# Patient Record
Sex: FEMALE | Race: WHITE | NOT HISPANIC OR LATINO
[De-identification: names, ages, dates, MRNs, and addresses within clinical notes are randomized per-mention and may not be internally consistent; named-entity substitution may affect disease eponyms.]

---

## 2018-08-28 ENCOUNTER — APPOINTMENT (OUTPATIENT)
Dept: CARDIOLOGY | Facility: CLINIC | Age: 63
End: 2018-08-28
Payer: COMMERCIAL

## 2018-08-28 VITALS
HEART RATE: 104 BPM | SYSTOLIC BLOOD PRESSURE: 144 MMHG | TEMPERATURE: 98.1 F | BODY MASS INDEX: 17.5 KG/M2 | DIASTOLIC BLOOD PRESSURE: 88 MMHG | OXYGEN SATURATION: 96 % | WEIGHT: 100 LBS | HEIGHT: 63.5 IN

## 2018-08-28 DIAGNOSIS — Z82.49 FAMILY HISTORY OF ISCHEMIC HEART DISEASE AND OTHER DISEASES OF THE CIRCULATORY SYSTEM: ICD-10-CM

## 2018-08-28 PROCEDURE — 93000 ELECTROCARDIOGRAM COMPLETE: CPT

## 2018-08-28 PROCEDURE — 99244 OFF/OP CNSLTJ NEW/EST MOD 40: CPT

## 2018-09-01 ENCOUNTER — TRANSCRIPTION ENCOUNTER (OUTPATIENT)
Age: 63
End: 2018-09-01

## 2018-10-01 ENCOUNTER — APPOINTMENT (OUTPATIENT)
Dept: VASCULAR SURGERY | Facility: CLINIC | Age: 63
End: 2018-10-01
Payer: COMMERCIAL

## 2018-10-01 VITALS
HEIGHT: 63 IN | OXYGEN SATURATION: 100 % | DIASTOLIC BLOOD PRESSURE: 101 MMHG | TEMPERATURE: 98.5 F | SYSTOLIC BLOOD PRESSURE: 158 MMHG | HEART RATE: 92 BPM

## 2018-10-01 VITALS
BODY MASS INDEX: 17.18 KG/M2 | HEART RATE: 98 BPM | DIASTOLIC BLOOD PRESSURE: 100 MMHG | SYSTOLIC BLOOD PRESSURE: 157 MMHG | WEIGHT: 97 LBS

## 2018-10-01 PROCEDURE — 93880 EXTRACRANIAL BILAT STUDY: CPT

## 2018-10-01 PROCEDURE — 99203 OFFICE O/P NEW LOW 30 MIN: CPT

## 2018-12-27 ENCOUNTER — OUTPATIENT (OUTPATIENT)
Dept: OUTPATIENT SERVICES | Facility: HOSPITAL | Age: 63
LOS: 1 days | End: 2018-12-27
Payer: COMMERCIAL

## 2018-12-27 VITALS
OXYGEN SATURATION: 99 % | WEIGHT: 95.02 LBS | RESPIRATION RATE: 18 BRPM | HEART RATE: 99 BPM | SYSTOLIC BLOOD PRESSURE: 128 MMHG | TEMPERATURE: 98 F | HEIGHT: 63 IN | DIASTOLIC BLOOD PRESSURE: 94 MMHG

## 2018-12-27 DIAGNOSIS — Z90.12 ACQUIRED ABSENCE OF LEFT BREAST AND NIPPLE: Chronic | ICD-10-CM

## 2018-12-27 DIAGNOSIS — Z90.89 ACQUIRED ABSENCE OF OTHER ORGANS: Chronic | ICD-10-CM

## 2018-12-27 DIAGNOSIS — Z90.710 ACQUIRED ABSENCE OF BOTH CERVIX AND UTERUS: Chronic | ICD-10-CM

## 2018-12-27 DIAGNOSIS — Z98.890 OTHER SPECIFIED POSTPROCEDURAL STATES: Chronic | ICD-10-CM

## 2018-12-27 DIAGNOSIS — Z98.41 CATARACT EXTRACTION STATUS, RIGHT EYE: Chronic | ICD-10-CM

## 2018-12-27 DIAGNOSIS — Z01.818 ENCOUNTER FOR OTHER PREPROCEDURAL EXAMINATION: ICD-10-CM

## 2018-12-27 DIAGNOSIS — K86.89 OTHER SPECIFIED DISEASES OF PANCREAS: ICD-10-CM

## 2018-12-27 DIAGNOSIS — Z90.13 ACQUIRED ABSENCE OF BILATERAL BREASTS AND NIPPLES: Chronic | ICD-10-CM

## 2018-12-27 DIAGNOSIS — Z98.42 CATARACT EXTRACTION STATUS, LEFT EYE: Chronic | ICD-10-CM

## 2018-12-27 DIAGNOSIS — Z96.7 PRESENCE OF OTHER BONE AND TENDON IMPLANTS: Chronic | ICD-10-CM

## 2018-12-27 DIAGNOSIS — Z98.1 ARTHRODESIS STATUS: Chronic | ICD-10-CM

## 2018-12-27 PROCEDURE — G0463: CPT

## 2018-12-27 NOTE — H&P PST ADULT - PSH
H/O bilateral breast reduction surgery  2002  History of spinal fusion  cervical C5-C7 with metal hardware-2012 ( discectomy)  S/P carpal tunnel release  left-2015  S/P cataract surgery, left    S/P cataract surgery, right  with artificial lenses- November 2016  S/P decompression of ulnar nerve at elbow  left -2015 ( done with carpal tunnel release)  S/P hysterectomy  2008  S/P lumpectomy, left breast  2010  S/P ORIF (open reduction internal fixation) fracture  left foot  S/P tonsillectomy

## 2018-12-27 NOTE — H&P PST ADULT - HISTORY OF PRESENT ILLNESS
62 yo female with chronic pain and decreased appetite, wt loss of 10 lbs.  MRI of abdomen shows large gall stones blocking bile duct.

## 2018-12-27 NOTE — H&P PST ADULT - PMH
Anxiety    Breast mass, left    Carpal tunnel syndrome of left wrist    Cataract  bilateral eyes  Cervical disc disorder at C6-C7 level with radiculopathy  with ulnar nerve compression  Chronic pain    Fibrocystic breast disease    Foot fracture, left  2016, s/p pins/screws  HPV in female    Osteoporosis    Subclavian arterial stenosis  left side 2018  Uterine fibroid

## 2019-01-03 ENCOUNTER — RESULT REVIEW (OUTPATIENT)
Age: 64
End: 2019-01-03

## 2019-01-03 ENCOUNTER — OUTPATIENT (OUTPATIENT)
Dept: OUTPATIENT SERVICES | Facility: HOSPITAL | Age: 64
LOS: 1 days | End: 2019-01-03
Payer: COMMERCIAL

## 2019-01-03 DIAGNOSIS — Z98.890 OTHER SPECIFIED POSTPROCEDURAL STATES: Chronic | ICD-10-CM

## 2019-01-03 DIAGNOSIS — Z90.13 ACQUIRED ABSENCE OF BILATERAL BREASTS AND NIPPLES: Chronic | ICD-10-CM

## 2019-01-03 DIAGNOSIS — Z98.41 CATARACT EXTRACTION STATUS, RIGHT EYE: Chronic | ICD-10-CM

## 2019-01-03 DIAGNOSIS — Z90.89 ACQUIRED ABSENCE OF OTHER ORGANS: Chronic | ICD-10-CM

## 2019-01-03 DIAGNOSIS — Z01.818 ENCOUNTER FOR OTHER PREPROCEDURAL EXAMINATION: ICD-10-CM

## 2019-01-03 DIAGNOSIS — Z90.12 ACQUIRED ABSENCE OF LEFT BREAST AND NIPPLE: Chronic | ICD-10-CM

## 2019-01-03 DIAGNOSIS — Z98.1 ARTHRODESIS STATUS: Chronic | ICD-10-CM

## 2019-01-03 DIAGNOSIS — K86.89 OTHER SPECIFIED DISEASES OF PANCREAS: ICD-10-CM

## 2019-01-03 DIAGNOSIS — Z98.42 CATARACT EXTRACTION STATUS, LEFT EYE: Chronic | ICD-10-CM

## 2019-01-03 DIAGNOSIS — Z96.7 PRESENCE OF OTHER BONE AND TENDON IMPLANTS: Chronic | ICD-10-CM

## 2019-01-03 DIAGNOSIS — Z90.710 ACQUIRED ABSENCE OF BOTH CERVIX AND UTERUS: Chronic | ICD-10-CM

## 2019-01-03 PROCEDURE — 43239 EGD BIOPSY SINGLE/MULTIPLE: CPT

## 2019-01-03 PROCEDURE — 88305 TISSUE EXAM BY PATHOLOGIST: CPT | Mod: 26

## 2019-01-03 PROCEDURE — 88305 TISSUE EXAM BY PATHOLOGIST: CPT

## 2019-01-03 PROCEDURE — 88312 SPECIAL STAINS GROUP 1: CPT

## 2019-01-03 PROCEDURE — 88312 SPECIAL STAINS GROUP 1: CPT | Mod: 26

## 2019-01-04 LAB — SURGICAL PATHOLOGY STUDY: SIGNIFICANT CHANGE UP

## 2019-04-07 ENCOUNTER — TRANSCRIPTION ENCOUNTER (OUTPATIENT)
Age: 64
End: 2019-04-07

## 2019-04-08 ENCOUNTER — APPOINTMENT (OUTPATIENT)
Dept: VASCULAR SURGERY | Facility: CLINIC | Age: 64
End: 2019-04-08

## 2020-01-31 PROBLEM — S92.902A UNSPECIFIED FRACTURE OF LEFT FOOT, INITIAL ENCOUNTER FOR CLOSED FRACTURE: Chronic | Status: ACTIVE | Noted: 2018-12-27

## 2020-01-31 PROBLEM — G89.29 OTHER CHRONIC PAIN: Chronic | Status: ACTIVE | Noted: 2018-12-27

## 2020-01-31 PROBLEM — I77.1 STRICTURE OF ARTERY: Chronic | Status: ACTIVE | Noted: 2018-12-27

## 2020-01-31 PROBLEM — M81.0 AGE-RELATED OSTEOPOROSIS WITHOUT CURRENT PATHOLOGICAL FRACTURE: Chronic | Status: ACTIVE | Noted: 2018-12-27

## 2020-03-10 ENCOUNTER — APPOINTMENT (OUTPATIENT)
Dept: ENDOCRINOLOGY | Facility: CLINIC | Age: 65
End: 2020-03-10
Payer: COMMERCIAL

## 2020-03-10 VITALS
HEART RATE: 108 BPM | HEIGHT: 63 IN | DIASTOLIC BLOOD PRESSURE: 101 MMHG | WEIGHT: 90 LBS | BODY MASS INDEX: 15.95 KG/M2 | SYSTOLIC BLOOD PRESSURE: 159 MMHG

## 2020-03-10 DIAGNOSIS — M81.0 AGE-RELATED OSTEOPOROSIS W/OUT CURRENT PATHOLOGICAL FRACTURE: ICD-10-CM

## 2020-03-10 PROCEDURE — 99205 OFFICE O/P NEW HI 60 MIN: CPT

## 2020-03-11 LAB
25(OH)D3 SERPL-MCNC: 34.9 NG/ML
ALBUMIN SERPL ELPH-MCNC: 4.9 G/DL
ALP BLD-CCNC: 63 U/L
ALT SERPL-CCNC: 17 U/L
ANION GAP SERPL CALC-SCNC: 17 MMOL/L
AST SERPL-CCNC: 36 U/L
BILIRUB SERPL-MCNC: 0.4 MG/DL
BUN SERPL-MCNC: 10 MG/DL
CA-I SERPL-SCNC: 1.24 MMOL/L
CALCIUM SERPL-MCNC: 9.8 MG/DL
CALCIUM SERPL-MCNC: 9.8 MG/DL
CHLORIDE SERPL-SCNC: 103 MMOL/L
CO2 SERPL-SCNC: 24 MMOL/L
CREAT SERPL-MCNC: 0.56 MG/DL
GLUCOSE SERPL-MCNC: 86 MG/DL
MAGNESIUM SERPL-MCNC: 2.2 MG/DL
PARATHYROID HORMONE INTACT: 39 PG/ML
PHOSPHATE SERPL-MCNC: 2.7 MG/DL
POTASSIUM SERPL-SCNC: 4.3 MMOL/L
PROT SERPL-MCNC: 6.9 G/DL
SODIUM SERPL-SCNC: 144 MMOL/L
T3 SERPL-MCNC: 105 NG/DL
T4 FREE SERPL-MCNC: 1.1 NG/DL
TSH SERPL-ACNC: 0.58 UIU/ML

## 2020-03-12 LAB — TSI ACT/NOR SER: <0.1 IU/L

## 2020-03-13 ENCOUNTER — TRANSCRIPTION ENCOUNTER (OUTPATIENT)
Age: 65
End: 2020-03-13

## 2020-03-15 ENCOUNTER — TRANSCRIPTION ENCOUNTER (OUTPATIENT)
Age: 65
End: 2020-03-15

## 2020-03-18 ENCOUNTER — TRANSCRIPTION ENCOUNTER (OUTPATIENT)
Age: 65
End: 2020-03-18

## 2020-03-18 NOTE — PHYSICAL EXAM
[Normal Hearing] : hearing was normal [No Neck Mass] : no neck mass was observed [Supple] : the neck was supple [Thyroid Not Enlarged] : the thyroid was not enlarged [Normal Rate] : heart rate was normal  [Normal S1, S2] : normal S1 and S2 [Regular Rhythm] : with a regular rhythm [No Edema] : there was no peripheral edema [Normal Bowel Sounds] : normal bowel sounds [Not Tender] : non-tender [Soft] : abdomen soft [Not Distended] : not distended [No Stigmata of Cushings Syndrome] : no stigmata of cushings syndrome [Normal Gait] : normal gait [Normal Strength/Tone] : muscle strength and tone were normal [Normal Reflexes] : deep tendon reflexes were 2+ and symmetric [No Tremors] : no tremors [Normal Affect] : the affect was normal [Normal Mood] : the mood was normal [Acne] : no acne [Abdominal Striae] : no abdominal striae [de-identified] : thin  woman in NAD [de-identified] : mild proptosis. left eye looks down and laterally, causing disconjugate gaze [de-identified] : tranverse scar from prior disc fusion

## 2020-03-18 NOTE — ADDENDUM
[FreeTextEntry1] : 3/10/20: 25OH vit D 34.9, Phos 2.7, Mg 2.2, Cr 0.56, , LFTs wnl, Ca 9.8, PTH 39, TSH 0.58, fT4 1.1, TT3 105 all wnl. TSI normal. \par -called 3/12/20 to discuss. No answer. Will discuss at upcoming visit. \par -3/13/20: pt called me back. I reviewed the above with her. She says she's looked up both Forteo and Tymlos and doesn't want to take either. I urged her to keep the upcoming appt so we can discuss more about her osteoporosis.\par -DXA from 3/12/20 showed that the worst T score was -3.1 at bilateral femoral necks. We asked for more detailed records. 3/18/20: again urged pt to consider Forteo or Tymlos or at least an oral bisphosphonate since she doesn't want to take Reclast.

## 2020-03-18 NOTE — DATA REVIEWED
[FreeTextEntry1] : 1/22/19: Cr 0.55, , Ca 9.6, Transglutaminase IgA Ab neg\par \par 10/17/19: LDL 95, HDL 87, Tchol 220, Tg 264, TSH 0.35L, fT4 1.0, TT4 6.4 wnl, T3 uptake 37 H (ref 22-35%), TT3 111 wnl, MCV 101H, A1c 5.1%, RF 54H, 25 OH Vit D 41

## 2020-03-18 NOTE — ASSESSMENT
[FreeTextEntry1] : 64yoF h/o low TSH, osteoporosis.\par \par #Prior low TSH: previously had mild subclinical hyperthyroidism. May have mild Graves' eye disease on exam. In addition, her weight loss and fatigue may be due to Grave's disease.\par -recheck TFTs and also check TSI.\par -should stop smoking. \par \par #Osteoporosis s/p IV Reclast 2010, 2011. Had multiple prior fractures.\par -check calciotropic workup\par -obtain updated DXA\par -if severe osteoporosis we will proceed with Tymlos or Forteo if she's willing. She says she'll look up both these medications and think about them.\par \par RTC 2-3 weeks.

## 2020-03-18 NOTE — HISTORY OF PRESENT ILLNESS
[FreeTextEntry1] : 64yoF h/o TFT abnormalities, osteoporosis here for initial evaluation and treatment\par \par Thyroid history: \par -had slightly low TSH and high T3 uptake (other TFTs wnl) in Oct 2019\par -family history of thyroid disease: sister with Grave's disease \par -No recent iodinated contrast injection or amiodarone use. No history of irradiation. \par -she's had several weeks of weight loss, fatigue. Started having allergies and dry eyes a few days ago. Occasional anxiety with panic attacks.\par  \par #Osteoporosis\par -dx  by DXA\par -Fractures: 2006: broke left ankle when she fell on the sidewalk after running. 2016 - shattered left metatarsal when she hit the staircase when wearing heavy flip flops. Then discovered to have left ankle stress fracture in  as a complication of repair of left metartarsal. 2017: developed a stress fracture in the pelvis - she says this "completely resolved" after physical therapy. No spine fx on MRI 20\par -Risk factors: Parents did not break their hips. Has been smoking since age 42, 1/2 pack per day. Surgical Menopause in  for abnormal uterine bleeding (age 53)\par -Kidney stones: none on MRI 19\par -Dairy intake and supplements: D3 1000 IU daily, Ca citrate 1500mg daily. Eats dairy daily. \par -Treatment hx: IV Reclast , , BMD decreased per her report and thus she stopped.\par -last DXA scan was  - we don't have the results  \par \par -Exercise: none. \par -Dental health: sees dentist regularly, no dental issues, no pending surgeries, extractions\par \par PMH: denies h/o cancer\par #Smokin-15 cigarettes daily\par \par #anxiety: takes clonazepam\par  \par ROS: endorses weight loss, fatigue, palpitations, constipation, diarrhea, tingling in feet, hair loss, depression, anxiety. Review of constitutional, eyes, ENT, cardiovascular, respiratory, gastrointestinal, genitourinary, musculoskeletal, integumentary, neurological, psychiatric, endocrine and heme/lymph systems is otherwise negative.

## 2020-04-07 ENCOUNTER — APPOINTMENT (OUTPATIENT)
Dept: ENDOCRINOLOGY | Facility: CLINIC | Age: 65
End: 2020-04-07

## 2020-12-24 ENCOUNTER — APPOINTMENT (OUTPATIENT)
Dept: HEART AND VASCULAR | Facility: CLINIC | Age: 65
End: 2020-12-24

## 2021-01-12 ENCOUNTER — NON-APPOINTMENT (OUTPATIENT)
Age: 66
End: 2021-01-12

## 2021-01-12 ENCOUNTER — APPOINTMENT (OUTPATIENT)
Dept: HEART AND VASCULAR | Facility: CLINIC | Age: 66
End: 2021-01-12
Payer: COMMERCIAL

## 2021-01-12 VITALS
BODY MASS INDEX: 16.3 KG/M2 | WEIGHT: 92 LBS | HEIGHT: 63 IN | HEART RATE: 97 BPM | TEMPERATURE: 98.1 F | DIASTOLIC BLOOD PRESSURE: 88 MMHG | SYSTOLIC BLOOD PRESSURE: 162 MMHG | OXYGEN SATURATION: 96 %

## 2021-01-12 DIAGNOSIS — Z86.69 PERSONAL HISTORY OF OTHER DISEASES OF THE NERVOUS SYSTEM AND SENSE ORGANS: ICD-10-CM

## 2021-01-12 DIAGNOSIS — Z87.39 PERSONAL HISTORY OF OTHER DISEASES OF THE MUSCULOSKELETAL SYSTEM AND CONNECTIVE TISSUE: ICD-10-CM

## 2021-01-12 PROCEDURE — 99072 ADDL SUPL MATRL&STAF TM PHE: CPT

## 2021-01-12 PROCEDURE — 36415 COLL VENOUS BLD VENIPUNCTURE: CPT

## 2021-01-12 PROCEDURE — 99243 OFF/OP CNSLTJ NEW/EST LOW 30: CPT

## 2021-01-12 PROCEDURE — 93000 ELECTROCARDIOGRAM COMPLETE: CPT

## 2021-01-12 NOTE — HISTORY OF PRESENT ILLNESS
[FreeTextEntry1] : 65 F Anxiety, microvascular disease inte brain, chronic pain,  HTN, multiple medication intolerances, depression and suicidal thoughts in the past referred by Dr. Stewart for evaluation of possible CV disease Dr. Rhodes referred her for possible TIA and wanted a carotid US she has multiple complaints of left sided numbness and tingling does not walk at all due to foot pain from prior surgery \par \par ecg nsr

## 2021-01-12 NOTE — PHYSICAL EXAM
[General Appearance - Well Developed] : well developed [Normal Appearance] : normal appearance [Well Groomed] : well groomed [General Appearance - Well Nourished] : well nourished [No Deformities] : no deformities [General Appearance - In No Acute Distress] : no acute distress [Normal Conjunctiva] : the conjunctiva exhibited no abnormalities [Eyelids - No Xanthelasma] : the eyelids demonstrated no xanthelasmas [Normal Oral Mucosa] : normal oral mucosa [No Oral Pallor] : no oral pallor [No Oral Cyanosis] : no oral cyanosis [Normal Jugular Venous A Waves Present] : normal jugular venous A waves present [Normal Jugular Venous V Waves Present] : normal jugular venous V waves present [No Jugular Venous Chin A Waves] : no jugular venous chin A waves [Heart Rate And Rhythm] : heart rate and rhythm were normal [Heart Sounds] : normal S1 and S2 [Murmurs] : no murmurs present [Respiration, Rhythm And Depth] : normal respiratory rhythm and effort [Exaggerated Use Of Accessory Muscles For Inspiration] : no accessory muscle use [Auscultation Breath Sounds / Voice Sounds] : lungs were clear to auscultation bilaterally [Abdomen Soft] : soft [Abdomen Tenderness] : non-tender [Abdomen Mass (___ Cm)] : no abdominal mass palpated [Abnormal Walk] : normal gait [Gait - Sufficient For Exercise Testing] : the gait was sufficient for exercise testing [Nail Clubbing] : no clubbing of the fingernails [Cyanosis, Localized] : no localized cyanosis [Petechial Hemorrhages (___cm)] : no petechial hemorrhages [Skin Color & Pigmentation] : normal skin color and pigmentation [] : no rash [No Venous Stasis] : no venous stasis [Skin Lesions] : no skin lesions [No Skin Ulcers] : no skin ulcer [No Xanthoma] : no  xanthoma was observed [Oriented To Time, Place, And Person] : oriented to person, place, and time [Affect] : the affect was normal [Mood] : the mood was normal [No Anxiety] : not feeling anxious

## 2021-01-12 NOTE — ASSESSMENT
[FreeTextEntry1] : Her focus is really her total body pain and fatigue\par what i can offer is bp control she has signs of microvascular disease \par she is not interested in smoking cessation\par plan for lipid panel risk profile, echo, carotid and calcium score \par will address ? sleep apnea as cause for fatigue next visit\par she complained of palpitations we will address fu visit

## 2021-01-13 LAB
APO B SERPL-MCNC: 89 MG/DL
CHOLEST SERPL-MCNC: 224 MG/DL
CREAT SPEC-SCNC: 123 MG/DL
CRP SERPL HS-MCNC: 0.78 MG/L
HDLC SERPL-MCNC: 104 MG/DL
LDLC SERPL CALC-MCNC: 86 MG/DL
MICROALBUMIN 24H UR DL<=1MG/L-MCNC: 1.8 MG/DL
MICROALBUMIN/CREAT 24H UR-RTO: 15 MG/G
NONHDLC SERPL-MCNC: 121 MG/DL
TRIGL SERPL-MCNC: 173 MG/DL

## 2021-02-03 ENCOUNTER — APPOINTMENT (OUTPATIENT)
Dept: HEART AND VASCULAR | Facility: CLINIC | Age: 66
End: 2021-02-03

## 2021-02-22 ENCOUNTER — TRANSCRIPTION ENCOUNTER (OUTPATIENT)
Age: 66
End: 2021-02-22

## 2021-02-24 ENCOUNTER — APPOINTMENT (OUTPATIENT)
Dept: HEART AND VASCULAR | Facility: CLINIC | Age: 66
End: 2021-02-24
Payer: COMMERCIAL

## 2021-02-24 PROCEDURE — 99442: CPT

## 2021-04-20 ENCOUNTER — APPOINTMENT (OUTPATIENT)
Dept: HEART AND VASCULAR | Facility: CLINIC | Age: 66
End: 2021-04-20
Payer: COMMERCIAL

## 2021-04-20 DIAGNOSIS — I10 ESSENTIAL (PRIMARY) HYPERTENSION: ICD-10-CM

## 2021-04-20 PROCEDURE — 99442: CPT

## 2021-10-07 ENCOUNTER — EMERGENCY (EMERGENCY)
Facility: HOSPITAL | Age: 66
LOS: 1 days | Discharge: HOME | End: 2021-10-07
Attending: EMERGENCY MEDICINE | Admitting: STUDENT IN AN ORGANIZED HEALTH CARE EDUCATION/TRAINING PROGRAM
Payer: COMMERCIAL

## 2021-10-07 VITALS
DIASTOLIC BLOOD PRESSURE: 89 MMHG | HEART RATE: 94 BPM | HEIGHT: 63 IN | TEMPERATURE: 98 F | OXYGEN SATURATION: 100 % | RESPIRATION RATE: 18 BRPM | SYSTOLIC BLOOD PRESSURE: 169 MMHG

## 2021-10-07 DIAGNOSIS — Z98.41 CATARACT EXTRACTION STATUS, RIGHT EYE: Chronic | ICD-10-CM

## 2021-10-07 DIAGNOSIS — Z96.7 PRESENCE OF OTHER BONE AND TENDON IMPLANTS: Chronic | ICD-10-CM

## 2021-10-07 DIAGNOSIS — Z90.89 ACQUIRED ABSENCE OF OTHER ORGANS: Chronic | ICD-10-CM

## 2021-10-07 DIAGNOSIS — Z90.710 ACQUIRED ABSENCE OF BOTH CERVIX AND UTERUS: Chronic | ICD-10-CM

## 2021-10-07 DIAGNOSIS — Z90.12 ACQUIRED ABSENCE OF LEFT BREAST AND NIPPLE: Chronic | ICD-10-CM

## 2021-10-07 DIAGNOSIS — Z98.1 ARTHRODESIS STATUS: Chronic | ICD-10-CM

## 2021-10-07 DIAGNOSIS — Z98.42 CATARACT EXTRACTION STATUS, LEFT EYE: Chronic | ICD-10-CM

## 2021-10-07 DIAGNOSIS — Z90.13 ACQUIRED ABSENCE OF BILATERAL BREASTS AND NIPPLES: Chronic | ICD-10-CM

## 2021-10-07 DIAGNOSIS — Z98.890 OTHER SPECIFIED POSTPROCEDURAL STATES: Chronic | ICD-10-CM

## 2021-10-07 LAB
ALBUMIN SERPL ELPH-MCNC: 4.7 G/DL — SIGNIFICANT CHANGE UP (ref 3.3–5)
ALP SERPL-CCNC: 66 U/L — SIGNIFICANT CHANGE UP (ref 40–120)
ALT FLD-CCNC: 13 U/L — SIGNIFICANT CHANGE UP (ref 4–33)
ANION GAP SERPL CALC-SCNC: 12 MMOL/L — SIGNIFICANT CHANGE UP (ref 7–14)
APPEARANCE UR: CLEAR — SIGNIFICANT CHANGE UP
AST SERPL-CCNC: 26 U/L — SIGNIFICANT CHANGE UP (ref 4–32)
BASOPHILS # BLD AUTO: 0.04 K/UL — SIGNIFICANT CHANGE UP (ref 0–0.2)
BASOPHILS NFR BLD AUTO: 0.4 % — SIGNIFICANT CHANGE UP (ref 0–2)
BILIRUB SERPL-MCNC: 0.4 MG/DL — SIGNIFICANT CHANGE UP (ref 0.2–1.2)
BILIRUB UR-MCNC: NEGATIVE — SIGNIFICANT CHANGE UP
BUN SERPL-MCNC: 9 MG/DL — SIGNIFICANT CHANGE UP (ref 7–23)
CALCIUM SERPL-MCNC: 9.3 MG/DL — SIGNIFICANT CHANGE UP (ref 8.4–10.5)
CHLORIDE SERPL-SCNC: 102 MMOL/L — SIGNIFICANT CHANGE UP (ref 98–107)
CO2 SERPL-SCNC: 26 MMOL/L — SIGNIFICANT CHANGE UP (ref 22–31)
COLOR SPEC: COLORLESS — SIGNIFICANT CHANGE UP
CREAT SERPL-MCNC: 0.51 MG/DL — SIGNIFICANT CHANGE UP (ref 0.5–1.3)
DIFF PNL FLD: NEGATIVE — SIGNIFICANT CHANGE UP
EOSINOPHIL # BLD AUTO: 0.06 K/UL — SIGNIFICANT CHANGE UP (ref 0–0.5)
EOSINOPHIL NFR BLD AUTO: 0.6 % — SIGNIFICANT CHANGE UP (ref 0–6)
GLUCOSE SERPL-MCNC: 91 MG/DL — SIGNIFICANT CHANGE UP (ref 70–99)
GLUCOSE UR QL: NEGATIVE — SIGNIFICANT CHANGE UP
HCT VFR BLD CALC: 40.8 % — SIGNIFICANT CHANGE UP (ref 34.5–45)
HGB BLD-MCNC: 13.7 G/DL — SIGNIFICANT CHANGE UP (ref 11.5–15.5)
IANC: 5.79 K/UL — SIGNIFICANT CHANGE UP (ref 1.5–8.5)
IMM GRANULOCYTES NFR BLD AUTO: 0.2 % — SIGNIFICANT CHANGE UP (ref 0–1.5)
KETONES UR-MCNC: NEGATIVE — SIGNIFICANT CHANGE UP
LEUKOCYTE ESTERASE UR-ACNC: NEGATIVE — SIGNIFICANT CHANGE UP
LIDOCAIN IGE QN: 22 U/L — SIGNIFICANT CHANGE UP (ref 7–60)
LYMPHOCYTES # BLD AUTO: 2.52 K/UL — SIGNIFICANT CHANGE UP (ref 1–3.3)
LYMPHOCYTES # BLD AUTO: 26.3 % — SIGNIFICANT CHANGE UP (ref 13–44)
MCHC RBC-ENTMCNC: 33.6 GM/DL — SIGNIFICANT CHANGE UP (ref 32–36)
MCHC RBC-ENTMCNC: 33.7 PG — SIGNIFICANT CHANGE UP (ref 27–34)
MCV RBC AUTO: 100.5 FL — HIGH (ref 80–100)
MONOCYTES # BLD AUTO: 1.16 K/UL — HIGH (ref 0–0.9)
MONOCYTES NFR BLD AUTO: 12.1 % — SIGNIFICANT CHANGE UP (ref 2–14)
NEUTROPHILS # BLD AUTO: 5.79 K/UL — SIGNIFICANT CHANGE UP (ref 1.8–7.4)
NEUTROPHILS NFR BLD AUTO: 60.4 % — SIGNIFICANT CHANGE UP (ref 43–77)
NITRITE UR-MCNC: NEGATIVE — SIGNIFICANT CHANGE UP
NRBC # BLD: 0 /100 WBCS — SIGNIFICANT CHANGE UP
NRBC # FLD: 0 K/UL — SIGNIFICANT CHANGE UP
PH UR: 6.5 — SIGNIFICANT CHANGE UP (ref 5–8)
PLATELET # BLD AUTO: 246 K/UL — SIGNIFICANT CHANGE UP (ref 150–400)
POTASSIUM SERPL-MCNC: 4.7 MMOL/L — SIGNIFICANT CHANGE UP (ref 3.5–5.3)
POTASSIUM SERPL-SCNC: 4.7 MMOL/L — SIGNIFICANT CHANGE UP (ref 3.5–5.3)
PROT SERPL-MCNC: 7.1 G/DL — SIGNIFICANT CHANGE UP (ref 6–8.3)
PROT UR-MCNC: NEGATIVE — SIGNIFICANT CHANGE UP
RBC # BLD: 4.06 M/UL — SIGNIFICANT CHANGE UP (ref 3.8–5.2)
RBC # FLD: 11.9 % — SIGNIFICANT CHANGE UP (ref 10.3–14.5)
SODIUM SERPL-SCNC: 140 MMOL/L — SIGNIFICANT CHANGE UP (ref 135–145)
SP GR SPEC: 1.01 — SIGNIFICANT CHANGE UP (ref 1–1.05)
UROBILINOGEN FLD QL: SIGNIFICANT CHANGE UP
WBC # BLD: 9.59 K/UL — SIGNIFICANT CHANGE UP (ref 3.8–10.5)
WBC # FLD AUTO: 9.59 K/UL — SIGNIFICANT CHANGE UP (ref 3.8–10.5)

## 2021-10-07 PROCEDURE — 99285 EMERGENCY DEPT VISIT HI MDM: CPT

## 2021-10-07 PROCEDURE — 74177 CT ABD & PELVIS W/CONTRAST: CPT | Mod: 26

## 2021-10-07 RX ORDER — ONDANSETRON 8 MG/1
4 TABLET, FILM COATED ORAL ONCE
Refills: 0 | Status: DISCONTINUED | OUTPATIENT
Start: 2021-10-07 | End: 2021-10-07

## 2021-10-07 RX ORDER — KETOROLAC TROMETHAMINE 30 MG/ML
15 SYRINGE (ML) INJECTION ONCE
Refills: 0 | Status: DISCONTINUED | OUTPATIENT
Start: 2021-10-07 | End: 2021-10-07

## 2021-10-07 RX ORDER — SODIUM CHLORIDE 9 MG/ML
1000 INJECTION INTRAMUSCULAR; INTRAVENOUS; SUBCUTANEOUS ONCE
Refills: 0 | Status: COMPLETED | OUTPATIENT
Start: 2021-10-07 | End: 2021-10-07

## 2021-10-07 RX ORDER — FAMOTIDINE 10 MG/ML
20 INJECTION INTRAVENOUS ONCE
Refills: 0 | Status: DISCONTINUED | OUTPATIENT
Start: 2021-10-07 | End: 2021-10-07

## 2021-10-07 RX ADMIN — Medication 15 MILLIGRAM(S): at 17:55

## 2021-10-07 RX ADMIN — SODIUM CHLORIDE 1000 MILLILITER(S): 9 INJECTION INTRAMUSCULAR; INTRAVENOUS; SUBCUTANEOUS at 18:58

## 2021-10-07 RX ADMIN — SODIUM CHLORIDE 1000 MILLILITER(S): 9 INJECTION INTRAMUSCULAR; INTRAVENOUS; SUBCUTANEOUS at 17:40

## 2021-10-07 RX ADMIN — Medication 15 MILLIGRAM(S): at 18:58

## 2021-10-07 NOTE — ED PROVIDER NOTE - PATIENT PORTAL LINK FT
You can access the FollowMyHealth Patient Portal offered by [facility] by registering at the following website: [email or web address]. By joining [facility]’s FollowMyHealth portal, you will also be able to view your health information using other applications (apps) compatible with our system.

## 2021-10-07 NOTE — ED ADULT NURSE NOTE - OBJECTIVE STATEMENT
received pt in bed A and OX 3 in NAD resting comfortably, pt c/o lower back pain, reports has multiple medical issued and undergoes extensive medical examinations with negative work ups, reports feeling frustrated that her medical problems are undiagnosed, abd soft non distended non tender. pt is tearful, emotional support provided for the patient. Pt refused initial plan for medications, following orders noted and completed.

## 2021-10-07 NOTE — ED PROVIDER NOTE - OBJECTIVE STATEMENT
66 year old female that has a history of chronic lower back pain came to the ED because of right lower back pain, that radiates to the abdomen for the last 3 days. The pain comes in waves and is severe. No fever, no chills, no vomiting, no diarrhea, no urinary complaints, no headache.

## 2021-10-07 NOTE — ED PROVIDER NOTE - NSICDXFAMILYHX_GEN_ALL_CORE_FT
FAMILY HISTORY:  Father  Still living? No  Acute myocardial infarction, Age at diagnosis: Age Unknown    Mother  Still living? No  Family history of non-Hodgkin's lymphoma, Age at diagnosis: Age Unknown

## 2021-10-07 NOTE — ED PROVIDER NOTE - NSICDXPASTSURGICALHX_GEN_ALL_CORE_FT
PAST SURGICAL HISTORY:  H/O bilateral breast reduction surgery 2002    History of spinal fusion cervical C5-C7 with metal hardware-2012 ( discectomy)    S/P carpal tunnel release left-2015    S/P cataract surgery, left     S/P cataract surgery, right with artificial lenses- November 2016    S/P decompression of ulnar nerve at elbow left -2015 ( done with carpal tunnel release)    S/P hysterectomy 2008    S/P lumpectomy, left breast 2010    S/P ORIF (open reduction internal fixation) fracture left foot    S/P tonsillectomy

## 2021-10-07 NOTE — ED PROVIDER NOTE - PROGRESS NOTE DETAILS
Phoenix Estrada DO - pt already known to have biliary dilation. Pt reassessed at bedside, feels well, pain controlled. Informed of workup in ED, reviewed lab and/or radiology results (when applicable) with patient/caregiver. Informed pt of plan for discharge with instructions to follow up with PMD. Pt/caregiver expressed understanding of plan and agrees with plan for discharge. Strict return precautions discussed with patient in layman's terms, patient demonstrated understanding of return precuations.

## 2021-10-07 NOTE — ED ADULT NURSE NOTE - CHIEF COMPLAINT QUOTE
Pt c/o of lower back pain radiates to right pelvic x 2 days ago. denies n/v/d , fever, sob, urinary sx. took 1 oxycodone an hour ago  hx of chronic spinal disease

## 2021-10-07 NOTE — ED PROVIDER NOTE - NSFOLLOWUPINSTRUCTIONS_ED_ALL_ED_FT
No signs of emergency medical condition on today's workup.  Presumptive diagnosis made, but further evaluation may be required by your primary care doctor or specialist for a definitive diagnosis.  Therefore, follow up as directed and if symptoms change/worsen or any emergency conditions, please return to the ER.     Follow up with a gastroenterologist as discussed - bring copies of your results

## 2021-10-07 NOTE — ED PROVIDER NOTE - NSICDXPASTMEDICALHX_GEN_ALL_CORE_FT
PAST MEDICAL HISTORY:  Anxiety     Breast mass, left     Carpal tunnel syndrome of left wrist     Cataract bilateral eyes    Cervical disc disorder at C6-C7 level with radiculopathy with ulnar nerve compression    Chronic pain     Fibrocystic breast disease     Foot fracture, left 2016, s/p pins/screws    HPV in female     Osteoporosis     Subclavian arterial stenosis left side 2018    Uterine fibroid

## 2021-10-07 NOTE — ED ADULT TRIAGE NOTE - CHIEF COMPLAINT QUOTE
Pt c/o of lower back pain radiates to right pelvic x 2 days ago. denies n/v/d , fever, sob, urinary sx Pt c/o of lower back pain radiates to right pelvic x 2 days ago. denies n/v/d , fever, sob, urinary sx. took 1 oxycodone an hour ago  hx of chronic spinal disease

## 2021-10-08 LAB
CULTURE RESULTS: SIGNIFICANT CHANGE UP
SPECIMEN SOURCE: SIGNIFICANT CHANGE UP

## 2021-11-19 ENCOUNTER — APPOINTMENT (OUTPATIENT)
Dept: ENDOCRINOLOGY | Facility: CLINIC | Age: 66
End: 2021-11-19
Payer: COMMERCIAL

## 2021-11-19 VITALS
HEIGHT: 63 IN | HEART RATE: 91 BPM | DIASTOLIC BLOOD PRESSURE: 82 MMHG | WEIGHT: 93 LBS | SYSTOLIC BLOOD PRESSURE: 143 MMHG | TEMPERATURE: 98.2 F | BODY MASS INDEX: 16.48 KG/M2 | OXYGEN SATURATION: 95 %

## 2021-11-19 DIAGNOSIS — E04.1 NONTOXIC SINGLE THYROID NODULE: ICD-10-CM

## 2021-11-19 DIAGNOSIS — R79.89 OTHER SPECIFIED ABNORMAL FINDINGS OF BLOOD CHEMISTRY: ICD-10-CM

## 2021-11-19 PROCEDURE — 99212 OFFICE O/P EST SF 10 MIN: CPT

## 2021-11-22 PROBLEM — R79.89 LOW TSH LEVEL: Status: RESOLVED | Noted: 2020-03-10 | Resolved: 2021-11-22

## 2021-11-22 NOTE — HISTORY OF PRESENT ILLNESS
[FreeTextEntry1] : CC: Thyroid nodule\par 66-year-old female with thyroid nodule, osteoporosis, vitamin D insufficiency, history of abnormal TFTs, anxiety, chronic pain syndrome, hypertension, depression, here for evaluation.\par Thyroid ultrasound from September 10, 2021 showed right 1.3 cm thyroid nodule, TR 3. There is no history of radiation therapy to the head or neck. She denies obstructive symptoms.\par \par She was on Reclast in 2009 in 2010 for osteoporosis. She is currently considering Prolia with her rheumatologist.\par There is a history of Graves' disease in her sister and GBS in her brother.\par TSI negative.\par \par

## 2021-11-22 NOTE — ASSESSMENT
[FreeTextEntry1] : 66-year-old female with thyroid nodule, osteoporosis, vitamin D insufficiency, history of abnormal TFTs, anxiety, chronic pain syndrome, hypertension, depression, here for evaluation.\par Thyroid ultrasound from September 10, 2021 showed right 1.3 cm thyroid nodule, TR 3. There is no history of radiation therapy to the head or neck. She denies obstructive symptoms.\par Recent blood work requested from PCP.\par Check thyroid ultrasound in February 2022 (6 months after last thyroid ultrasound). No FNA needed at this time.\par \par

## 2021-11-22 NOTE — REVIEW OF SYSTEMS
[Recent Weight Loss (___ Lbs)] : recent weight loss: [unfilled] lbs [Dry Eyes] : dryness [Back Pain] : back pain [All other systems negative] : All other systems negative [FreeTextEntry2] : Chronic pain [de-identified] : Nerve damage left ankle

## 2021-11-22 NOTE — PHYSICAL EXAM
[Alert] : alert [Healthy Appearance] : healthy appearance [No Acute Distress] : no acute distress [Normal Voice/Communication] : normal voice communication [No Neck Mass] : no neck mass was observed [No LAD] : no lymphadenopathy [Supple] : the neck was supple [Thyroid Not Enlarged] : the thyroid was not enlarged [No Thyroid Nodules] : no palpable thyroid nodules [No Respiratory Distress] : no respiratory distress [Normal Rate] : heart rate was normal [Normal Affect] : the affect was normal [Normal Insight/Judgement] : insight and judgment were intact [Normal Mood] : the mood was normal [de-identified] : Left eye movement abnormality. Mild proptosis. Periorbital edema.

## 2022-05-20 ENCOUNTER — APPOINTMENT (OUTPATIENT)
Dept: ENDOCRINOLOGY | Facility: CLINIC | Age: 67
End: 2022-05-20
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
## 2023-01-18 ENCOUNTER — APPOINTMENT (OUTPATIENT)
Dept: HEART AND VASCULAR | Facility: CLINIC | Age: 68
End: 2023-01-18
Payer: MEDICARE

## 2023-01-18 DIAGNOSIS — G45.9 TRANSIENT CEREBRAL ISCHEMIC ATTACK, UNSPECIFIED: ICD-10-CM

## 2023-01-18 PROCEDURE — 99212 OFFICE O/P EST SF 10 MIN: CPT | Mod: 95

## 2023-01-18 RX ORDER — ROSUVASTATIN CALCIUM 5 MG/1
5 TABLET, FILM COATED ORAL
Qty: 30 | Refills: 5 | Status: ACTIVE | COMMUNITY
Start: 2021-02-24 | End: 1900-01-01

## 2023-01-19 NOTE — ASSESSMENT
[FreeTextEntry1] : she will send me her results\par - increase rosuvastatin to 5 mg twice a week\par - fu in one year

## 2023-01-19 NOTE — HISTORY OF PRESENT ILLNESS
[FreeTextEntry1] : 65 F Anxiety, Microvascular disease in the brain, chronic pain, HTN, Multiple Medication intolerances Chronic Back Pain \par \par \par needs a refill of her statin she has no cardiac complaints had testing done at outside clinic her LDL was good\par she had echo and carotid US

## 2023-01-19 NOTE — REASON FOR VISIT
[FreeTextEntry1] : \par Discussed with patient: You have chosen to receive care through the use of tele-media. Tele-media enables health care providers at different locations to provide safe, effective, and convenient care through the use of technology. Please note this is a billable encounter. As with any health care service, there are risks associated with the use of tele-media, including equipment failure, poor image and/or resolution, and  issues. You understand that I cannot physically examine you and that you may need to come to the clinic to complete the assessment. Patient agreed verbally to understanding the risks and benefits of tele-media as explained. All questions regarding tele-media encounters were answered. \par

## 2023-01-23 ENCOUNTER — TRANSCRIPTION ENCOUNTER (OUTPATIENT)
Age: 68
End: 2023-01-23

## 2023-01-26 ENCOUNTER — APPOINTMENT (OUTPATIENT)
Dept: NEUROLOGY | Facility: CLINIC | Age: 68
End: 2023-01-26

## 2023-03-02 ENCOUNTER — APPOINTMENT (OUTPATIENT)
Dept: NEUROLOGY | Facility: CLINIC | Age: 68
End: 2023-03-02

## 2023-05-08 NOTE — ED PROVIDER NOTE - NS ED MD DISPO DISCHARGE
Patient alert and oriented x 4 with respirations even and unlabored on RA. Patient discharged home per physician's order. Patient and patient's boyfriend Brandee Crowe verbalize understanding of patient's discharge instructions. Patient transported via wheelchair to front hospital entrance with patient's boyfriend present to transport patient via private vehicle. Home

## 2023-12-17 ENCOUNTER — INPATIENT (INPATIENT)
Facility: HOSPITAL | Age: 68
LOS: 4 days | Discharge: NURSING, REHAB OR LONG-TERM CARE | DRG: 536 | End: 2023-12-22
Attending: STUDENT IN AN ORGANIZED HEALTH CARE EDUCATION/TRAINING PROGRAM | Admitting: STUDENT IN AN ORGANIZED HEALTH CARE EDUCATION/TRAINING PROGRAM
Payer: MEDICARE

## 2023-12-17 VITALS
WEIGHT: 87.96 LBS | HEIGHT: 63 IN | DIASTOLIC BLOOD PRESSURE: 83 MMHG | SYSTOLIC BLOOD PRESSURE: 134 MMHG | TEMPERATURE: 98 F | RESPIRATION RATE: 18 BRPM | HEART RATE: 85 BPM | OXYGEN SATURATION: 98 %

## 2023-12-17 DIAGNOSIS — Z98.1 ARTHRODESIS STATUS: Chronic | ICD-10-CM

## 2023-12-17 DIAGNOSIS — Z98.41 CATARACT EXTRACTION STATUS, RIGHT EYE: Chronic | ICD-10-CM

## 2023-12-17 DIAGNOSIS — Z90.13 ACQUIRED ABSENCE OF BILATERAL BREASTS AND NIPPLES: Chronic | ICD-10-CM

## 2023-12-17 DIAGNOSIS — Z98.890 OTHER SPECIFIED POSTPROCEDURAL STATES: Chronic | ICD-10-CM

## 2023-12-17 DIAGNOSIS — Z90.710 ACQUIRED ABSENCE OF BOTH CERVIX AND UTERUS: Chronic | ICD-10-CM

## 2023-12-17 DIAGNOSIS — Z96.7 PRESENCE OF OTHER BONE AND TENDON IMPLANTS: Chronic | ICD-10-CM

## 2023-12-17 DIAGNOSIS — Z98.42 CATARACT EXTRACTION STATUS, LEFT EYE: Chronic | ICD-10-CM

## 2023-12-17 DIAGNOSIS — Z90.12 ACQUIRED ABSENCE OF LEFT BREAST AND NIPPLE: Chronic | ICD-10-CM

## 2023-12-17 DIAGNOSIS — Z90.89 ACQUIRED ABSENCE OF OTHER ORGANS: Chronic | ICD-10-CM

## 2023-12-17 PROCEDURE — 99285 EMERGENCY DEPT VISIT HI MDM: CPT

## 2023-12-18 DIAGNOSIS — R33.9 RETENTION OF URINE, UNSPECIFIED: ICD-10-CM

## 2023-12-18 DIAGNOSIS — I10 ESSENTIAL (PRIMARY) HYPERTENSION: ICD-10-CM

## 2023-12-18 DIAGNOSIS — S32.599A OTHER SPECIFIED FRACTURE OF UNSPECIFIED PUBIS, INITIAL ENCOUNTER FOR CLOSED FRACTURE: ICD-10-CM

## 2023-12-18 DIAGNOSIS — F41.9 ANXIETY DISORDER, UNSPECIFIED: ICD-10-CM

## 2023-12-18 DIAGNOSIS — S32.10XA UNSPECIFIED FRACTURE OF SACRUM, INITIAL ENCOUNTER FOR CLOSED FRACTURE: ICD-10-CM

## 2023-12-18 DIAGNOSIS — Z29.9 ENCOUNTER FOR PROPHYLACTIC MEASURES, UNSPECIFIED: ICD-10-CM

## 2023-12-18 LAB
ALBUMIN SERPL ELPH-MCNC: 3.7 G/DL — SIGNIFICANT CHANGE UP (ref 3.5–5)
ALBUMIN SERPL ELPH-MCNC: 3.7 G/DL — SIGNIFICANT CHANGE UP (ref 3.5–5)
ALP SERPL-CCNC: 58 U/L — SIGNIFICANT CHANGE UP (ref 40–120)
ALP SERPL-CCNC: 58 U/L — SIGNIFICANT CHANGE UP (ref 40–120)
ALT FLD-CCNC: 30 U/L DA — SIGNIFICANT CHANGE UP (ref 10–60)
ALT FLD-CCNC: 30 U/L DA — SIGNIFICANT CHANGE UP (ref 10–60)
ANION GAP SERPL CALC-SCNC: 5 MMOL/L — SIGNIFICANT CHANGE UP (ref 5–17)
ANION GAP SERPL CALC-SCNC: 5 MMOL/L — SIGNIFICANT CHANGE UP (ref 5–17)
APTT BLD: 28 SEC — SIGNIFICANT CHANGE UP (ref 24.5–35.6)
APTT BLD: 28 SEC — SIGNIFICANT CHANGE UP (ref 24.5–35.6)
AST SERPL-CCNC: 25 U/L — SIGNIFICANT CHANGE UP (ref 10–40)
AST SERPL-CCNC: 25 U/L — SIGNIFICANT CHANGE UP (ref 10–40)
BASOPHILS # BLD AUTO: 0.02 K/UL — SIGNIFICANT CHANGE UP (ref 0–0.2)
BASOPHILS # BLD AUTO: 0.02 K/UL — SIGNIFICANT CHANGE UP (ref 0–0.2)
BASOPHILS NFR BLD AUTO: 0.3 % — SIGNIFICANT CHANGE UP (ref 0–2)
BASOPHILS NFR BLD AUTO: 0.3 % — SIGNIFICANT CHANGE UP (ref 0–2)
BILIRUB SERPL-MCNC: 0.5 MG/DL — SIGNIFICANT CHANGE UP (ref 0.2–1.2)
BILIRUB SERPL-MCNC: 0.5 MG/DL — SIGNIFICANT CHANGE UP (ref 0.2–1.2)
BLD GP AB SCN SERPL QL: SIGNIFICANT CHANGE UP
BLD GP AB SCN SERPL QL: SIGNIFICANT CHANGE UP
BUN SERPL-MCNC: 13 MG/DL — SIGNIFICANT CHANGE UP (ref 7–18)
BUN SERPL-MCNC: 13 MG/DL — SIGNIFICANT CHANGE UP (ref 7–18)
CALCIUM SERPL-MCNC: 9.1 MG/DL — SIGNIFICANT CHANGE UP (ref 8.4–10.5)
CALCIUM SERPL-MCNC: 9.1 MG/DL — SIGNIFICANT CHANGE UP (ref 8.4–10.5)
CHLORIDE SERPL-SCNC: 103 MMOL/L — SIGNIFICANT CHANGE UP (ref 96–108)
CHLORIDE SERPL-SCNC: 103 MMOL/L — SIGNIFICANT CHANGE UP (ref 96–108)
CO2 SERPL-SCNC: 30 MMOL/L — SIGNIFICANT CHANGE UP (ref 22–31)
CO2 SERPL-SCNC: 30 MMOL/L — SIGNIFICANT CHANGE UP (ref 22–31)
CREAT SERPL-MCNC: 0.63 MG/DL — SIGNIFICANT CHANGE UP (ref 0.5–1.3)
CREAT SERPL-MCNC: 0.63 MG/DL — SIGNIFICANT CHANGE UP (ref 0.5–1.3)
EGFR: 98 ML/MIN/1.73M2 — SIGNIFICANT CHANGE UP
EGFR: 98 ML/MIN/1.73M2 — SIGNIFICANT CHANGE UP
EOSINOPHIL # BLD AUTO: 0.04 K/UL — SIGNIFICANT CHANGE UP (ref 0–0.5)
EOSINOPHIL # BLD AUTO: 0.04 K/UL — SIGNIFICANT CHANGE UP (ref 0–0.5)
EOSINOPHIL NFR BLD AUTO: 0.5 % — SIGNIFICANT CHANGE UP (ref 0–6)
EOSINOPHIL NFR BLD AUTO: 0.5 % — SIGNIFICANT CHANGE UP (ref 0–6)
GLUCOSE SERPL-MCNC: 98 MG/DL — SIGNIFICANT CHANGE UP (ref 70–99)
GLUCOSE SERPL-MCNC: 98 MG/DL — SIGNIFICANT CHANGE UP (ref 70–99)
HCT VFR BLD CALC: 33 % — LOW (ref 34.5–45)
HCT VFR BLD CALC: 33 % — LOW (ref 34.5–45)
HGB BLD-MCNC: 10.7 G/DL — LOW (ref 11.5–15.5)
HGB BLD-MCNC: 10.7 G/DL — LOW (ref 11.5–15.5)
IMM GRANULOCYTES NFR BLD AUTO: 0.5 % — SIGNIFICANT CHANGE UP (ref 0–0.9)
IMM GRANULOCYTES NFR BLD AUTO: 0.5 % — SIGNIFICANT CHANGE UP (ref 0–0.9)
INR BLD: 1.02 RATIO — SIGNIFICANT CHANGE UP (ref 0.85–1.18)
INR BLD: 1.02 RATIO — SIGNIFICANT CHANGE UP (ref 0.85–1.18)
LYMPHOCYTES # BLD AUTO: 1.39 K/UL — SIGNIFICANT CHANGE UP (ref 1–3.3)
LYMPHOCYTES # BLD AUTO: 1.39 K/UL — SIGNIFICANT CHANGE UP (ref 1–3.3)
LYMPHOCYTES # BLD AUTO: 18.4 % — SIGNIFICANT CHANGE UP (ref 13–44)
LYMPHOCYTES # BLD AUTO: 18.4 % — SIGNIFICANT CHANGE UP (ref 13–44)
MCHC RBC-ENTMCNC: 31.1 PG — SIGNIFICANT CHANGE UP (ref 27–34)
MCHC RBC-ENTMCNC: 31.1 PG — SIGNIFICANT CHANGE UP (ref 27–34)
MCHC RBC-ENTMCNC: 32.4 GM/DL — SIGNIFICANT CHANGE UP (ref 32–36)
MCHC RBC-ENTMCNC: 32.4 GM/DL — SIGNIFICANT CHANGE UP (ref 32–36)
MCV RBC AUTO: 95.9 FL — SIGNIFICANT CHANGE UP (ref 80–100)
MCV RBC AUTO: 95.9 FL — SIGNIFICANT CHANGE UP (ref 80–100)
MONOCYTES # BLD AUTO: 0.64 K/UL — SIGNIFICANT CHANGE UP (ref 0–0.9)
MONOCYTES # BLD AUTO: 0.64 K/UL — SIGNIFICANT CHANGE UP (ref 0–0.9)
MONOCYTES NFR BLD AUTO: 8.5 % — SIGNIFICANT CHANGE UP (ref 2–14)
MONOCYTES NFR BLD AUTO: 8.5 % — SIGNIFICANT CHANGE UP (ref 2–14)
NEUTROPHILS # BLD AUTO: 5.41 K/UL — SIGNIFICANT CHANGE UP (ref 1.8–7.4)
NEUTROPHILS # BLD AUTO: 5.41 K/UL — SIGNIFICANT CHANGE UP (ref 1.8–7.4)
NEUTROPHILS NFR BLD AUTO: 71.8 % — SIGNIFICANT CHANGE UP (ref 43–77)
NEUTROPHILS NFR BLD AUTO: 71.8 % — SIGNIFICANT CHANGE UP (ref 43–77)
NRBC # BLD: 0 /100 WBCS — SIGNIFICANT CHANGE UP (ref 0–0)
NRBC # BLD: 0 /100 WBCS — SIGNIFICANT CHANGE UP (ref 0–0)
PLATELET # BLD AUTO: 191 K/UL — SIGNIFICANT CHANGE UP (ref 150–400)
PLATELET # BLD AUTO: 191 K/UL — SIGNIFICANT CHANGE UP (ref 150–400)
POTASSIUM SERPL-MCNC: 3.9 MMOL/L — SIGNIFICANT CHANGE UP (ref 3.5–5.3)
POTASSIUM SERPL-MCNC: 3.9 MMOL/L — SIGNIFICANT CHANGE UP (ref 3.5–5.3)
POTASSIUM SERPL-SCNC: 3.9 MMOL/L — SIGNIFICANT CHANGE UP (ref 3.5–5.3)
POTASSIUM SERPL-SCNC: 3.9 MMOL/L — SIGNIFICANT CHANGE UP (ref 3.5–5.3)
PROT SERPL-MCNC: 6.7 G/DL — SIGNIFICANT CHANGE UP (ref 6–8.3)
PROT SERPL-MCNC: 6.7 G/DL — SIGNIFICANT CHANGE UP (ref 6–8.3)
PROTHROM AB SERPL-ACNC: 11.6 SEC — SIGNIFICANT CHANGE UP (ref 9.5–13)
PROTHROM AB SERPL-ACNC: 11.6 SEC — SIGNIFICANT CHANGE UP (ref 9.5–13)
RBC # BLD: 3.44 M/UL — LOW (ref 3.8–5.2)
RBC # BLD: 3.44 M/UL — LOW (ref 3.8–5.2)
RBC # FLD: 12.6 % — SIGNIFICANT CHANGE UP (ref 10.3–14.5)
RBC # FLD: 12.6 % — SIGNIFICANT CHANGE UP (ref 10.3–14.5)
SODIUM SERPL-SCNC: 138 MMOL/L — SIGNIFICANT CHANGE UP (ref 135–145)
SODIUM SERPL-SCNC: 138 MMOL/L — SIGNIFICANT CHANGE UP (ref 135–145)
WBC # BLD: 7.54 K/UL — SIGNIFICANT CHANGE UP (ref 3.8–10.5)
WBC # BLD: 7.54 K/UL — SIGNIFICANT CHANGE UP (ref 3.8–10.5)
WBC # FLD AUTO: 7.54 K/UL — SIGNIFICANT CHANGE UP (ref 3.8–10.5)
WBC # FLD AUTO: 7.54 K/UL — SIGNIFICANT CHANGE UP (ref 3.8–10.5)

## 2023-12-18 PROCEDURE — 72170 X-RAY EXAM OF PELVIS: CPT | Mod: 26

## 2023-12-18 PROCEDURE — 73552 X-RAY EXAM OF FEMUR 2/>: CPT | Mod: 26,LT

## 2023-12-18 PROCEDURE — 99222 1ST HOSP IP/OBS MODERATE 55: CPT

## 2023-12-18 PROCEDURE — 99222 1ST HOSP IP/OBS MODERATE 55: CPT | Mod: GC

## 2023-12-18 PROCEDURE — 72192 CT PELVIS W/O DYE: CPT | Mod: 26,MA

## 2023-12-18 RX ORDER — KETOROLAC TROMETHAMINE 30 MG/ML
10 SYRINGE (ML) INJECTION EVERY 8 HOURS
Refills: 0 | Status: DISCONTINUED | OUTPATIENT
Start: 2023-12-18 | End: 2023-12-18

## 2023-12-18 RX ORDER — CLONAZEPAM 1 MG
2 TABLET ORAL ONCE
Refills: 0 | Status: DISCONTINUED | OUTPATIENT
Start: 2023-12-18 | End: 2023-12-18

## 2023-12-18 RX ORDER — CYCLOBENZAPRINE HYDROCHLORIDE 10 MG/1
5 TABLET, FILM COATED ORAL THREE TIMES A DAY
Refills: 0 | Status: COMPLETED | OUTPATIENT
Start: 2023-12-18 | End: 2023-12-21

## 2023-12-18 RX ORDER — CLONAZEPAM 1 MG
1 TABLET ORAL AT BEDTIME
Refills: 0 | Status: DISCONTINUED | OUTPATIENT
Start: 2023-12-18 | End: 2023-12-22

## 2023-12-18 RX ORDER — SENNA PLUS 8.6 MG/1
2 TABLET ORAL AT BEDTIME
Refills: 0 | Status: DISCONTINUED | OUTPATIENT
Start: 2023-12-18 | End: 2023-12-22

## 2023-12-18 RX ORDER — LOSARTAN POTASSIUM 100 MG/1
1 TABLET, FILM COATED ORAL
Qty: 0 | Refills: 0 | DISCHARGE

## 2023-12-18 RX ORDER — KETOROLAC TROMETHAMINE 30 MG/ML
30 SYRINGE (ML) INJECTION ONCE
Refills: 0 | Status: DISCONTINUED | OUTPATIENT
Start: 2023-12-18 | End: 2023-12-18

## 2023-12-18 RX ORDER — OXYCODONE AND ACETAMINOPHEN 5; 325 MG/1; MG/1
1 TABLET ORAL
Qty: 0 | Refills: 0 | DISCHARGE

## 2023-12-18 RX ORDER — MORPHINE SULFATE 50 MG/1
4 CAPSULE, EXTENDED RELEASE ORAL ONCE
Refills: 0 | Status: DISCONTINUED | OUTPATIENT
Start: 2023-12-18 | End: 2023-12-18

## 2023-12-18 RX ORDER — CYCLOBENZAPRINE HYDROCHLORIDE 10 MG/1
10 TABLET, FILM COATED ORAL ONCE
Refills: 0 | Status: COMPLETED | OUTPATIENT
Start: 2023-12-18 | End: 2023-12-18

## 2023-12-18 RX ORDER — MORPHINE SULFATE 50 MG/1
2 CAPSULE, EXTENDED RELEASE ORAL EVERY 6 HOURS
Refills: 0 | Status: DISCONTINUED | OUTPATIENT
Start: 2023-12-18 | End: 2023-12-19

## 2023-12-18 RX ORDER — FOLIC ACID 0.8 MG
1 TABLET ORAL
Refills: 0 | DISCHARGE

## 2023-12-18 RX ORDER — DIPHENHYDRAMINE HCL 50 MG
1 CAPSULE ORAL
Qty: 0 | Refills: 0 | DISCHARGE

## 2023-12-18 RX ORDER — PYRIDOXINE HCL (VITAMIN B6) 100 MG
2 TABLET ORAL
Refills: 0 | DISCHARGE

## 2023-12-18 RX ORDER — ENOXAPARIN SODIUM 100 MG/ML
30 INJECTION SUBCUTANEOUS EVERY 24 HOURS
Refills: 0 | Status: DISCONTINUED | OUTPATIENT
Start: 2023-12-18 | End: 2023-12-20

## 2023-12-18 RX ORDER — VALSARTAN 80 MG/1
40 TABLET ORAL DAILY
Refills: 0 | Status: DISCONTINUED | OUTPATIENT
Start: 2023-12-18 | End: 2023-12-21

## 2023-12-18 RX ORDER — INFLUENZA VIRUS VACCINE 15; 15; 15; 15 UG/.5ML; UG/.5ML; UG/.5ML; UG/.5ML
0.7 SUSPENSION INTRAMUSCULAR ONCE
Refills: 0 | Status: DISCONTINUED | OUTPATIENT
Start: 2023-12-18 | End: 2023-12-22

## 2023-12-18 RX ORDER — HYDROMORPHONE HYDROCHLORIDE 2 MG/ML
0.2 INJECTION INTRAMUSCULAR; INTRAVENOUS; SUBCUTANEOUS EVERY 6 HOURS
Refills: 0 | Status: DISCONTINUED | OUTPATIENT
Start: 2023-12-18 | End: 2023-12-18

## 2023-12-18 RX ORDER — SODIUM CHLORIDE 9 MG/ML
1000 INJECTION, SOLUTION INTRAVENOUS ONCE
Refills: 0 | Status: COMPLETED | OUTPATIENT
Start: 2023-12-18 | End: 2023-12-18

## 2023-12-18 RX ORDER — PREGABALIN 225 MG/1
1 CAPSULE ORAL
Refills: 0 | DISCHARGE

## 2023-12-18 RX ORDER — CLONAZEPAM 1 MG
1 TABLET ORAL DAILY
Refills: 0 | Status: DISCONTINUED | OUTPATIENT
Start: 2023-12-18 | End: 2023-12-19

## 2023-12-18 RX ORDER — KETOROLAC TROMETHAMINE 30 MG/ML
15 SYRINGE (ML) INJECTION EVERY 6 HOURS
Refills: 0 | Status: DISCONTINUED | OUTPATIENT
Start: 2023-12-18 | End: 2023-12-19

## 2023-12-18 RX ORDER — ACETAMINOPHEN 500 MG
650 TABLET ORAL EVERY 6 HOURS
Refills: 0 | Status: DISCONTINUED | OUTPATIENT
Start: 2023-12-18 | End: 2023-12-19

## 2023-12-18 RX ORDER — POLYETHYLENE GLYCOL 3350 17 G/17G
17 POWDER, FOR SOLUTION ORAL DAILY
Refills: 0 | Status: DISCONTINUED | OUTPATIENT
Start: 2023-12-18 | End: 2023-12-22

## 2023-12-18 RX ADMIN — MORPHINE SULFATE 2 MILLIGRAM(S): 50 CAPSULE, EXTENDED RELEASE ORAL at 13:58

## 2023-12-18 RX ADMIN — Medication 2 MILLIGRAM(S): at 05:08

## 2023-12-18 RX ADMIN — Medication 30 MILLIGRAM(S): at 05:04

## 2023-12-18 RX ADMIN — ENOXAPARIN SODIUM 30 MILLIGRAM(S): 100 INJECTION SUBCUTANEOUS at 10:58

## 2023-12-18 RX ADMIN — Medication 30 MILLIGRAM(S): at 04:25

## 2023-12-18 RX ADMIN — Medication 650 MILLIGRAM(S): at 22:40

## 2023-12-18 RX ADMIN — CYCLOBENZAPRINE HYDROCHLORIDE 10 MILLIGRAM(S): 10 TABLET, FILM COATED ORAL at 04:25

## 2023-12-18 RX ADMIN — MORPHINE SULFATE 4 MILLIGRAM(S): 50 CAPSULE, EXTENDED RELEASE ORAL at 09:14

## 2023-12-18 RX ADMIN — Medication 650 MILLIGRAM(S): at 00:00

## 2023-12-18 RX ADMIN — Medication 650 MILLIGRAM(S): at 21:40

## 2023-12-18 RX ADMIN — Medication 1 MILLIGRAM(S): at 18:14

## 2023-12-18 RX ADMIN — Medication 650 MILLIGRAM(S): at 10:11

## 2023-12-18 RX ADMIN — SODIUM CHLORIDE 1000 MILLILITER(S): 9 INJECTION, SOLUTION INTRAVENOUS at 10:59

## 2023-12-18 RX ADMIN — Medication 1 MILLIGRAM(S): at 21:39

## 2023-12-18 RX ADMIN — CYCLOBENZAPRINE HYDROCHLORIDE 5 MILLIGRAM(S): 10 TABLET, FILM COATED ORAL at 13:58

## 2023-12-18 RX ADMIN — MORPHINE SULFATE 2 MILLIGRAM(S): 50 CAPSULE, EXTENDED RELEASE ORAL at 14:00

## 2023-12-18 RX ADMIN — MORPHINE SULFATE 4 MILLIGRAM(S): 50 CAPSULE, EXTENDED RELEASE ORAL at 06:55

## 2023-12-18 RX ADMIN — CYCLOBENZAPRINE HYDROCHLORIDE 5 MILLIGRAM(S): 10 TABLET, FILM COATED ORAL at 21:40

## 2023-12-18 NOTE — PATIENT PROFILE ADULT - FALL HARM RISK - HARM RISK INTERVENTIONS
Assistance with ambulation/Assistance OOB with selected safe patient handling equipment/Communicate Risk of Fall with Harm to all staff/Discuss with provider need for PT consult/Monitor gait and stability/Provide patient with walking aids - walker, cane, crutches/Reinforce activity limits and safety measures with patient and family/Tailored Fall Risk Interventions/Visual Cue: Yellow wristband and red socks/Bed in lowest position, wheels locked, appropriate side rails in place/Call bell, personal items and telephone in reach/Instruct patient to call for assistance before getting out of bed or chair/Non-slip footwear when patient is out of bed/[place] to call system/Physically safe environment - no spills, clutter or unnecessary equipment/Purposeful Proactive Rounding/Room/bathroom lighting operational, light cord in reach Assistance with ambulation/Assistance OOB with selected safe patient handling equipment/Communicate Risk of Fall with Harm to all staff/Discuss with provider need for PT consult/Monitor gait and stability/Provide patient with walking aids - walker, cane, crutches/Reinforce activity limits and safety measures with patient and family/Tailored Fall Risk Interventions/Visual Cue: Yellow wristband and red socks/Bed in lowest position, wheels locked, appropriate side rails in place/Call bell, personal items and telephone in reach/Instruct patient to call for assistance before getting out of bed or chair/Non-slip footwear when patient is out of bed/[place] to call system/Physically safe environment - no spills, clutter or unnecessary equipment/Purposeful Proactive Rounding/Room/bathroom lighting operational, light cord in reach

## 2023-12-18 NOTE — PHARMACOTHERAPY INTERVENTION NOTE - COMMENTS
Medication reconciliation was done with patient and [facility] ([address] [facility], [place] [phone]) and the outpatient medication review was updated:  ·	Percocet 5/325 mg is prescribed as 1 tablet three times a day; patient uses if needed for pain  ·	Clonazepam is prescribed as 1 mg two times a day; patient does not typically require the daytime dose  ·	Valsartan is prescribed as 80 mg once daily; patient has trialled 40 mg daily with good effect    Allergies clarified: reaction to penicillin is not known; reaction to oral NSAIDs is gastrointestinal intolerance (ketorolac by injection was tolerated) Medication reconciliation was done with patient and [facility] ([address] [facility], [place] [phone]) and the outpatient medication review was updated:  ·	Percocet 5/325 mg is prescribed as 1 tablet three times a day; patient uses if needed for pain  ·	Clonazepam is prescribed as 1 mg two times a day; patient does not typically require the daytime dose  ·	Valsartan is prescribed as 80 mg once daily; patient has trialled 40 mg daily with good effect    Allergies clarified: reaction to penicillin is not known; reaction to oral NSAIDs is gastrointestinal intolerance (ketorolac by injection was tolerated)

## 2023-12-18 NOTE — H&P ADULT - NSHPPHYSICALEXAM_GEN_ALL_CORE
GENERAL: NAD, lying in bed, thin built, changing position to find a comfortable spot   HEAD:  Atraumatic, Normocephalic  EYES: EOMI, PERRLA, conjunctiva and sclera clear, bilateral mild erythema and swelling of the eyelids without any discharge   ENT: dry mucous membranes  NECK: Supple, No JVD  CHEST/LUNG: Clear to auscultation bilaterally; No rales, rhonchi, wheezing, or rubs. Unlabored respirations  HEART: Regular rate and rhythm; No murmurs, rubs, or gallops  ABDOMEN: Bowel sounds present; Soft, Nontender, Nondistended.   EXTREMITIES:  2+ Peripheral Pulses, brisk capillary refill. No clubbing, cyanosis, or edema  NERVOUS SYSTEM:  Alert & Oriented X3, speech clear.   SKIN: No rashes or lesions GENERAL: NAD, lying in bed, thin built, changing position to find a comfortable spot   HEAD:  Atraumatic, Normocephalic  EYES: EOMI, PERRLA, conjunctiva and sclera clear, bilateral mild erythema and swelling of the eyelids without any discharge   ENT: dry mucous membranes  NECK: Supple, No JVD  CHEST/LUNG: Clear to auscultation bilaterally; No rales, rhonchi, wheezing, or rubs. Unlabored respirations  HEART: Regular rate and rhythm; No murmurs, rubs, or gallops  ABDOMEN: Bowel sounds present; Soft, Nontender, Nondistended.   EXTREMITIES:  2+ Peripheral Pulses, brisk capillary refill. No clubbing, cyanosis, or edema  NERVOUS SYSTEM:  Alert & Oriented X3, speech clear. Sensation intact on all extremities, passive and active range of motion of the lower extremity associated with pain 2/2 fracture   SKIN: No rashes or lesions

## 2023-12-18 NOTE — H&P ADULT - PROBLEM SELECTOR PLAN 1
p/w mechanical fall, hip and back pain  CT scan noted for  Acute comminuted and mildly displaced fracture of the left superior pubic ramus extending into the left pubic symphysis and left inferior pubic ramus.  likely no acute surgical intervention  c/w pain mnx: Percocet, Toradol, Morphine for mild, mod and severe pain   c/w Flexeril for 3 days   Ortho consulted by ED  Neurosurgery consulted - no acute intervention required at this time

## 2023-12-18 NOTE — H&P ADULT - ASSESSMENT
68 yrs old F, from home, ambulating independently, pmhx HTN, anxiety, pshx appendectomy, presented with back and hip pain s/p mechanical fall. Pt is admitted for pain management  for  Acute comminuted and mildly displaced fracture of the left superior pubic ramus extending into the left pubic symphysis and left inferior   pubic ramus.Acute comminuted and nondisplaced left sacral ala fracture. 68 yrs old F, from home, ambulating independently, pmhx HTN, anxiety, pshx appendectomy, presented with back and hip pain s/p mechanical fall. Pt is admitted for pain management  for  Acute comminuted and mildly displaced fracture of the left superior pubic ramus extending into the left pubic symphysis and left inferior   pubic ramus. Acute comminuted and nondisplaced left sacral ala fracture.

## 2023-12-18 NOTE — H&P ADULT - HISTORY OF PRESENT ILLNESS
68 yrs old F, from home, ambulating independently, pmhx  68 yrs old F, from home, ambulating independently, pmhx HTN, anxiety, pshx appendectomy, presented with back and hip pain s/p mechanical fall. Pt stated that she is unsure of how she has fallen down, did not trip but likely when turning lost balance and hit the wall or the floor. Pt denied chest pain, palpitation, dyspnea, dizziness, visual changes, abdominal pain, N/V/D, dysuria, fever, chills, unilateral weakness or decreased sensation however has been generally weak lately s/p appendectomy and was on the physical therapy at home for regaining her strength. Pt denied saddle anesthesia, urinary or bladder incontinence however endorsed inability to urinate despite the urge.   Pt denied alcohol consumption, smoking or use of illicit drugs. She endorsed getting Morphine after the surgery which was prescribed however no longer taking.

## 2023-12-18 NOTE — PATIENT PROFILE ADULT - HAS THE PATIENT RECEIVED THE INFLUENZA VACCINE THIS SEASON?
59F w/ DM, HLD, HTN p/w numbness and burning sensation in all distal extremities, no other neuro deficits, awake, alert, no cervical tenderness, DM reportedly well controlled, doubt cva given symptoms are bilateral and so distal, double be diabetic neuropathy, exam otherwise intact, will hold imaging for now no...

## 2023-12-18 NOTE — H&P ADULT - ATTENDING COMMENTS
A/p# Acute Td Pubic Rami fracture # Acute Sacral fracture # Acute urinary retention    -patient had mechanical fall and fell on her side. no head injury, no prodrome , cardiac or neuro s/s. No loc  -c/o pain in lower back and hip  -case d/w Ortho- PA- Ortiz- conservative management, neuro consult appreciated- no neuro s.s,- conservative management  -straight cath done for urinary retention  -multimodal pain meds - percocet, iv morphine, iv ketorolac and flexeril  -PT/OT  c/w home klonopin  -CM consult- will need SNF likely

## 2023-12-18 NOTE — CONSULT NOTE ADULT - SUBJECTIVE AND OBJECTIVE BOX
SUBJECTIVE:   68-year-old female, with a hx of HTN, HLD, osteoporosis, who presented to ED with left hip/leg pain after a fall. Patient reports she was making her bed when she fell backwards.  Denies hitting her head or losing consciousness.  Has lower leg pain and feels that she cannot move her leg due to pain and weakness. No bowel/bladder dysfunction. Admitted to medicine for management and pain control. CT pelvis performed which illustrated: acute comminuted and mildly displaced fracture of the left superior pubic ramus extending into the left pubic symphysis and left inferior   pubic ramus, along acute comminuted and nondisplaced left sacral ala fracture.      Vital Signs Last 24 Hrs  T(C): 36.9 (18 Dec 2023 07:40), Max: 36.9 (18 Dec 2023 07:40)  T(F): 98.4 (18 Dec 2023 07:40), Max: 98.4 (18 Dec 2023 07:40)  HR: 85 (18 Dec 2023 07:40) (85 - 88)  BP: 118/71 (18 Dec 2023 07:40) (118/71 - 148/85)  BP(mean): 106 (17 Dec 2023 23:38) (106 - 106)  RR: 18 (18 Dec 2023 07:40) (18 - 18)  SpO2: 95% (18 Dec 2023 07:40) (95% - 98%)    Parameters below as of 18 Dec 2023 07:40  Patient On (Oxygen Delivery Method): room air      PHYSICAL EXAM:    Constitutional: No Acute Distress     Neurological: AOx3, Following Commands, Moving all Extremities. 5/5 B/L LE.                                                  Sensation: [x] intact to light touch  [] decreased:     Extremities: No calf tenderness       LABS:                        10.7   7.54  )-----------( 191      ( 18 Dec 2023 07:07 )             33.0    12-18    138  |  103  |  13  ----------------------------<  98  3.9   |  30  |  0.63    Ca    9.1      18 Dec 2023 07:07    TPro  6.7  /  Alb  3.7  /  TBili  0.5  /  DBili  x   /  AST  25  /  ALT  30  /  AlkPhos  58  12-18  PT/INR - ( 18 Dec 2023 07:07 )   PT: 11.6 sec;   INR: 1.02 ratio         PTT - ( 18 Dec 2023 07:07 )  PTT:28.0 sec        MEDICATIONS:  Anticoagulation:   enoxaparin Injectable 30 milliGRAM(s) SubCutaneous every 24 hours    Antibiotics:    Endo:    Neuro:  acetaminophen     Tablet .. 650 milliGRAM(s) Oral every 6 hours PRN Temp greater or equal to 38C (100.4F), Mild Pain (1 - 3)  cyclobenzaprine 5 milliGRAM(s) Oral three times a day  ketorolac 10 milliGRAM(s) Oral every 8 hours PRN Moderate Pain (4 - 6)  morphine  - Injectable 2 milliGRAM(s) IV Push every 6 hours PRN Severe Pain (7 - 10)  oxycodone    5 mG/acetaminophen 325 mG 1 Tablet(s) Oral every 6 hours    Cardiac:    Pulm:    GI/:  polyethylene glycol 3350 17 Gram(s) Oral daily PRN Constipation  senna 2 Tablet(s) Oral at bedtime PRN Constipation    Other:     IMAGING:   < from: CT Pelvis Bony Only No Cont (12.18.23 @ 06:05) >  FINDINGS:    Bone: An acute comminuted and mildly displaced fracture of the left   superior pubic ramus is seen extending into the left pubic symphysis and   left inferior pubic ramus. An acute comminuted and nondisplaced left   sacral ala fracture is noted. An old right superior and inferior pubic   ramus fractures are seen. No additional fracture or dislocation is   demonstrated.    Soft tissues: Mild subcutaneous inflammatory change is seen in the   lateral left hip. Enlargement of the left adductor musculature is noted   likely due to muscle strain and/or trauma.    Miscellaneous: Colonic diverticulosis is noted without diverticulitis.   The patient is status post a supracervical hysterectomy. No adnexal   masses are present. Urinary bladder is distended. Mild inflammatory   change is seen in the inferior left pelvis superior to the left superior   pubic ramus fracture.    IMPRESSION:    1. Acute comminuted and mildly displaced fracture of the left superior   pubic ramus extending into the left pubic symphysis and left inferior   pubic ramus.  2. Acute comminuted and nondisplaced left sacral ala fracture.    --- End of Report ---      < end of copied text >

## 2023-12-18 NOTE — ED PROVIDER NOTE - PROGRESS NOTE DETAILS
xrya without fracture on my review of the images. patient still has pain and cannot move leg. will obtain CT. Memo Hayes ortho consulted. not a GOCHO patient. medicine admission with ortho following. Memo Hayes Attending Bridget: received sign out pending labs for admission. labs w/o emergent findings. endorsed to hospitalist Dr. Roach and MAR.

## 2023-12-18 NOTE — H&P ADULT - PROBLEM SELECTOR PLAN 3
urinary retention s/p fall   bladder scan showed ~600 ml urine  s/p straight cath   monitor by  bladder scan post-void to rule out retention if pt persistently unable to pass urine   will try to avoid Ramos catheter

## 2023-12-18 NOTE — H&P ADULT - PROBLEM SELECTOR PLAN 5
hx of Anxiety on Clonazepam 1 mg BID which she takes the evening dose regularly and morning as needed  c/w Clonazepam 1 mg at bedtime and PRN for AM

## 2023-12-18 NOTE — ED PROVIDER NOTE - PHYSICAL EXAMINATION
General: well appearing female, no acute distress   HEENT: normocephalic, atraumatic   Respiratory: normal work of breathing  Cardiac: regular rate and rhythm, 2+ left DP  MSK: LLE tenderness to palpation, normal length and rotation   Skin: warm, dry   Neuro: A&Ox3  Psych: appropriate affect

## 2023-12-18 NOTE — CONSULT NOTE ADULT - SUBJECTIVE AND OBJECTIVE BOX
ROULA PASTOR  [id number]    Orthopedic Consult:    Orthopedic Diagnosis:      ROULA [id number]  HPI:  68-year-old female, with a hx of HTN, HLD, osteoporosis, who presented to ED with left hip/leg pain after a fall. Patient reports she was making her bed when she fell backwards.  Denies hitting her head or losing consciousness.  Has lower leg pain and feels that she cannot move her leg due to pain and weakness. No bowel/bladder dysfunction. Admitted to medicine for management and pain control. CT pelvis performed which illustrated: acute comminuted and mildly displaced fracture of the left superior pubic ramus extending into the left pubic symphysis and left inferior   pubic ramus, along acute comminuted and nondisplaced left sacral ala fracture.        Ambulation: Independent    PAST MEDICAL & SURGICAL HISTORY:  Cervical disc disorder at C6-C7 level with radiculopathy  with ulnar nerve compression      Anxiety      Carpal tunnel syndrome of left wrist      Breast mass, left      Uterine fibroid      Cataract  bilateral eyes      HPV in female      Fibrocystic breast disease      Subclavian arterial stenosis  left side 2018      Foot fracture, left  2016, s/p pins/screws      Osteoporosis      Chronic pain      History of spinal fusion  cervical C5-C7 with metal hardware-2012 ( discectomy)      S/P carpal tunnel release  left-2015      S/P decompression of ulnar nerve at elbow  left -2015 ( done with carpal tunnel release)      S/P lumpectomy, left breast  2010      S/P hysterectomy  2008      H/O bilateral breast reduction surgery  2002      S/P cataract surgery, right  with artificial lenses- November 2016      S/P tonsillectomy      S/P cataract surgery, left      S/P ORIF (open reduction internal fixation) fracture  left foot          FAMILY HISTORY:  Family history of non-Hodgkin's lymphoma (Mother)    Acute myocardial infarction (Father)        Social History:      Allergies    Avelox (Other)  NSAIDs (Other; Stomach Upset)  penicillin (Other)    Intolerances        Home Medications:  areds: 1   2 times a day (27 Dec 2018 16:55)  Benadryl 50 mg oral capsule: 1 cap(s) orally once (27 Dec 2018 16:55)  cbd: vape (27 Dec 2018 16:55)  Fish Oil 1200 mg oral capsule: 1 cap(s) orally once a day (27 Dec 2018 16:55)  KlonoPIN 1 mg oral tablet: 1 tab(s) orally 2 times a day, As Needed (27 Dec 2018 16:55)  losartan 25 mg oral tablet: 1 tab(s) orally once a day (27 Dec 2018 16:55)  medical marijuana: 0.4 milliliters) orally once a day (27 Dec 2018 16:55)  oxyCODONE-acetaminophen 10 mg-325 mg oral tablet: 1 tab(s) orally every 6 hours (27 Dec 2018 16:55)  Probiotic Formula oral capsule: 1 cap(s) orally once a day (27 Dec 2018 16:55)  tumeric: 900 milligram(s) orally once a day (27 Dec 2018 16:55)  Vitamin B12 500 mcg oral tablet: 1 tab(s) orally once a day (27 Dec 2018 16:55)  Vitamin B6 50 mg oral tablet: 1 tab(s) orally once a day (27 Dec 2018 16:55)  Vitamin D3: 1 tab(s) orally once a day (27 Dec 2018 16:55)      Vital Signs Last 24 Hrs  T(C): 36.6 (18 Dec 2023 16:00), Max: 36.9 (18 Dec 2023 07:40)  T(F): 97.8 (18 Dec 2023 16:00), Max: 98.4 (18 Dec 2023 07:40)  HR: 90 (18 Dec 2023 16:00) (84 - 90)  BP: 119/75 (18 Dec 2023 16:00) (118/71 - 148/85)  BP(mean): 90 (18 Dec 2023 16:00) (90 - 106)  RR: 18 (18 Dec 2023 16:00) (17 - 18)  SpO2: 98% (18 Dec 2023 16:00) (95% - 98%)    Parameters below as of 18 Dec 2023 16:00  Patient On (Oxygen Delivery Method): room air      I&O's Summary      Physical Exam:  General: Alert and oriented, NAD, resting comfortably  Musculoskeletal:  Hips: Skin warm and NTTP b/l. No leg length discrepancy noted. Pelvis inlet/outlet stable. Left groin/buttock TTP  Lower extremities: Calves soft and NTTP b/l. SILT. NVI. (+)EHL/FHL/ADF/APF intact bilaterally    Labs:                        10.7   7.54  )-----------( 191      ( 18 Dec 2023 07:07 )             33.0     12-18    138  |  103  |  13  ----------------------------<  98  3.9   |  30  |  0.63    Ca    9.1      18 Dec 2023 07:07    TPro  6.7  /  Alb  3.7  /  TBili  0.5  /  DBili  x   /  AST  25  /  ALT  30  /  AlkPhos  58  12-18    PT/INR - ( 18 Dec 2023 07:07 )   PT: 11.6 sec;   INR: 1.02 ratio         PTT - ( 18 Dec 2023 07:07 )  PTT:28.0 sec    Radiology: < from: CT Pelvis Bony Only No Cont (12.18.23 @ 06:05) >  COMPARISON: CT of the abdomen and pelvis from 10/7/2021.    FINDINGS:    Bone: An acute comminuted and mildly displaced fracture of the left   superior pubic ramus is seen extending into the left pubic symphysis and   left inferior pubic ramus. An acute comminuted and nondisplaced left   sacral ala fracture is noted. An old right superior and inferior pubic   ramus fractures are seen. No additional fracture or dislocation is   demonstrated.    Soft tissues: Mild subcutaneous inflammatory change is seen in the   lateral left hip. Enlargement of the left adductor musculature is noted   likely due to muscle strain and/or trauma.    Miscellaneous: Colonic diverticulosis is noted without diverticulitis.   The patient is status post a supracervical hysterectomy. No adnexal   masses are present. Urinary bladder is distended. Mild inflammatory   change is seen in the inferior left pelvis superior to the left superior   pubic ramus fracture.    IMPRESSION:    1. Acute comminuted and mildly displaced fracture of the left superior   pubic ramus extending into the left pubic symphysis and left inferior   pubic ramus.  2. Acute comminuted and nondisplaced left sacral ala fracture.          Impression: Patient is a 68yFemale with Left pubic ramus fracture  Plan:  - Recommendation: [XX ] Conservative management [  ] Surgical intervention  - Pain management  - DVT prophylaxis  - Daily PT - WBAT to lower extremities with appropriate assistive device   - Patient is orthopedically stable   - Patient is to follow up with Orthopedic Surgeon, Dr. Rubin in office in TWO WEEKS at: [phone]    ROULA PASTOR  [id number]    Orthopedic Consult:    Orthopedic Diagnosis:      ROULA [id number]  HPI:  68-year-old female, with a hx of HTN, HLD, osteoporosis, who presented to ED with left hip/leg pain after a fall. Patient reports she was making her bed when she fell backwards.  Denies hitting her head or losing consciousness.  Has lower leg pain and feels that she cannot move her leg due to pain and weakness. No bowel/bladder dysfunction. Admitted to medicine for management and pain control. CT pelvis performed which illustrated: acute comminuted and mildly displaced fracture of the left superior pubic ramus extending into the left pubic symphysis and left inferior   pubic ramus, along acute comminuted and nondisplaced left sacral ala fracture.        Ambulation: Independent    PAST MEDICAL & SURGICAL HISTORY:  Cervical disc disorder at C6-C7 level with radiculopathy  with ulnar nerve compression      Anxiety      Carpal tunnel syndrome of left wrist      Breast mass, left      Uterine fibroid      Cataract  bilateral eyes      HPV in female      Fibrocystic breast disease      Subclavian arterial stenosis  left side 2018      Foot fracture, left  2016, s/p pins/screws      Osteoporosis      Chronic pain      History of spinal fusion  cervical C5-C7 with metal hardware-2012 ( discectomy)      S/P carpal tunnel release  left-2015      S/P decompression of ulnar nerve at elbow  left -2015 ( done with carpal tunnel release)      S/P lumpectomy, left breast  2010      S/P hysterectomy  2008      H/O bilateral breast reduction surgery  2002      S/P cataract surgery, right  with artificial lenses- November 2016      S/P tonsillectomy      S/P cataract surgery, left      S/P ORIF (open reduction internal fixation) fracture  left foot          FAMILY HISTORY:  Family history of non-Hodgkin's lymphoma (Mother)    Acute myocardial infarction (Father)        Social History:      Allergies    Avelox (Other)  NSAIDs (Other; Stomach Upset)  penicillin (Other)    Intolerances        Home Medications:  areds: 1   2 times a day (27 Dec 2018 16:55)  Benadryl 50 mg oral capsule: 1 cap(s) orally once (27 Dec 2018 16:55)  cbd: vape (27 Dec 2018 16:55)  Fish Oil 1200 mg oral capsule: 1 cap(s) orally once a day (27 Dec 2018 16:55)  KlonoPIN 1 mg oral tablet: 1 tab(s) orally 2 times a day, As Needed (27 Dec 2018 16:55)  losartan 25 mg oral tablet: 1 tab(s) orally once a day (27 Dec 2018 16:55)  medical marijuana: 0.4 milliliters) orally once a day (27 Dec 2018 16:55)  oxyCODONE-acetaminophen 10 mg-325 mg oral tablet: 1 tab(s) orally every 6 hours (27 Dec 2018 16:55)  Probiotic Formula oral capsule: 1 cap(s) orally once a day (27 Dec 2018 16:55)  tumeric: 900 milligram(s) orally once a day (27 Dec 2018 16:55)  Vitamin B12 500 mcg oral tablet: 1 tab(s) orally once a day (27 Dec 2018 16:55)  Vitamin B6 50 mg oral tablet: 1 tab(s) orally once a day (27 Dec 2018 16:55)  Vitamin D3: 1 tab(s) orally once a day (27 Dec 2018 16:55)      Vital Signs Last 24 Hrs  T(C): 36.6 (18 Dec 2023 16:00), Max: 36.9 (18 Dec 2023 07:40)  T(F): 97.8 (18 Dec 2023 16:00), Max: 98.4 (18 Dec 2023 07:40)  HR: 90 (18 Dec 2023 16:00) (84 - 90)  BP: 119/75 (18 Dec 2023 16:00) (118/71 - 148/85)  BP(mean): 90 (18 Dec 2023 16:00) (90 - 106)  RR: 18 (18 Dec 2023 16:00) (17 - 18)  SpO2: 98% (18 Dec 2023 16:00) (95% - 98%)    Parameters below as of 18 Dec 2023 16:00  Patient On (Oxygen Delivery Method): room air      I&O's Summary      Physical Exam:  General: Alert and oriented, NAD, resting comfortably  Musculoskeletal:  Hips: Skin warm and NTTP b/l. No leg length discrepancy noted. Pelvis inlet/outlet stable. Left groin/buttock TTP  Lower extremities: Calves soft and NTTP b/l. SILT. NVI. (+)EHL/FHL/ADF/APF intact bilaterally    Labs:                        10.7   7.54  )-----------( 191      ( 18 Dec 2023 07:07 )             33.0     12-18    138  |  103  |  13  ----------------------------<  98  3.9   |  30  |  0.63    Ca    9.1      18 Dec 2023 07:07    TPro  6.7  /  Alb  3.7  /  TBili  0.5  /  DBili  x   /  AST  25  /  ALT  30  /  AlkPhos  58  12-18    PT/INR - ( 18 Dec 2023 07:07 )   PT: 11.6 sec;   INR: 1.02 ratio         PTT - ( 18 Dec 2023 07:07 )  PTT:28.0 sec    Radiology: < from: CT Pelvis Bony Only No Cont (12.18.23 @ 06:05) >  COMPARISON: CT of the abdomen and pelvis from 10/7/2021.    FINDINGS:    Bone: An acute comminuted and mildly displaced fracture of the left   superior pubic ramus is seen extending into the left pubic symphysis and   left inferior pubic ramus. An acute comminuted and nondisplaced left   sacral ala fracture is noted. An old right superior and inferior pubic   ramus fractures are seen. No additional fracture or dislocation is   demonstrated.    Soft tissues: Mild subcutaneous inflammatory change is seen in the   lateral left hip. Enlargement of the left adductor musculature is noted   likely due to muscle strain and/or trauma.    Miscellaneous: Colonic diverticulosis is noted without diverticulitis.   The patient is status post a supracervical hysterectomy. No adnexal   masses are present. Urinary bladder is distended. Mild inflammatory   change is seen in the inferior left pelvis superior to the left superior   pubic ramus fracture.    IMPRESSION:    1. Acute comminuted and mildly displaced fracture of the left superior   pubic ramus extending into the left pubic symphysis and left inferior   pubic ramus.  2. Acute comminuted and nondisplaced left sacral ala fracture.          Impression: Patient is a 68yFemale with Left pubic ramus fracture  Plan:  - Recommendation: [XX ] Conservative management [  ] Surgical intervention  - Pain management  - DVT prophylaxis  - Daily PT - WBAT to lower extremities with appropriate assistive device   - Patient is orthopedically stable   - Patient is to follow up with Orthopedic Surgeon, Dr. Rubin in office in TWO WEEKS at: [phone]

## 2023-12-18 NOTE — H&P ADULT - NSHPREVIEWOFSYSTEMS_GEN_ALL_CORE
REVIEW OF SYSTEMS:    CONSTITUTIONAL: No weakness, fevers or chills  EYES/ENT: No visual changes;  No vertigo or throat pain   NECK: No pain or stiffness  RESPIRATORY: No cough, wheezing, hemoptysis; No shortness of breath  CARDIOVASCULAR: No chest pain or palpitations  GASTROINTESTINAL: No abdominal or epigastric pain. No nausea, vomiting, or hematemesis; No diarrhea or constipation. No melena or hematochezia.  GENITOURINARY: No dysuria, frequency or hematuria, + urinary retention   MSK: back pain and hip pain s/p fall   NEUROLOGICAL: No numbness or weakness  SKIN: No itching, rashes

## 2023-12-18 NOTE — CONSULT NOTE ADULT - ASSESSMENT
68-year-old female, with a hx of HTN, HLD, osteoporosis, who presented to ED with left hip/leg pain after a fall. CT pelvis performed which illustrated: acute comminuted and mildly displaced fracture of the left superior pubic ramus extending into the left pubic symphysis and left inferior pubic ramus, along acute comminuted and nondisplaced left sacral ala fracture.  Plan:   Neurosurgery:   No neurosurgical intervention recommended at this time  Conservative management with pain management for pain control and PT for strengthening   WBAT   DVT ppx   Bowel regimen   Plan of care discussed with Chief of Neurosurgery Dr. Jl Gage

## 2023-12-18 NOTE — ED PROVIDER NOTE - IV ALTEPLASE ADMIN OUTSIDE HIDDEN
MD Breaux notified, will have pt. sign waiver denying acetaminaphen allergy. Will continue to monitor. show

## 2023-12-18 NOTE — ED PROVIDER NOTE - CARE PLAN
Principal Discharge DX:	Closed fracture of multiple pubic rami  Secondary Diagnosis:	Sacral fracture   1

## 2023-12-18 NOTE — ED PROVIDER NOTE - OBJECTIVE STATEMENT
68-year-old female presenting with left hip/leg pain after a fall.  Patient reports she was making her bed when she fell backwards.  Denies hitting her head or losing consciousness.  Has lower leg pain and feels that she cannot move her leg.  Denies blood any blood thinning medication.

## 2023-12-18 NOTE — H&P ADULT - PROBLEM SELECTOR PLAN 2
p/w mechanical fall, hip and back pain  CT scan noted for Acute comminuted and nondisplaced left sacral ala fracture.  c/w pain mnx: Percocet, Toradol, Morphine for mild, mod and severe pain   c/w Flexeril for 3 days   Ortho consulted by ED  Neurosurgery consulted - no acute intervention required at this time

## 2023-12-18 NOTE — H&P ADULT - PROBLEM SELECTOR PLAN 4
hx of HTN Valsartan 80 mg however takes half the dose  in ED afebrile, HR 88, /85, Sat 98% on RA   repeated blood pressure showed -130   c/w Valsartan 40 mg with holding parameters and titrate up if BP sustained >140/90

## 2023-12-18 NOTE — ED PROVIDER NOTE - CLINICAL SUMMARY MEDICAL DECISION MAKING FREE TEXT BOX
68-year-old female presenting with left leg pain after a fall.  Concern for hip fracture.  Will obtain x-rays to assess.

## 2023-12-18 NOTE — ED ADULT NURSE NOTE - NSFALLRISKINTERV_ED_ALL_ED
Assistance OOB with selected safe patient handling equipment if applicable/Communicate fall risk and risk factors to all staff, patient, and family/Provide visual cue: yellow wristband, yellow gown, etc/Reinforce activity limits and safety measures with patient and family/Call bell, personal items and telephone in reach/Instruct patient to call for assistance before getting out of bed/chair/stretcher/Non-slip footwear applied when patient is off stretcher/[place] to call system/Physically safe environment - no spills, clutter or unnecessary equipment/Purposeful Proactive Rounding/Room/bathroom lighting operational, light cord in reach Assistance OOB with selected safe patient handling equipment if applicable/Communicate fall risk and risk factors to all staff, patient, and family/Provide visual cue: yellow wristband, yellow gown, etc/Reinforce activity limits and safety measures with patient and family/Call bell, personal items and telephone in reach/Instruct patient to call for assistance before getting out of bed/chair/stretcher/Non-slip footwear applied when patient is off stretcher/[place] to call system/Physically safe environment - no spills, clutter or unnecessary equipment/Purposeful Proactive Rounding/Room/bathroom lighting operational, light cord in reach

## 2023-12-19 DIAGNOSIS — M81.0 AGE-RELATED OSTEOPOROSIS WITHOUT CURRENT PATHOLOGICAL FRACTURE: ICD-10-CM

## 2023-12-19 DIAGNOSIS — G89.29 OTHER CHRONIC PAIN: ICD-10-CM

## 2023-12-19 DIAGNOSIS — M25.552 PAIN IN LEFT HIP: ICD-10-CM

## 2023-12-19 LAB
ALBUMIN SERPL ELPH-MCNC: 3 G/DL — LOW (ref 3.5–5)
ALBUMIN SERPL ELPH-MCNC: 3 G/DL — LOW (ref 3.5–5)
ALP SERPL-CCNC: 57 U/L — SIGNIFICANT CHANGE UP (ref 40–120)
ALP SERPL-CCNC: 57 U/L — SIGNIFICANT CHANGE UP (ref 40–120)
ALT FLD-CCNC: 24 U/L DA — SIGNIFICANT CHANGE UP (ref 10–60)
ALT FLD-CCNC: 24 U/L DA — SIGNIFICANT CHANGE UP (ref 10–60)
ANION GAP SERPL CALC-SCNC: 4 MMOL/L — LOW (ref 5–17)
ANION GAP SERPL CALC-SCNC: 4 MMOL/L — LOW (ref 5–17)
AST SERPL-CCNC: 20 U/L — SIGNIFICANT CHANGE UP (ref 10–40)
AST SERPL-CCNC: 20 U/L — SIGNIFICANT CHANGE UP (ref 10–40)
BASOPHILS # BLD AUTO: 0.03 K/UL — SIGNIFICANT CHANGE UP (ref 0–0.2)
BASOPHILS # BLD AUTO: 0.03 K/UL — SIGNIFICANT CHANGE UP (ref 0–0.2)
BASOPHILS NFR BLD AUTO: 0.5 % — SIGNIFICANT CHANGE UP (ref 0–2)
BASOPHILS NFR BLD AUTO: 0.5 % — SIGNIFICANT CHANGE UP (ref 0–2)
BILIRUB SERPL-MCNC: 0.6 MG/DL — SIGNIFICANT CHANGE UP (ref 0.2–1.2)
BILIRUB SERPL-MCNC: 0.6 MG/DL — SIGNIFICANT CHANGE UP (ref 0.2–1.2)
BUN SERPL-MCNC: 9 MG/DL — SIGNIFICANT CHANGE UP (ref 7–18)
BUN SERPL-MCNC: 9 MG/DL — SIGNIFICANT CHANGE UP (ref 7–18)
CALCIUM SERPL-MCNC: 8.5 MG/DL — SIGNIFICANT CHANGE UP (ref 8.4–10.5)
CALCIUM SERPL-MCNC: 8.5 MG/DL — SIGNIFICANT CHANGE UP (ref 8.4–10.5)
CHLORIDE SERPL-SCNC: 107 MMOL/L — SIGNIFICANT CHANGE UP (ref 96–108)
CHLORIDE SERPL-SCNC: 107 MMOL/L — SIGNIFICANT CHANGE UP (ref 96–108)
CO2 SERPL-SCNC: 27 MMOL/L — SIGNIFICANT CHANGE UP (ref 22–31)
CO2 SERPL-SCNC: 27 MMOL/L — SIGNIFICANT CHANGE UP (ref 22–31)
CREAT SERPL-MCNC: 0.5 MG/DL — SIGNIFICANT CHANGE UP (ref 0.5–1.3)
CREAT SERPL-MCNC: 0.5 MG/DL — SIGNIFICANT CHANGE UP (ref 0.5–1.3)
EGFR: 103 ML/MIN/1.73M2 — SIGNIFICANT CHANGE UP
EGFR: 103 ML/MIN/1.73M2 — SIGNIFICANT CHANGE UP
EOSINOPHIL # BLD AUTO: 0.2 K/UL — SIGNIFICANT CHANGE UP (ref 0–0.5)
EOSINOPHIL # BLD AUTO: 0.2 K/UL — SIGNIFICANT CHANGE UP (ref 0–0.5)
EOSINOPHIL NFR BLD AUTO: 3.5 % — SIGNIFICANT CHANGE UP (ref 0–6)
EOSINOPHIL NFR BLD AUTO: 3.5 % — SIGNIFICANT CHANGE UP (ref 0–6)
GLUCOSE SERPL-MCNC: 89 MG/DL — SIGNIFICANT CHANGE UP (ref 70–99)
GLUCOSE SERPL-MCNC: 89 MG/DL — SIGNIFICANT CHANGE UP (ref 70–99)
HCT VFR BLD CALC: 29.9 % — LOW (ref 34.5–45)
HCT VFR BLD CALC: 29.9 % — LOW (ref 34.5–45)
HCV AB S/CO SERPL IA: 0.27 S/CO — SIGNIFICANT CHANGE UP (ref 0–0.99)
HCV AB S/CO SERPL IA: 0.27 S/CO — SIGNIFICANT CHANGE UP (ref 0–0.99)
HCV AB SERPL-IMP: SIGNIFICANT CHANGE UP
HCV AB SERPL-IMP: SIGNIFICANT CHANGE UP
HGB BLD-MCNC: 9.9 G/DL — LOW (ref 11.5–15.5)
HGB BLD-MCNC: 9.9 G/DL — LOW (ref 11.5–15.5)
IMM GRANULOCYTES NFR BLD AUTO: 0.4 % — SIGNIFICANT CHANGE UP (ref 0–0.9)
IMM GRANULOCYTES NFR BLD AUTO: 0.4 % — SIGNIFICANT CHANGE UP (ref 0–0.9)
LACTATE SERPL-SCNC: 1.4 MMOL/L — SIGNIFICANT CHANGE UP (ref 0.7–2)
LACTATE SERPL-SCNC: 1.4 MMOL/L — SIGNIFICANT CHANGE UP (ref 0.7–2)
LYMPHOCYTES # BLD AUTO: 1.59 K/UL — SIGNIFICANT CHANGE UP (ref 1–3.3)
LYMPHOCYTES # BLD AUTO: 1.59 K/UL — SIGNIFICANT CHANGE UP (ref 1–3.3)
LYMPHOCYTES # BLD AUTO: 28.1 % — SIGNIFICANT CHANGE UP (ref 13–44)
LYMPHOCYTES # BLD AUTO: 28.1 % — SIGNIFICANT CHANGE UP (ref 13–44)
MAGNESIUM SERPL-MCNC: 2.2 MG/DL — SIGNIFICANT CHANGE UP (ref 1.6–2.6)
MAGNESIUM SERPL-MCNC: 2.2 MG/DL — SIGNIFICANT CHANGE UP (ref 1.6–2.6)
MCHC RBC-ENTMCNC: 31.7 PG — SIGNIFICANT CHANGE UP (ref 27–34)
MCHC RBC-ENTMCNC: 31.7 PG — SIGNIFICANT CHANGE UP (ref 27–34)
MCHC RBC-ENTMCNC: 33.1 GM/DL — SIGNIFICANT CHANGE UP (ref 32–36)
MCHC RBC-ENTMCNC: 33.1 GM/DL — SIGNIFICANT CHANGE UP (ref 32–36)
MCV RBC AUTO: 95.8 FL — SIGNIFICANT CHANGE UP (ref 80–100)
MCV RBC AUTO: 95.8 FL — SIGNIFICANT CHANGE UP (ref 80–100)
MONOCYTES # BLD AUTO: 0.74 K/UL — SIGNIFICANT CHANGE UP (ref 0–0.9)
MONOCYTES # BLD AUTO: 0.74 K/UL — SIGNIFICANT CHANGE UP (ref 0–0.9)
MONOCYTES NFR BLD AUTO: 13.1 % — SIGNIFICANT CHANGE UP (ref 2–14)
MONOCYTES NFR BLD AUTO: 13.1 % — SIGNIFICANT CHANGE UP (ref 2–14)
NEUTROPHILS # BLD AUTO: 3.07 K/UL — SIGNIFICANT CHANGE UP (ref 1.8–7.4)
NEUTROPHILS # BLD AUTO: 3.07 K/UL — SIGNIFICANT CHANGE UP (ref 1.8–7.4)
NEUTROPHILS NFR BLD AUTO: 54.4 % — SIGNIFICANT CHANGE UP (ref 43–77)
NEUTROPHILS NFR BLD AUTO: 54.4 % — SIGNIFICANT CHANGE UP (ref 43–77)
NRBC # BLD: 0 /100 WBCS — SIGNIFICANT CHANGE UP (ref 0–0)
NRBC # BLD: 0 /100 WBCS — SIGNIFICANT CHANGE UP (ref 0–0)
PHOSPHATE SERPL-MCNC: 3.5 MG/DL — SIGNIFICANT CHANGE UP (ref 2.5–4.5)
PHOSPHATE SERPL-MCNC: 3.5 MG/DL — SIGNIFICANT CHANGE UP (ref 2.5–4.5)
PLATELET # BLD AUTO: 162 K/UL — SIGNIFICANT CHANGE UP (ref 150–400)
PLATELET # BLD AUTO: 162 K/UL — SIGNIFICANT CHANGE UP (ref 150–400)
POTASSIUM SERPL-MCNC: 3.8 MMOL/L — SIGNIFICANT CHANGE UP (ref 3.5–5.3)
POTASSIUM SERPL-MCNC: 3.8 MMOL/L — SIGNIFICANT CHANGE UP (ref 3.5–5.3)
POTASSIUM SERPL-SCNC: 3.8 MMOL/L — SIGNIFICANT CHANGE UP (ref 3.5–5.3)
POTASSIUM SERPL-SCNC: 3.8 MMOL/L — SIGNIFICANT CHANGE UP (ref 3.5–5.3)
PROT SERPL-MCNC: 6.5 G/DL — SIGNIFICANT CHANGE UP (ref 6–8.3)
PROT SERPL-MCNC: 6.5 G/DL — SIGNIFICANT CHANGE UP (ref 6–8.3)
RBC # BLD: 3.12 M/UL — LOW (ref 3.8–5.2)
RBC # BLD: 3.12 M/UL — LOW (ref 3.8–5.2)
RBC # FLD: 12.5 % — SIGNIFICANT CHANGE UP (ref 10.3–14.5)
RBC # FLD: 12.5 % — SIGNIFICANT CHANGE UP (ref 10.3–14.5)
SODIUM SERPL-SCNC: 138 MMOL/L — SIGNIFICANT CHANGE UP (ref 135–145)
SODIUM SERPL-SCNC: 138 MMOL/L — SIGNIFICANT CHANGE UP (ref 135–145)
WBC # BLD: 5.65 K/UL — SIGNIFICANT CHANGE UP (ref 3.8–10.5)
WBC # BLD: 5.65 K/UL — SIGNIFICANT CHANGE UP (ref 3.8–10.5)
WBC # FLD AUTO: 5.65 K/UL — SIGNIFICANT CHANGE UP (ref 3.8–10.5)
WBC # FLD AUTO: 5.65 K/UL — SIGNIFICANT CHANGE UP (ref 3.8–10.5)

## 2023-12-19 PROCEDURE — 99232 SBSQ HOSP IP/OBS MODERATE 35: CPT

## 2023-12-19 RX ORDER — CLONAZEPAM 1 MG
0.5 TABLET ORAL DAILY
Refills: 0 | Status: DISCONTINUED | OUTPATIENT
Start: 2023-12-19 | End: 2023-12-22

## 2023-12-19 RX ORDER — LIDOCAINE 4 G/100G
1 CREAM TOPICAL DAILY
Refills: 0 | Status: DISCONTINUED | OUTPATIENT
Start: 2023-12-19 | End: 2023-12-22

## 2023-12-19 RX ORDER — GABAPENTIN 400 MG/1
100 CAPSULE ORAL EVERY 8 HOURS
Refills: 0 | Status: DISCONTINUED | OUTPATIENT
Start: 2023-12-19 | End: 2023-12-19

## 2023-12-19 RX ORDER — SIMETHICONE 80 MG/1
80 TABLET, CHEWABLE ORAL
Refills: 0 | Status: DISCONTINUED | OUTPATIENT
Start: 2023-12-19 | End: 2023-12-22

## 2023-12-19 RX ORDER — SODIUM CHLORIDE 9 MG/ML
500 INJECTION INTRAMUSCULAR; INTRAVENOUS; SUBCUTANEOUS ONCE
Refills: 0 | Status: COMPLETED | OUTPATIENT
Start: 2023-12-19 | End: 2023-12-19

## 2023-12-19 RX ORDER — ACETAMINOPHEN 500 MG
600 TABLET ORAL ONCE
Refills: 0 | Status: COMPLETED | OUTPATIENT
Start: 2023-12-19 | End: 2023-12-19

## 2023-12-19 RX ADMIN — Medication 600 MILLIGRAM(S): at 18:33

## 2023-12-19 RX ADMIN — Medication 15 MILLIGRAM(S): at 12:00

## 2023-12-19 RX ADMIN — CYCLOBENZAPRINE HYDROCHLORIDE 5 MILLIGRAM(S): 10 TABLET, FILM COATED ORAL at 05:12

## 2023-12-19 RX ADMIN — CYCLOBENZAPRINE HYDROCHLORIDE 5 MILLIGRAM(S): 10 TABLET, FILM COATED ORAL at 15:21

## 2023-12-19 RX ADMIN — ENOXAPARIN SODIUM 30 MILLIGRAM(S): 100 INJECTION SUBCUTANEOUS at 11:40

## 2023-12-19 RX ADMIN — Medication 15 MILLIGRAM(S): at 11:27

## 2023-12-19 RX ADMIN — SODIUM CHLORIDE 1000 MILLILITER(S): 9 INJECTION INTRAMUSCULAR; INTRAVENOUS; SUBCUTANEOUS at 23:23

## 2023-12-19 RX ADMIN — Medication 240 MILLIGRAM(S): at 17:05

## 2023-12-19 RX ADMIN — Medication 0.5 MILLIGRAM(S): at 15:38

## 2023-12-19 RX ADMIN — VALSARTAN 40 MILLIGRAM(S): 80 TABLET ORAL at 05:11

## 2023-12-19 RX ADMIN — SIMETHICONE 80 MILLIGRAM(S): 80 TABLET, CHEWABLE ORAL at 16:07

## 2023-12-19 NOTE — INPATIENT CERTIFICATION FOR MEDICARE PATIENTS - IN ORDER TO MEET MEDICARE REQUIREMENTS.
"12/1/2020       RE: Colletta Dwen Sorrell  [address]  [facility] [address]     Dear Colleague,    Thank you for referring your patient, Colletta Dwen Sorrell, to the [facility] [place] [facility]. Please see a copy of my visit note below.    Colletta Dwen Sorrell is a 62 year old female who is being evaluated via a billable telephone visit.      The patient has been notified of following:     \"This telephone visit will be conducted via a call between you and your physician/provider. We have found that certain health care needs can be provided without the need for a physical exam.  This service lets us provide the care you need with a short phone conversation.  If a prescription is necessary we can send it directly to your pharmacy.  If lab work is needed we can place an order for that and you can then stop by our lab to have the test done at a later time.    Telephone visits are billed at different rates depending on your insurance coverage. During this emergency period, for some insurers they may be billed the same as an in-person visit.  Please reach out to your insurance provider with any questions.    If during the course of the call the physician/provider feels a telephone visit is not appropriate, you will not be charged for this service.\"    Patient has given verbal consent for Telephone visit?  Yes    What phone number would you like to be contacted at? [phone]    How would you like to obtain your AVS? Mail a copy    Notes:  Colletta Sorrell is a 62 year old woman who I follow for primary progressive MS.  This was a phone visit for routine followup.    Colletta tells me she has been doing well, with no new neurologic symptoms.  She feels her walking is the same as it was one year ago.  She uses a cane or a walker at times, has not had any falls.  Her legs get tight, she has been seeing a chiropractor for \"nerve " "decompression\", which she thinks is helpful. Her left side is more affected by MS.  The arms and bladder are doing fine (on oxybutynin), she has moderate fatigue.  She takes a lot of supplements, including vitamins, alphalipoic acid, brandon tea.  Her last ocerlizumab infusion was in July, and she is getting it every 11 months.  She has not had any significant new medical problems since I saw her last.    Past Medical History:   Diagnosis Date     Cubital tunnel syndrome      Meralgia paraesthetica      MS (multiple sclerosis) (H)      Multinodular thyroid      Neuropathy        Current Outpatient Medications:      Alpha-Lipoic Acid 300 MG CAPS, TAKE ONE CAPSULE BY MOUTH EVERY DAY, Disp: 30 capsule, Rfl: 11     Ascorbic Acid (VITAMIN C PO), Take 1.5 teaspoonful by mouth daily , Disp: , Rfl:      B Complex Vitamins (VITAMIN B COMPLEX PO), Take 1 tablet by mouth as needed , Disp: , Rfl:      CALCIUM-VITAMIN D PO, Take 1 tablet by mouth daily Vit d 7,0000 unit, Disp: , Rfl:      cholecalciferol (VITAMIN D3) 5000 units (125 mcg) capsule, TAKE ONE CAPSULE BY MOUTH EVERY DAY, Disp: 30 capsule, Rfl: 11     Cyanocobalamin (VITAMIN B 12 PO), Take 2,500 mcg by mouth daily, Disp: , Rfl:      Green Tea, Radha sinensis, (GREEN TEA PO), Liquid form, Disp: , Rfl:      HEMP OIL OR EXTRACT OR OTHER CBD CANNABINOID, NOT MEDICAL CANNABIS,, , Disp: , Rfl:      Incontinence Supply Disposable (DEPEND PANT SM/MED) MISC, USE 1-2 PER DAY, Disp: 36 each, Rfl: 3     MAGNESIUM OXIDE PO, Take 500 mg by mouth daily , Disp: , Rfl:      melatonin 3 MG tablet, Take 3 mg by mouth nightly as needed , Disp: , Rfl:      Multiple Vitamins-Minerals (MULTIVITAMIN ADULT) TABS, Take 1 tablet by mouth daily, Disp: 30 tablet, Rfl: 11     Naltrexone POWD, Take 4.5 mg by mouth At Bedtime, Disp: 1 Bottle, Rfl: 3     Ocrelizumab (OCREVUS IV), , Disp: , Rfl:      Omega-3 Fatty Acids (FISH OIL) 1200 MG capsule, Take 1,200 mg by mouth daily, Disp: , Rfl:      " "oxybutynin ER (DITROPAN XL) 15 MG 24 hr tablet, TAKE ONE TABLET BY MOUTH ONCE DAILY, Disp: 30 tablet, Rfl: 11     Probiotic Product (ACIDOPHILUS PROBIOTIC BLEND) CAPS, TAKE ONE CAPSULE BY MOUTH EVERY DAY, Disp: 30 capsule, Rfl: 11     ranitidine (ZANTAC) 150 MG tablet, Take 1 tablet (150 mg) by mouth 2 times daily, Disp: 60 tablet, Rfl: 3     TURMERIC PO, , Disp: , Rfl:      Vitamin A 09881 units CAPS, , Disp: , Rfl:     Imp:  PPMS, stable on oxybutynin.  We discussed Covid-19 in the context of MS and B cell depletion - I told her that it may impair the effectiveness of vaccines, including Covid-19 vaccine, but that I would recommend she get it anyway.  She says she probably is not interested in that.  She asked about a new MS pill that she read about that \"heals the prior damage\" - I told her no such thing exists.    Plan:  1.  Next ocrevus in June 2021  2.  Follow-up in 6 months with [facility]    Phone call duration: 20 minutes    Barrera Shannon MD        " In order to meet Medicare requirements, the clinical documentation must support the information cited in the admission order.  Please be sure to provide detailed and clear documentation about the following in the admitting note/history and physical:

## 2023-12-19 NOTE — CONSULT NOTE ADULT - ASSESSMENT
Confidential Drug Utilization Report  Search Terms: ROULA PASTOR, 1955 Search Date: 12/19/2023 13:53:51 PM  The Drug Utilization Report below displays all of the controlled substance prescriptions, if any, that your patient has filled in the last twelve months. The information displayed on this report is compiled from pharmacy submissions to the Department, and accurately reflects the information as submitted by the pharmacies.    This report was requested by: Bonita Christiansen | Reference #: [id number]    You have not added a KWABENA number. Keeping your KWABENA number(s) up to date on the My KWABENA # tab will enable the separation of your prescriptions from others in the search results.    Practitioner Count: 2  Pharmacy Count: 1  Current Opioid Prescriptions: 1  Current Benzodiazepine Prescriptions: 1  Current Stimulant Prescriptions: 0      Patient Demographic Information (PDI)       PDI	First Name	Last Name	Birth Date	Gender	Street Address	City	State	Zip Code  MACARIO Lopez	1955	Female	[address]	[facility]	[id number]    Prescription Information      PDI Filter:    PDI	Current Rx	Drug Type	Rx Written	Rx Dispensed	Drug	Quantity	Days Supply	Prescriber Name	Prescriber KWABENA #	Payment Method	Dispenser  A	Y	B	12/06/2023	12/08/2023	clonazepam 1 mg tablet	60	30	Gina Dale	[id number]	[facility]  A	Y	O	12/07/2023	12/08/2023	oxycodone-acetaminophen 5-325 mg tablet	90	30	FenMilagros maddox MD	[id number]	[facility]  A	N	O	11/09/2023	11/09/2023	oxycodone-acetaminophen 5-325 mg tab	90	30	Milagros Ryder MD	[id number]	[facility]  A	N	B	11/06/2023	11/06/2023	clonazepam 1 mg tablet	60	30	Gina Dale	[id number]	[facility]  A	N	O	10/12/2023	10/13/2023	oxycodone-acetaminophen 5-325 mg tab	90	30	Milagros Ryder MD	[id number]	[facility]  A	N	O	09/14/2023	09/14/2023	oxycodone-acetaminophen 5-325 mg tab	120	30	Milagros Ryder MD	[id number]	[facility]  A	N	B	09/10/2023	09/12/2023	clonazepam 1 mg tablet	60	30	Dale, Gina	[id number]	[facility]  A	N	O	08/10/2023	08/10/2023	oxycodone hcl (ir) 5 mg tablet	135	30	Milagros Ryder MD	[id number]	[facility]  A	N	B	07/26/2023	07/28/2023	clonazepam 1 mg tablet	60	30	Dale, Gina	[id number]	[facility]  A	N	O	07/13/2023	07/13/2023	oxycodone hcl (ir) 10 mg tab	90	30	Milagros Ryder MD	[id number]	[facility]  A	N	O	06/27/2023	06/27/2023	oxycodone-acetaminophen  mg tab	77	19	George Gutierrez MD	[id number]	[facility]  A	N	O	06/13/2023	06/13/2023	oxycodone-acetaminophen  mg tab	55	13	George Gutierrez MD	[id number]	[facility]  A	N	B	05/24/2023	05/30/2023	clonazepam 1 mg tablet	60	30	Dale, Gina	[id number]	[facility]  A	N	O	05/23/2023	05/23/2023	oxycodone-acetaminophen  mg tablet	77	19	George Gutierrez MD	[id number]	[facility]  A	N	B	04/26/2023	04/27/2023	clonazepam 1 mg tablet	60	30	Dale, Gina	[id number]	[facility]  A	N	O	04/25/2023	04/25/2023	oxycodone-acetaminophen  mg tab	110	28	George Gutierrez MD	[id number]	[facility]  A	N	O	04/11/2023	04/11/2023	oxycodone-acetaminophen  mg tab	55	14	George Gutierrez MD	[id number]	[facility]  A	N	O	03/28/2023	03/28/2023	oxycodone-acetaminophen  mg tab	55	14	George Gutierrez MD	[id number]	[facility]  A	N	B	03/22/2023	03/22/2023	clonazepam 1 mg tablet	60	30	Dale, Gina	[id number]	[facility]  A	N	O	03/14/2023	03/14/2023	oxycodone-acetaminophen  mg tab	55	14	George Gutierrez MD	[id number]	[facility]  A	N	O	02/28/2023	02/28/2023	oxycodone-acetaminophen  mg tab	55	14	BrendaGeorge iraheta MD	[id number]	[facility]  A	N	O	02/14/2023	02/14/2023	oxycodone-acetaminophen  mg tab	55	14	BrendaGeorge iraheta MD	[id number]	[facility]  A	N	B	02/07/2023	02/07/2023	clonazepam 1 mg tablet	60	30	Dale, Gina	[id number]	[facility]  A	N	O	01/31/2023	01/31/2023	oxycodone-acetaminophen  mg tab	55	14	BrendaGeorge kendall MD	[id number]	[facility]  A	N	O	01/17/2023	01/17/2023	oxycodone-acetaminophen  mg tab	55	14	BrendaGeorge iraheta MD	[id number]	[facility]  A	N	O	01/03/2023	01/03/2023	oxycodone-acetaminophen  mg tab	55	14	BrendaGeorge iraheta MD	[id number]	[facility]  A	N	B	12/28/2022	12/30/2022	clonazepam 1 mg tablet	60	30	Dale, Gina	[id number]	[facility]  A	N	O	12/20/2022	12/20/2022	oxycodone-acetaminophen  mg tab	55	14	George Gutierrez MD	[id number]	[facility]    * - Details of Drug Type : O = Opioid, B = Benzodiazepine, S = Stimulant    * - Drugs marked with an asterisk are compound drugs. If the compound drug is made up of more than one controlled substance, then each controlled substance will be a separate row in the table.     Confidential Drug Utilization Report  Search Terms: ROULA PASTOR, 1955 Search Date: 12/19/2023 13:53:51 PM  The Drug Utilization Report below displays all of the controlled substance prescriptions, if any, that your patient has filled in the last twelve months. The information displayed on this report is compiled from pharmacy submissions to the Department, and accurately reflects the information as submitted by the pharmacies.    This report was requested by: Bonita Christiansen | Reference #: [id number]    You have not added a KWABENA number. Keeping your KWABENA number(s) up to date on the My KWABENA # tab will enable the separation of your prescriptions from others in the search results.    Practitioner Count: 2  Pharmacy Count: 1  Current Opioid Prescriptions: 1  Current Benzodiazepine Prescriptions: 1  Current Stimulant Prescriptions: 0      Patient Demographic Information (PDI)       PDI	First Name	Last Name	Birth Date	Gender	Street Address	City	State	Zip Code  MACARIO Lopez	1955	Female	[address]	[facility]	[address]    Prescription Information      PDI Filter:    PDI	Current Rx	Drug Type	Rx Written	Rx Dispensed	Drug	Quantity	Days Supply	Prescriber Name	Prescriber KWABENA #	Payment Method	Dispenser  A	Y	B	12/06/2023	12/08/2023	clonazepam 1 mg tablet	60	30	Gina Dale	[id number]	[facility]  A	Y	O	12/07/2023	12/08/2023	oxycodone-acetaminophen 5-325 mg tablet	90	30	FenMilagros maddox MD	[id number]	[facility]  A	N	O	11/09/2023	11/09/2023	oxycodone-acetaminophen 5-325 mg tab	90	30	Milagros Ryder MD	[id number]	[facility]  A	N	B	11/06/2023	11/06/2023	clonazepam 1 mg tablet	60	30	Gina Dale	[id number]	[facility]  A	N	O	10/12/2023	10/13/2023	oxycodone-acetaminophen 5-325 mg tab	90	30	Milagros Ryder MD	[id number]	[facility]  A	N	O	09/14/2023	09/14/2023	oxycodone-acetaminophen 5-325 mg tab	120	30	Milagros Ryder MD	[id number]	[facility]  A	N	B	09/10/2023	09/12/2023	clonazepam 1 mg tablet	60	30	Dale, Gina	[id number]	[facility]  A	N	O	08/10/2023	08/10/2023	oxycodone hcl (ir) 5 mg tablet	135	30	Milagros Ryder MD	[id number]	[facility]  A	N	B	07/26/2023	07/28/2023	clonazepam 1 mg tablet	60	30	Dale, Gina	[id number]	[facility]  A	N	O	07/13/2023	07/13/2023	oxycodone hcl (ir) 10 mg tab	90	30	Milagros Ryder MD	[id number]	[facility]  A	N	O	06/27/2023	06/27/2023	oxycodone-acetaminophen  mg tab	77	19	George Gutierrez MD	[id number]	[facility]  A	N	O	06/13/2023	06/13/2023	oxycodone-acetaminophen  mg tab	55	13	George Gutierrez MD	[id number]	[facility]  A	N	B	05/24/2023	05/30/2023	clonazepam 1 mg tablet	60	30	Dale, Gina	[id number]	[facility]  A	N	O	05/23/2023	05/23/2023	oxycodone-acetaminophen  mg tablet	77	19	George Gutierrez MD	[id number]	[facility]  A	N	B	04/26/2023	04/27/2023	clonazepam 1 mg tablet	60	30	Dale, Gina	[id number]	[facility]  A	N	O	04/25/2023	04/25/2023	oxycodone-acetaminophen  mg tab	110	28	George Gutierrez MD	[id number]	[facility]  A	N	O	04/11/2023	04/11/2023	oxycodone-acetaminophen  mg tab	55	14	George Gutierrez MD	[id number]	[facility]  A	N	O	03/28/2023	03/28/2023	oxycodone-acetaminophen  mg tab	55	14	George Gutierrez MD	[id number]	[facility]  A	N	B	03/22/2023	03/22/2023	clonazepam 1 mg tablet	60	30	Dale, Gina	[id number]	[facility]  A	N	O	03/14/2023	03/14/2023	oxycodone-acetaminophen  mg tab	55	14	George Gutierrez MD	[id number]	[facility]  A	N	O	02/28/2023	02/28/2023	oxycodone-acetaminophen  mg tab	55	14	BrendaGeorge iraheta MD	[id number]	[facility]  A	N	O	02/14/2023	02/14/2023	oxycodone-acetaminophen  mg tab	55	14	BrendaGeorge iraheta MD	[id number]	[facility]  A	N	B	02/07/2023	02/07/2023	clonazepam 1 mg tablet	60	30	Dale, Gina	[id number]	[facility]  A	N	O	01/31/2023	01/31/2023	oxycodone-acetaminophen  mg tab	55	14	BrendaGeorge kendall MD	[id number]	[facility]  A	N	O	01/17/2023	01/17/2023	oxycodone-acetaminophen  mg tab	55	14	BrendaGeorge iraheta MD	[id number]	[facility]  A	N	O	01/03/2023	01/03/2023	oxycodone-acetaminophen  mg tab	55	14	BrendaGeorge iraheta MD	[id number]	[facility]  A	N	B	12/28/2022	12/30/2022	clonazepam 1 mg tablet	60	30	Dale, Gina	[id number]	[facility]  A	N	O	12/20/2022	12/20/2022	oxycodone-acetaminophen  mg tab	55	14	George Gutierrez MD	[id number]	[facility]    * - Details of Drug Type : O = Opioid, B = Benzodiazepine, S = Stimulant    * - Drugs marked with an asterisk are compound drugs. If the compound drug is made up of more than one controlled substance, then each controlled substance will be a separate row in the table.

## 2023-12-19 NOTE — CONSULT NOTE ADULT - PROBLEM SELECTOR RECOMMENDATION 9
Pt with left hip/pelvic pain which is somatic and neuropathic in nature due to acute comminuted and mildly displaced fracture of the left superior pubic ramus extending into the left pubic symphysis and left inferior pubic ramus. Acute comminuted and nondisplaced left sacral ala fracture s/p fall. Pt with history of osteoporosis. Pt was evaluated by NSG and Orthopedic surgery with the recommendation for no surgical intervention and WBAT to BLE's with appropriate assistive device.  Opioid pain recommendations   - Continue Percocet 5/325 mg PO q 4 hours PRN severe pain. Monitor for sedation/ respiratory depression.   - Continue Cyclobenzaprine 5 mg po TID for 3 days (12/21) - monitor for somnolence/drowsy  Non-opioid pain recommendations   - Gabapentin 100mg po q 8 hours. Monitor renal function.   - Lidoderm 4% patch daily.   Bowel Regimen  - Miralax 17G PO daily  - Senna 2 tablets at bedtime for constipation  Mild pain 1 - 3  - Non-pharmacological pain treatment recommendations  - Warm/ Cool packs PRN   - Repositioning extremity, elevation, imagery, relaxation, distraction.  - Physical therapy OOB if no contraindications   Recommendations discussed with primary team and RN

## 2023-12-19 NOTE — CONSULT NOTE ADULT - SUBJECTIVE AND OBJECTIVE BOX
Source of information: ROULA PASTOR, Chart review  Patient language: English  : n/a    HPI:  68 yrs old F, from home, ambulating independently, pmhx HTN, anxiety, pshx appendectomy, presented with back and hip pain s/p mechanical fall. Pt stated that she is unsure of how she has fallen down, did not trip but likely when turning lost balance and hit the wall or the floor. Pt denied chest pain, palpitation, dyspnea, dizziness, visual changes, abdominal pain, N/V/D, dysuria, fever, chills, unilateral weakness or decreased sensation however has been generally weak lately s/p appendectomy and was on the physical therapy at home for regaining her strength. Pt denied saddle anesthesia, urinary or bladder incontinence however endorsed inability to urinate despite the urge.   Pt denied alcohol consumption, smoking or use of illicit drugs. She endorsed getting Morphine after the surgery which was prescribed however no longer taking.  (18 Dec 2023 08:53)      Patient is a 68y old  Female who presents with a chief complaint of pubic and sacral fracture (19 Dec 2023 11:47)  . Pt is admitted for ..., being treated with .... Pain consulted for .... Pt seen and examined at bedside. Reports pain score ***  SCALE USED: (1-10 VNRS). Pt describes pain as .... radiating to... alleviated by pain medication... exacerbated by movement... Pt tolerating PO diet. Denies lethargy, chest pain, SOB, nausea, vomiting, constipation. Reports last BM ***. Patient stated goal for pain control: to be able to take deep breaths, get out of bed to chair and ambulate with tolerable pain control. Pt denies taking medications for pain at home.     PAST MEDICAL & SURGICAL HISTORY:  Cervical disc disorder at C6-C7 level with radiculopathy  with ulnar nerve compression      Anxiety      Carpal tunnel syndrome of left wrist      Breast mass, left      Uterine fibroid      Cataract  bilateral eyes      HPV in female      Fibrocystic breast disease      Subclavian arterial stenosis  left side 2018      Foot fracture, left  2016, s/p pins/screws      Osteoporosis      Chronic pain      History of spinal fusion  cervical C5-C7 with metal hardware-2012 ( discectomy)      S/P carpal tunnel release  left-2015      S/P decompression of ulnar nerve at elbow  left -2015 ( done with carpal tunnel release)      S/P lumpectomy, left breast  2010      S/P hysterectomy  2008      H/O bilateral breast reduction surgery  2002      S/P cataract surgery, right  with artificial lenses- November 2016      S/P tonsillectomy      S/P cataract surgery, left      S/P ORIF (open reduction internal fixation) fracture  left foot          FAMILY HISTORY:  Family history of non-Hodgkin's lymphoma (Mother)    Acute myocardial infarction (Father)        Social History:   [ ] Denies ETOH use, illicit drug use and smoking    Allergies    Avelox (Other)  NSAIDs (Other; Stomach Upset)  penicillin (Other)    Intolerances        MEDICATIONS  (STANDING):  clonazePAM  Tablet 1 milliGRAM(s) Oral at bedtime  cyclobenzaprine 5 milliGRAM(s) Oral three times a day  enoxaparin Injectable 30 milliGRAM(s) SubCutaneous every 24 hours  influenza  Vaccine (HIGH DOSE) 0.7 milliLiter(s) IntraMuscular once  oxycodone    5 mG/acetaminophen 325 mG 1 Tablet(s) Oral every 6 hours  valsartan 40 milliGRAM(s) Oral daily    MEDICATIONS  (PRN):  acetaminophen     Tablet .. 650 milliGRAM(s) Oral every 6 hours PRN Temp greater or equal to 38C (100.4F), Mild Pain (1 - 3)  clonazePAM  Tablet 1 milliGRAM(s) Oral daily PRN anxiety  ketorolac   Injectable 15 milliGRAM(s) IV Push every 6 hours PRN Moderate Pain (4 - 6)  morphine  - Injectable 2 milliGRAM(s) IV Push every 6 hours PRN Severe Pain (7 - 10)  polyethylene glycol 3350 17 Gram(s) Oral daily PRN Constipation  senna 2 Tablet(s) Oral at bedtime PRN Constipation      Vital Signs Last 24 Hrs  T(C): 36.2 (19 Dec 2023 04:40), Max: 36.7 (18 Dec 2023 20:20)  T(F): 97.2 (19 Dec 2023 04:40), Max: 98 (18 Dec 2023 20:20)  HR: 82 (19 Dec 2023 04:40) (82 - 98)  BP: 150/87 (19 Dec 2023 04:40) (92/60 - 150/87)  BP(mean): 108 (19 Dec 2023 04:40) (70 - 108)  RR: 17 (19 Dec 2023 04:40) (17 - 18)  SpO2: 97% (19 Dec 2023 04:40) (97% - 98%)    Parameters below as of 19 Dec 2023 04:40  Patient On (Oxygen Delivery Method): room air        LABS: Reviewed.                          9.9    5.65  )-----------( 162      ( 19 Dec 2023 07:30 )             29.9     12-19    138  |  107  |  9   ----------------------------<  89  3.8   |  27  |  0.50    Ca    8.5      19 Dec 2023 07:30  Phos  3.5     12-19  Mg     2.2     12-19    TPro  6.5  /  Alb  3.0<L>  /  TBili  0.6  /  DBili  x   /  AST  20  /  ALT  24  /  AlkPhos  57  12-19    PT/INR - ( 18 Dec 2023 07:07 )   PT: 11.6 sec;   INR: 1.02 ratio         PTT - ( 18 Dec 2023 07:07 )  PTT:28.0 sec  LIVER FUNCTIONS - ( 19 Dec 2023 07:30 )  Alb: 3.0 g/dL / Pro: 6.5 g/dL / ALK PHOS: 57 U/L / ALT: 24 U/L DA / AST: 20 U/L / GGT: x           Urinalysis Basic - ( 19 Dec 2023 07:30 )    Color: x / Appearance: x / SG: x / pH: x  Gluc: 89 mg/dL / Ketone: x  / Bili: x / Urobili: x   Blood: x / Protein: x / Nitrite: x   Leuk Esterase: x / RBC: x / WBC x   Sq Epi: x / Non Sq Epi: x / Bacteria: x      CAPILLARY BLOOD GLUCOSE            Radiology: Reviewed.     ORT Score -   Family Hx of substance abuse	Female	      Male  Alcohol 	                                           1                     3  Illegal drugs	                                   2                     3  Rx drugs                                           4 	                  4  Personal Hx of substance abuse		  Alcohol 	                                          3	                  3  Illegal drugs                                     4	                  4  Rx drugs                                            5 	                  5  Age between 16- 45 years	           1                     1  hx preadolescent sexual abuse	   3 	                  0  Psychological disease		  ADD, OCD, bipolar, schizophrenia   2	          2  Depression                                           1 	          1  Total: 0    a score of 3 or lower indicates low risk for opioid abuse		  a score of 4-7 indicates moderate risk for opioid abuse		  a score of 8 or higher indicates high risk for opioid abuse  	  4AT (Assessment test for delirium & cognitive impairment)  _________________________________________________________  [1] ALERTNESS  This includes patients who may be markedly drowsy (eg. difficult to rouse and/or obviously sleepy  during assessment) or agitated/hyperactive. Observe the patient. If asleep, attempt to wake with  speech or gentle touch on shoulder. Ask the patient to state their name and address to assist rating.  Normal (fully alert, but not agitated, throughout assessment) 0  Mild sleepiness for <10 seconds after waking, then normal 0  Clearly abnormal 4    [2] AMT4  Age, date of birth, place (name of the hospital or building), current year.  No mistakes 0  1 mistake 1  2 or more mistakes/untestable 2    [3] ATTENTION  Ask the patient: “Please tell me the months of the year in backwards order, starting at December.”  To assist initial understanding one prompt of “what is the month before December?” is permitted.  Months of the year backwards Achieves 7 months or more correctly 0  Starts but scores <7 months / refuses to start 1   Untestable (cannot start because unwell, drowsy, inattentive) 2    [4] ACUTE CHANGE OR FLUCTUATING COURSE  Evidence of significant change or fluctuation in: alertness, cognition, other mental function  (eg. paranoia, hallucinations) arising over the last 2 weeks and still evident in last 24hrs  No 0  Yes 4    4 or above: possible delirium +/- cognitive impairment  1-3: possible cognitive impairment  0: delirium or severe cognitive impairment unlikely (but delirium still possible if [4] information incomplete)    4AT SCORE: 0    REVIEW OF SYSTEMS:  CONSTITUTIONAL: No fever or fatigue  HEENT:  No difficulty hearing, no change in vision  NECK: No pain or stiffness  RESPIRATORY: No cough, wheezing, chills or hemoptysis; No shortness of breath  CARDIOVASCULAR: No chest pain, palpitations, dizziness, or leg swelling  GASTROINTESTINAL: No loss of appetite, decreased PO intake. No abdominal or epigastric pain. No nausea, vomiting; No diarrhea or constipation.   GENITOURINARY: No dysuria, frequency, hematuria, retention or incontinence  MUSCULOSKELETAL: No joint pain or swelling; No muscle, back, or extremity pain, no upper or lower motor strength weakness, no saddle anesthesia, bowel/bladder incontinence, no falls   NEURO: No headaches, No numbness/tingling b/l LE, No weakness  ENDOCRINE: No polyuria, polydipsia, heat or cold intolerance; No hair loss  PSYCHIATRIC: No depression, anxiety or difficulty sleeping    PHYSICAL EXAM:  GENERAL:  Alert & Oriented X4, cooperative, NAD, Good concentration. Speech is clear.   RESPIRATORY: Respirations even and unlabored. Clear to auscultation bilaterally; No rales, rhonchi, wheezing, or rubs  CARDIOVASCULAR: Normal S1/S2, regular rate and rhythm; No murmurs, rubs, or gallops. No JVD.   GASTROINTESTINAL:  Soft, Nontender, Nondistended; Bowel sounds present  PERIPHERAL VASCULAR:  Extremities warm without edema. 2+ Peripheral Pulses, No cyanosis, No calf tenderness  MUSCULOSKELETAL: Motor Strength 5/5 B/L upper and lower extremities; moves all extremities equally against gravity; ROM intact; negative SLR; No tenderness on palpation of all joints.   SKIN: Warm, dry, intact. No rashes, lesions, scars or wounds.     Risk factors associated with adverse outcomes related to opioid treatment  [ ]  Concurrent benzodiazepine use  [ ]  History/ Active substance use or alcohol use disorder  [ ] Psychiatric co-morbidity  [ ] Sleep apnea  [ ] COPD  [ ] BMI> 35  [ ] Liver dysfunction  [ ] Renal dysfunction  [ ] CHF  [ ] Smoker  [ ]  Age > 60 years    [ ]  [place]  Reviewed and Copied to Chart. See below.    Plan of care and goal oriented pain management treatment options were discussed with patient and /or primary care giver; all questions and concerns were addressed and care was aligned with patient's wishes.    Educated patient on goal oriented pain management treatment options      Source of information: ROULA PASTOR, Chart review  Patient language: English  : n/a    HPI:  68 yrs old F, from home, ambulating independently, pmhx HTN, anxiety, pshx appendectomy, presented with back and hip pain s/p mechanical fall. Pt stated that she is unsure of how she has fallen down, did not trip but likely when turning lost balance and hit the wall or the floor. Pt denied chest pain, palpitation, dyspnea, dizziness, visual changes, abdominal pain, N/V/D, dysuria, fever, chills, unilateral weakness or decreased sensation however has been generally weak lately s/p appendectomy and was on the physical therapy at home for regaining her strength. Pt denied saddle anesthesia, urinary or bladder incontinence however endorsed inability to urinate despite the urge.   Pt denied alcohol consumption, smoking or use of illicit drugs. She endorsed getting Morphine after the surgery which was prescribed however no longer taking.  (18 Dec 2023 08:53)    Patient is a 68y old  Female who presents with a chief complaint of pubic and sacral fracture (19 Dec 2023 11:47)    CT pelvis 12/18 demonstrated acute comminuted and mildly displaced fracture of the left superior pubic ramus extending into the left pubic symphysis and left inferior pubic ramus.  Acute comminuted and nondisplaced left sacral ala fracture.    Pt is a 68 year old female admitted for public and sacral fracture after a fall sustained at home. Pain consulted on 12/19 for management of left hip/buttock pain.  Pt reports that she recently spent 6 weeks at [facility] for treatment of a ruptured appendix.  States that post hopitalization that she was receiving PT sessions at home to improve the strength in her LEs.  States that she was improving well however she "tripped over a cord while getting out of bed".  Pt seen and examined at bedside. Reports pain score7/10 at rest  SCALE USED: (1-10 VNRS). Pt describes pain as aching, radiating to left buttock.  Pain is alleviated by pain medication and rest and is exacerbated by movement.  Pt reports chronic cervical and lumbar spine pain and was previously being treated with LESI and oral analgesics for chronic pain.  Pt states that she was under the care of a physician at [facility] pain management in [place] however she stopped going to the doctor as she was not pleased with the services.  Pt also reports chronic pain in left ankle s/p left ankle surgery in 2019. Pt states she is under the care of pain management services and takes Percocet 5/325 mg every 4 hours at home with good pain relief.  States that she prefers not to take NSAIDS. Pt tolerating PO diet. Denies lethargy, chest pain, SOB, nausea, vomiting, constipation. Reports last BM 12/16. Patient stated goal for pain control: to be able to take deep breaths, get out of bed to chair and ambulate with tolerable pain control. Pt ambulates with rolling waker at home.    PAST MEDICAL & SURGICAL HISTORY:  Cervical disc disorder at C6-C7 level with radiculopathy  with ulnar nerve compression    Anxiety    Carpal tunnel syndrome of left wrist    Breast mass, left    Uterine fibroid    Cataract  bilateral eyes    HPV in female    Fibrocystic breast disease    Subclavian arterial stenosis  left side 2018    Foot fracture, left  2016, s/p pins/screws    Osteoporosis    Chronic pain    History of spinal fusion  cervical C5-C7 with metal hardware-2012 ( discectomy)    S/P carpal tunnel release  left-2015    S/P decompression of ulnar nerve at elbow  left -2015 ( done with carpal tunnel release)    S/P lumpectomy, left breast  2010    S/P hysterectomy  2008    H/O bilateral breast reduction surgery  2002    S/P cataract surgery, right  with artificial lenses- November 2016    S/P tonsillectomy    S/P cataract surgery, left    S/P ORIF (open reduction internal fixation) fracture  left foot    FAMILY HISTORY:  Family history of non-Hodgkin's lymphoma (Mother)    Acute myocardial infarction (Father)    Social History:   [X] Denies ETOH use, illicit drug use and smoking    Allergies  Avelox (Other)  NSAIDs (Other; Stomach Upset)  penicillin (Other)    MEDICATIONS  (STANDING):  clonazePAM  Tablet 1 milliGRAM(s) Oral at bedtime  cyclobenzaprine 5 milliGRAM(s) Oral three times a day  enoxaparin Injectable 30 milliGRAM(s) SubCutaneous every 24 hours  influenza  Vaccine (HIGH DOSE) 0.7 milliLiter(s) IntraMuscular once  oxycodone    5 mG/acetaminophen 325 mG 1 Tablet(s) Oral every 6 hours  valsartan 40 milliGRAM(s) Oral daily    MEDICATIONS  (PRN):  acetaminophen     Tablet .. 650 milliGRAM(s) Oral every 6 hours PRN Temp greater or equal to 38C (100.4F), Mild Pain (1 - 3)  clonazePAM  Tablet 1 milliGRAM(s) Oral daily PRN anxiety  ketorolac   Injectable 15 milliGRAM(s) IV Push every 6 hours PRN Moderate Pain (4 - 6)  morphine  - Injectable 2 milliGRAM(s) IV Push every 6 hours PRN Severe Pain (7 - 10)  polyethylene glycol 3350 17 Gram(s) Oral daily PRN Constipation  senna 2 Tablet(s) Oral at bedtime PRN Constipation      Vital Signs Last 24 Hrs  T(C): 36.2 (19 Dec 2023 04:40), Max: 36.7 (18 Dec 2023 20:20)  T(F): 97.2 (19 Dec 2023 04:40), Max: 98 (18 Dec 2023 20:20)  HR: 82 (19 Dec 2023 04:40) (82 - 98)  BP: 150/87 (19 Dec 2023 04:40) (92/60 - 150/87)  BP(mean): 108 (19 Dec 2023 04:40) (70 - 108)  RR: 17 (19 Dec 2023 04:40) (17 - 18)  SpO2: 97% (19 Dec 2023 04:40) (97% - 98%)    Parameters below as of 19 Dec 2023 04:40  Patient On (Oxygen Delivery Method): room air    LABS: Reviewed.                          9.9    5.65  )-----------( 162      ( 19 Dec 2023 07:30 )             29.9     12-19    138  |  107  |  9   ----------------------------<  89  3.8   |  27  |  0.50    Ca    8.5      19 Dec 2023 07:30  Phos  3.5     12-19  Mg     2.2     12-19    TPro  6.5  /  Alb  3.0<L>  /  TBili  0.6  /  DBili  x   /  AST  20  /  ALT  24  /  AlkPhos  57  12-19    PT/INR - ( 18 Dec 2023 07:07 )   PT: 11.6 sec;   INR: 1.02 ratio       PTT - ( 18 Dec 2023 07:07 )  PTT:28.0 sec  LIVER FUNCTIONS - ( 19 Dec 2023 07:30 )  Alb: 3.0 g/dL / Pro: 6.5 g/dL / ALK PHOS: 57 U/L / ALT: 24 U/L DA / AST: 20 U/L / GGT: x           Urinalysis Basic - ( 19 Dec 2023 07:30 )    Color: x / Appearance: x / SG: x / pH: x  Gluc: 89 mg/dL / Ketone: x  / Bili: x / Urobili: x   Blood: x / Protein: x / Nitrite: x   Leuk Esterase: x / RBC: x / WBC x   Sq Epi: x / Non Sq Epi: x / Bacteria: x      Radiology: Reviewed.  < from: CT Pelvis Bony Only No Cont (12.18.23 @ 06:05) >    ACC: [id number] EXAM:  CT PELVIS BONY ONLY   ORDERED BY: ARISTEO MALLORY     PROCEDURE DATE:  12/18/2023      INTERPRETATION:  CLINICAL INFORMATION: Left hip pain.    TECHNIQUE: CT of the pelvis was performed in bone and soft tissue windows   withcoronal and sagittal reformats. No intravenous contrast was   administered. 3-D reformats were performed on a separate workstation.    COMPARISON: CT of the abdomen and pelvis from 10/7/2021.    FINDINGS:    Bone: An acute comminuted and mildly displaced fracture of the left   superior pubic ramus is seen extending into the left pubic symphysis and   left inferior pubic ramus. An acute comminuted and nondisplaced left   sacral ala fracture is noted. An old right superior and inferior pubic   ramus fractures are seen. No additional fracture or dislocation is   demonstrated.    Soft tissues: Mild subcutaneous inflammatory change is seen in the   lateral left hip. Enlargement of the left adductor musculature is noted   likely due to muscle strain and/or trauma.    Miscellaneous: Colonic diverticulosis is noted without diverticulitis.   The patient is status post a supracervical hysterectomy. No adnexal   masses are present. Urinary bladder is distended. Mild inflammatory   change is seen in the inferior left pelvis superior to the left superior   pubic ramus fracture.    IMPRESSION:    1. Acute comminuted and mildly displaced fracture of the left superior   pubic ramus extending into the left pubic symphysis and left inferior   pubic ramus.  2. Acute comminuted and nondisplaced left sacral ala fracture.    --- End of Report ---    GRAZYNA BAÑUELOS MD; Attending Radiologist  This document has been electronically signed. Dec 18 2023  6:17AM    < end of copied text >    ORT Score -   Family Hx of substance abuse	Female	      Male  Alcohol 	                                           1                     3  Illegal drugs	                                   2                     3  Rx drugs                                           4 	                  4  Personal Hx of substance abuse		  Alcohol 	                                          3	                  3  Illegal drugs                                     4	                  4  Rx drugs                                            5 	                  5  Age between 16- 45 years	           1                     1  hx preadolescent sexual abuse	   3 	                  0  Psychological disease		  ADD, OCD, bipolar, schizophrenia   2	          2  Depression                                           1 	          1  Total: 1    a score of 3 or lower indicates low risk for opioid abuse		  a score of 4-7 indicates moderate risk for opioid abuse		  a score of 8 or higher indicates high risk for opioid abuse  	  REVIEW OF SYSTEMS:  CONSTITUTIONAL: No fever or fatigue  HEENT:  No difficulty hearing, no change in vision  NECK: + chronic neck pain, no neck stiffness  RESPIRATORY: No cough, wheezing, chills or hemoptysis; No shortness of breath  CARDIOVASCULAR: No chest pain, palpitations, dizziness, or leg swelling  GASTROINTESTINAL: No loss of appetite, decreased PO intake. No abdominal or epigastric pain. No nausea, vomiting; No diarrhea or constipation.   GENITOURINARY: No dysuria, frequency, hematuria, retention or incontinence  MUSCULOSKELETAL: + left hip pain that radiates to left buttock. + tight sensation in left buttock. + bruising on left hip area,  no swelling; c/o chronic lumbar back pain with history of LESI, + left hip pain with movements, no upper or righ lower motor strength weakness, no saddle anesthesia, bowel/bladder incontinence, + fall, + left upper extremity numbness with + paresthesias  NEURO: No headaches, + LUE numbness with tingling, + left ankle burning pain chronic pain, + weakness in LLE  PSYCHIATRIC: No depression, anxiety or difficulty sleeping    PHYSICAL EXAM:  GENERAL:  Alert & Oriented X4, cooperative, NAD, Good concentration. Speech is clear.   RESPIRATORY: Respirations even and unlabored. Clear to auscultation bilaterally; No rales, rhonchi, wheezing, or rubs  CARDIOVASCULAR: Normal S1/S2, regular rate and rhythm; No murmurs, rubs, or gallops. No JVD.   GASTROINTESTINAL:  Soft, Nontender, Nondistended; Bowel sounds present  PERIPHERAL VASCULAR:  Extremities warm without edema. 2+ Peripheral Pulses, No cyanosis, No calf tenderness  MUSCULOSKELETAL: Motor Strength 5/5 B/L upper and lower extremities; moves all extremities equally against gravity; ROM intact; negative SLR; No tenderness on palpation of all joints.   SKIN: Warm, dry, intact. No rashes, lesions, scars or wounds.     Risk factors associated with adverse outcomes related to opioid treatment  [ ]  Concurrent benzodiazepine use  [ ]  History/ Active substance use or alcohol use disorder  [ ] Psychiatric co-morbidity  [ ] Sleep apnea  [ ] COPD  [ ] BMI> 35  [ ] Liver dysfunction  [ ] Renal dysfunction  [ ] CHF  [ ] Smoker  [ ]  Age > 60 years    [ ]  [place]  Reviewed and Copied to Chart. See below.    Plan of care and goal oriented pain management treatment options were discussed with patient and /or primary care giver; all questions and concerns were addressed and care was aligned with patient's wishes.    Educated patient on goal oriented pain management treatment options      Source of information: ROULA PASTOR, Chart review  Patient language: English  : n/a    HPI:  68 yrs old F, from home, ambulating independently, pmhx HTN, anxiety, pshx appendectomy, presented with back and hip pain s/p mechanical fall. Pt stated that she is unsure of how she has fallen down, did not trip but likely when turning lost balance and hit the wall or the floor. Pt denied chest pain, palpitation, dyspnea, dizziness, visual changes, abdominal pain, N/V/D, dysuria, fever, chills, unilateral weakness or decreased sensation however has been generally weak lately s/p appendectomy and was on the physical therapy at home for regaining her strength. Pt denied saddle anesthesia, urinary or bladder incontinence however endorsed inability to urinate despite the urge.   Pt denied alcohol consumption, smoking or use of illicit drugs. She endorsed getting Morphine after the surgery which was prescribed however no longer taking.  (18 Dec 2023 08:53)    Patient is a 68y old  Female who presents with a chief complaint of pubic and sacral fracture (19 Dec 2023 11:47)    CT pelvis 12/18 demonstrated acute comminuted and mildly displaced fracture of the left superior pubic ramus extending into the left pubic symphysis and left inferior pubic ramus.  Acute comminuted and nondisplaced left sacral ala fracture.    Pt is a 68 year old female admitted for public and sacral fracture after a fall sustained at home. Pain consulted on 12/19 for management of left hip/buttock pain.  Pt reports that she recently spent 6 weeks at [facility] for treatment of a ruptured appendix.  States that post hopitalization that she was receiving PT sessions at home to improve the strength in her LEs.  States that she was improving well however she "tripped over a cord while getting out of bed".  Pt seen and examined at bedside. Reports pain score7/10 at rest  SCALE USED: (1-10 VNRS). Pt describes pain as aching, radiating to left buttock.  Pain is alleviated by pain medication and rest and is exacerbated by movement.  Pt reports chronic cervical and lumbar spine pain and was previously being treated with LESI and oral analgesics for chronic pain.  Pt states that she was under the care of a physician at [facility] pain management in [place] however she stopped going to the doctor as she was not pleased with the services.  Pt also reports chronic pain in left ankle s/p left ankle surgery in 2019. Pt states she is under the care of pain management services and takes Percocet 5/325 mg every 4 hours at home with good pain relief.  States that she prefers not to take NSAIDS. Pt tolerating PO diet. Denies lethargy, chest pain, SOB, nausea, vomiting, constipation. Reports last BM 12/16. Patient stated goal for pain control: to be able to take deep breaths, get out of bed to chair and ambulate with tolerable pain control. Pt ambulates with rolling waker at home.    PAST MEDICAL & SURGICAL HISTORY:  Cervical disc disorder at C6-C7 level with radiculopathy  with ulnar nerve compression    Anxiety    Carpal tunnel syndrome of left wrist    Breast mass, left    Uterine fibroid    Cataract  bilateral eyes    HPV in female    Fibrocystic breast disease    Subclavian arterial stenosis  left side 2018    Foot fracture, left  2016, s/p pins/screws    Osteoporosis    Chronic pain    History of spinal fusion  cervical C5-C7 with metal hardware-2012 ( discectomy)    S/P carpal tunnel release  left-2015    S/P decompression of ulnar nerve at elbow  left -2015 ( done with carpal tunnel release)    S/P lumpectomy, left breast  2010    S/P hysterectomy  2008    H/O bilateral breast reduction surgery  2002    S/P cataract surgery, right  with artificial lenses- November 2016    S/P tonsillectomy    S/P cataract surgery, left    S/P ORIF (open reduction internal fixation) fracture  left foot    FAMILY HISTORY:  Family history of non-Hodgkin's lymphoma (Mother)    Acute myocardial infarction (Father)    Social History:   [X] Denies ETOH use, illicit drug use and smoking    Allergies  Avelox (Other)  NSAIDs (Other; Stomach Upset)  penicillin (Other)    MEDICATIONS  (STANDING):  clonazePAM  Tablet 1 milliGRAM(s) Oral at bedtime  cyclobenzaprine 5 milliGRAM(s) Oral three times a day  enoxaparin Injectable 30 milliGRAM(s) SubCutaneous every 24 hours  influenza  Vaccine (HIGH DOSE) 0.7 milliLiter(s) IntraMuscular once  oxycodone    5 mG/acetaminophen 325 mG 1 Tablet(s) Oral every 6 hours  valsartan 40 milliGRAM(s) Oral daily    MEDICATIONS  (PRN):  acetaminophen     Tablet .. 650 milliGRAM(s) Oral every 6 hours PRN Temp greater or equal to 38C (100.4F), Mild Pain (1 - 3)  clonazePAM  Tablet 1 milliGRAM(s) Oral daily PRN anxiety  ketorolac   Injectable 15 milliGRAM(s) IV Push every 6 hours PRN Moderate Pain (4 - 6)  morphine  - Injectable 2 milliGRAM(s) IV Push every 6 hours PRN Severe Pain (7 - 10)  polyethylene glycol 3350 17 Gram(s) Oral daily PRN Constipation  senna 2 Tablet(s) Oral at bedtime PRN Constipation      Vital Signs Last 24 Hrs  T(C): 36.2 (19 Dec 2023 04:40), Max: 36.7 (18 Dec 2023 20:20)  T(F): 97.2 (19 Dec 2023 04:40), Max: 98 (18 Dec 2023 20:20)  HR: 82 (19 Dec 2023 04:40) (82 - 98)  BP: 150/87 (19 Dec 2023 04:40) (92/60 - 150/87)  BP(mean): 108 (19 Dec 2023 04:40) (70 - 108)  RR: 17 (19 Dec 2023 04:40) (17 - 18)  SpO2: 97% (19 Dec 2023 04:40) (97% - 98%)    Parameters below as of 19 Dec 2023 04:40  Patient On (Oxygen Delivery Method): room air    LABS: Reviewed.                          9.9    5.65  )-----------( 162      ( 19 Dec 2023 07:30 )             29.9     12-19    138  |  107  |  9   ----------------------------<  89  3.8   |  27  |  0.50    Ca    8.5      19 Dec 2023 07:30  Phos  3.5     12-19  Mg     2.2     12-19    TPro  6.5  /  Alb  3.0<L>  /  TBili  0.6  /  DBili  x   /  AST  20  /  ALT  24  /  AlkPhos  57  12-19    PT/INR - ( 18 Dec 2023 07:07 )   PT: 11.6 sec;   INR: 1.02 ratio       PTT - ( 18 Dec 2023 07:07 )  PTT:28.0 sec  LIVER FUNCTIONS - ( 19 Dec 2023 07:30 )  Alb: 3.0 g/dL / Pro: 6.5 g/dL / ALK PHOS: 57 U/L / ALT: 24 U/L DA / AST: 20 U/L / GGT: x           Urinalysis Basic - ( 19 Dec 2023 07:30 )    Color: x / Appearance: x / SG: x / pH: x  Gluc: 89 mg/dL / Ketone: x  / Bili: x / Urobili: x   Blood: x / Protein: x / Nitrite: x   Leuk Esterase: x / RBC: x / WBC x   Sq Epi: x / Non Sq Epi: x / Bacteria: x      Radiology: Reviewed.  < from: CT Pelvis Bony Only No Cont (12.18.23 @ 06:05) >    ACC: [id number] EXAM:  CT PELVIS BONY ONLY   ORDERED BY: ARISTEO MALLORY     PROCEDURE DATE:  12/18/2023      INTERPRETATION:  CLINICAL INFORMATION: Left hip pain.    TECHNIQUE: CT of the pelvis was performed in bone and soft tissue windows   withcoronal and sagittal reformats. No intravenous contrast was   administered. 3-D reformats were performed on a separate workstation.    COMPARISON: CT of the abdomen and pelvis from 10/7/2021.    FINDINGS:    Bone: An acute comminuted and mildly displaced fracture of the left   superior pubic ramus is seen extending into the left pubic symphysis and   left inferior pubic ramus. An acute comminuted and nondisplaced left   sacral ala fracture is noted. An old right superior and inferior pubic   ramus fractures are seen. No additional fracture or dislocation is   demonstrated.    Soft tissues: Mild subcutaneous inflammatory change is seen in the   lateral left hip. Enlargement of the left adductor musculature is noted   likely due to muscle strain and/or trauma.    Miscellaneous: Colonic diverticulosis is noted without diverticulitis.   The patient is status post a supracervical hysterectomy. No adnexal   masses are present. Urinary bladder is distended. Mild inflammatory   change is seen in the inferior left pelvis superior to the left superior   pubic ramus fracture.    IMPRESSION:    1. Acute comminuted and mildly displaced fracture of the left superior   pubic ramus extending into the left pubic symphysis and left inferior   pubic ramus.  2. Acute comminuted and nondisplaced left sacral ala fracture.    --- End of Report ---    GRAZYNA BAÑUELOS MD; Attending Radiologist  This document has been electronically signed. Dec 18 2023  6:17AM    < end of copied text >    ORT Score -   Family Hx of substance abuse	Female	      Male  Alcohol 	                                           1                     3  Illegal drugs	                                   2                     3  Rx drugs                                           4 	                  4  Personal Hx of substance abuse		  Alcohol 	                                          3	                  3  Illegal drugs                                     4	                  4  Rx drugs                                            5 	                  5  Age between 16- 45 years	           1                     1  hx preadolescent sexual abuse	   3 	                  0  Psychological disease		  ADD, OCD, bipolar, schizophrenia   2	          2  Depression                                           1 	          1  Total: 1    a score of 3 or lower indicates low risk for opioid abuse		  a score of 4-7 indicates moderate risk for opioid abuse		  a score of 8 or higher indicates high risk for opioid abuse  	  REVIEW OF SYSTEMS:  CONSTITUTIONAL: No fever or fatigue  HEENT:  No difficulty hearing, no change in vision  NECK: + chronic neck pain, no neck stiffness  RESPIRATORY: No cough, wheezing, chills or hemoptysis; No shortness of breath  CARDIOVASCULAR: No chest pain, palpitations, dizziness, or leg swelling  GASTROINTESTINAL: No loss of appetite, decreased PO intake. No abdominal or epigastric pain. No nausea, vomiting; No diarrhea or constipation.   GENITOURINARY: No dysuria, frequency, hematuria, retention or incontinence  MUSCULOSKELETAL: + left hip pain that radiates to left buttock. + tight sensation in left buttock. + bruising on left hip area,  no swelling; c/o chronic lumbar back pain with history of LESI, + left hip pain with movements, no upper or righ lower motor strength weakness, no saddle anesthesia, bowel/bladder incontinence, + fall, + left upper extremity numbness with + paresthesias  NEURO: No headaches, + LUE numbness with tingling, + left ankle burning pain chronic pain, + weakness in LLE  PSYCHIATRIC: No depression, anxiety or difficulty sleeping    PHYSICAL EXAM:  GENERAL:  Alert & Oriented X4, cooperative, NAD, Good concentration. Speech is clear.   RESPIRATORY: Respirations even and unlabored. Clear to auscultation bilaterally; No rales, rhonchi, wheezing, or rubs  CARDIOVASCULAR: Normal S1/S2, regular rate and rhythm; No murmurs, rubs, or gallops. No JVD.   GASTROINTESTINAL:  Soft, Nontender, Nondistended; Bowel sounds present  PERIPHERAL VASCULAR:  Extremities warm without edema. 2+ Peripheral Pulses, No cyanosis, No calf tenderness  MUSCULOSKELETAL: Motor Strength 5/5 B/L upper and lower extremities; moves all extremities equally against gravity; ROM intact; negative SLR; No tenderness on palpation of all joints.   SKIN: Warm, dry, intact. No rashes, lesions, scars or wounds.     Risk factors associated with adverse outcomes related to opioid treatment  [ ]  Concurrent benzodiazepine use  [ ]  History/ Active substance use or alcohol use disorder  [ ] Psychiatric co-morbidity  [ ] Sleep apnea  [ ] COPD  [ ] BMI> 35  [ ] Liver dysfunction  [ ] Renal dysfunction  [ ] CHF  [ ] Smoker  [ ]  Age > 60 years    [ ]  [place]  Reviewed and Copied to Chart. See below.    Plan of care and goal oriented pain management treatment options were discussed with patient and /or primary care giver; all questions and concerns were addressed and care was aligned with patient's wishes.    Educated patient on goal oriented pain management treatment options      Source of information: ROULA PASTOR, Chart review  Patient language: English  : n/a    HPI:  68 yrs old F, from home, ambulating independently, pmhx HTN, anxiety, pshx appendectomy, presented with back and hip pain s/p mechanical fall. Pt stated that she is unsure of how she has fallen down, did not trip but likely when turning lost balance and hit the wall or the floor. Pt denied chest pain, palpitation, dyspnea, dizziness, visual changes, abdominal pain, N/V/D, dysuria, fever, chills, unilateral weakness or decreased sensation however has been generally weak lately s/p appendectomy and was on the physical therapy at home for regaining her strength. Pt denied saddle anesthesia, urinary or bladder incontinence however endorsed inability to urinate despite the urge.   Pt denied alcohol consumption, smoking or use of illicit drugs. She endorsed getting Morphine after the surgery which was prescribed however no longer taking.  (18 Dec 2023 08:53)    Patient is a 68y old  Female who presents with a chief complaint of pubic and sacral fracture (19 Dec 2023 11:47)    CT pelvis 12/18 demonstrated acute comminuted and mildly displaced fracture of the left superior pubic ramus extending into the left pubic symphysis and left inferior pubic ramus.  Acute comminuted and nondisplaced left sacral ala fracture.    Pt is a 68 year old female admitted for public and sacral fracture after a fall sustained at home. Pt denies LOC with fall.  Pain consulted on 12/19 for management of left hip/buttock pain and other chronic pain.  Pt reports that she recently spent 6 weeks at [facility] for treatment of a ruptured appendix with surgical/medical management.  States that post hopitalization that she was participating in PT sessions at home to improve the strength in her LEs.  States that she was improving well however she "tripped over a cord while getting out of bed" last Sunday.  Pt seen and examined at bedside sitting up while eating breakfast. Reports pain score7/10 at rest  SCALE USED: (1-10 VNRS). Pt describes pain as aching, deep radiating to left buttock.  Pain is alleviated by pain medication and rest and is exacerbated by movement.  Pt reports chronic cervical and lumbar spine pain and was previously being treated with LESI and oral analgesics for chronic pain.  Pt states that she was under the care of a physician at [facility] pain management in [place] however she stopped going to the doctor as she was not pleased with the services.  Pt also reports chronic left ankle pain s/p left ankle surgery in 2019. Pt states she is under the care of a different pain management provider with whom she is pleased and takes Percocet 5/325 mg every 4 hours at home with good pain relief. Verified on IStop.  States that she prefers not to take NSAIDS. Pt tolerating PO diet. Denies lethargy, chest pain, SOB, nausea, vomiting, constipation. Reports last BM 12/16. Patient stated goal for pain control: to be able to take deep breaths, get out of bed to chair and ambulate with tolerable pain control. Pt ambulates with rolling waker at home.    PAST MEDICAL & SURGICAL HISTORY:  Cervical disc disorder at C6-C7 level with radiculopathy  with ulnar nerve compression    Anxiety    Carpal tunnel syndrome of left wrist    Breast mass, left    Uterine fibroid    Cataract  bilateral eyes    HPV in female    Fibrocystic breast disease    Subclavian arterial stenosis  left side 2018    Foot fracture, left  2016, s/p pins/screws    Osteoporosis    Chronic pain    History of spinal fusion  cervical C5-C7 with metal hardware-2012 ( discectomy)    S/P carpal tunnel release  left-2015    S/P decompression of ulnar nerve at elbow  left -2015 ( done with carpal tunnel release)    S/P lumpectomy, left breast  2010    S/P hysterectomy  2008    H/O bilateral breast reduction surgery  2002    S/P cataract surgery, right  with artificial lenses- November 2016    S/P tonsillectomy    S/P cataract surgery, left    S/P ORIF (open reduction internal fixation) fracture  left foot    FAMILY HISTORY:  Family history of non-Hodgkin's lymphoma (Mother)    Acute myocardial infarction (Father)    Social History:   [X] Denies ETOH use, illicit drug use and smoking    Allergies  Avelox (Other)  NSAIDs (Other; Stomach Upset)  penicillin (Other)    MEDICATIONS  (STANDING):  clonazePAM  Tablet 1 milliGRAM(s) Oral at bedtime  cyclobenzaprine 5 milliGRAM(s) Oral three times a day  enoxaparin Injectable 30 milliGRAM(s) SubCutaneous every 24 hours  influenza  Vaccine (HIGH DOSE) 0.7 milliLiter(s) IntraMuscular once  oxycodone    5 mG/acetaminophen 325 mG 1 Tablet(s) Oral every 6 hours  valsartan 40 milliGRAM(s) Oral daily    MEDICATIONS  (PRN):  acetaminophen     Tablet .. 650 milliGRAM(s) Oral every 6 hours PRN Temp greater or equal to 38C (100.4F), Mild Pain (1 - 3)  clonazePAM  Tablet 1 milliGRAM(s) Oral daily PRN anxiety  ketorolac   Injectable 15 milliGRAM(s) IV Push every 6 hours PRN Moderate Pain (4 - 6)  morphine  - Injectable 2 milliGRAM(s) IV Push every 6 hours PRN Severe Pain (7 - 10)  polyethylene glycol 3350 17 Gram(s) Oral daily PRN Constipation  senna 2 Tablet(s) Oral at bedtime PRN Constipation      Vital Signs Last 24 Hrs  T(C): 36.2 (19 Dec 2023 04:40), Max: 36.7 (18 Dec 2023 20:20)  T(F): 97.2 (19 Dec 2023 04:40), Max: 98 (18 Dec 2023 20:20)  HR: 82 (19 Dec 2023 04:40) (82 - 98)  BP: 150/87 (19 Dec 2023 04:40) (92/60 - 150/87)  BP(mean): 108 (19 Dec 2023 04:40) (70 - 108)  RR: 17 (19 Dec 2023 04:40) (17 - 18)  SpO2: 97% (19 Dec 2023 04:40) (97% - 98%)    Parameters below as of 19 Dec 2023 04:40  Patient On (Oxygen Delivery Method): room air    LABS: Reviewed.                          9.9    5.65  )-----------( 162      ( 19 Dec 2023 07:30 )             29.9     12-19    138  |  107  |  9   ----------------------------<  89  3.8   |  27  |  0.50    Ca    8.5      19 Dec 2023 07:30  Phos  3.5     12-19  Mg     2.2     12-19    TPro  6.5  /  Alb  3.0<L>  /  TBili  0.6  /  DBili  x   /  AST  20  /  ALT  24  /  AlkPhos  57  12-19    PT/INR - ( 18 Dec 2023 07:07 )   PT: 11.6 sec;   INR: 1.02 ratio       PTT - ( 18 Dec 2023 07:07 )  PTT:28.0 sec  LIVER FUNCTIONS - ( 19 Dec 2023 07:30 )  Alb: 3.0 g/dL / Pro: 6.5 g/dL / ALK PHOS: 57 U/L / ALT: 24 U/L DA / AST: 20 U/L / GGT: x           Urinalysis Basic - ( 19 Dec 2023 07:30 )    Color: x / Appearance: x / SG: x / pH: x  Gluc: 89 mg/dL / Ketone: x  / Bili: x / Urobili: x   Blood: x / Protein: x / Nitrite: x   Leuk Esterase: x / RBC: x / WBC x   Sq Epi: x / Non Sq Epi: x / Bacteria: x      Radiology: Reviewed.  < from: CT Pelvis Bony Only No Cont (12.18.23 @ 06:05) >    ACC: [id number] EXAM:  CT PELVIS BONY ONLY   ORDERED BY: ARISTEO MALLORY     PROCEDURE DATE:  12/18/2023      INTERPRETATION:  CLINICAL INFORMATION: Left hip pain.    TECHNIQUE: CT of the pelvis was performed in bone and soft tissue windows   withcoronal and sagittal reformats. No intravenous contrast was   administered. 3-D reformats were performed on a separate workstation.    COMPARISON: CT of the abdomen and pelvis from 10/7/2021.    FINDINGS:    Bone: An acute comminuted and mildly displaced fracture of the left   superior pubic ramus is seen extending into the left pubic symphysis and   left inferior pubic ramus. An acute comminuted and nondisplaced left   sacral ala fracture is noted. An old right superior and inferior pubic   ramus fractures are seen. No additional fracture or dislocation is   demonstrated.    Soft tissues: Mild subcutaneous inflammatory change is seen in the   lateral left hip. Enlargement of the left adductor musculature is noted   likely due to muscle strain and/or trauma.    Miscellaneous: Colonic diverticulosis is noted without diverticulitis.   The patient is status post a supracervical hysterectomy. No adnexal   masses are present. Urinary bladder is distended. Mild inflammatory   change is seen in the inferior left pelvis superior to the left superior   pubic ramus fracture.    IMPRESSION:    1. Acute comminuted and mildly displaced fracture of the left superior   pubic ramus extending into the left pubic symphysis and left inferior   pubic ramus.  2. Acute comminuted and nondisplaced left sacral ala fracture.    --- End of Report ---    GRAZYNA BAÑUELOS MD; Attending Radiologist  This document has been electronically signed. Dec 18 2023  6:17AM    < end of copied text >    ORT Score -   Family Hx of substance abuse	Female	      Male  Alcohol 	                                           1                     3  Illegal drugs	                                   2                     3  Rx drugs                                           4 	                  4  Personal Hx of substance abuse		  Alcohol 	                                          3	                  3  Illegal drugs                                     4	                  4  Rx drugs                                            5 	                  5  Age between 16- 45 years	           1                     1  hx preadolescent sexual abuse	   3 	                  0  Psychological disease		  ADD, OCD, bipolar, schizophrenia   2	          2  Depression                                           1 	          1  Total: 1    a score of 3 or lower indicates low risk for opioid abuse		  a score of 4-7 indicates moderate risk for opioid abuse		  a score of 8 or higher indicates high risk for opioid abuse  	  REVIEW OF SYSTEMS:  CONSTITUTIONAL: No fever or fatigue  HEENT:  No difficulty hearing, no change in vision  NECK: + chronic neck pain, no neck stiffness  RESPIRATORY: No cough, wheezing, chills or hemoptysis; No shortness of breath  CARDIOVASCULAR: No chest pain, palpitations, dizziness, or leg swelling  GASTROINTESTINAL: No loss of appetite, + decreased PO intake. No abdominal or epigastric pain. No nausea, vomiting; No diarrhea or constipation.   GENITOURINARY: No dysuria, frequency, hematuria, retention or incontinence  MUSCULOSKELETAL: + left hip pain that radiates to left buttock. + tight sensation in left buttock. + bruising on left hip area,  no swelling; + c/o chronic lumbar back pain with history of LESI, + C spine surgery in 2013, + left hip pain with movements, no right upper or right lower motor strength weakness, no saddle anesthesia, bowel/bladder incontinence, + fall, + left upper extremity numbness with + paresthesias  NEURO: No headaches, + LUE numbness with tingling, + left ankle burning pain chronic pain, + weakness in LLE  PSYCHIATRIC: + history of depression    PHYSICAL EXAM:  GENERAL:  Alert & Oriented X 4, cooperative, NAD, Good concentration. Speech is clear.   RESPIRATORY: Respirations even and unlabored. Clear to auscultation bilaterally; No rales, rhonchi, wheezing, or rubs  CARDIOVASCULAR: Normal S1/S2, regular rate and rhythm; No murmurs, rubs, or gallops. No JVD.   GASTROINTESTINAL:  Soft, Nontender, Nondistended; Bowel sounds present  PERIPHERAL VASCULAR:  Extremities warm without edema. 2+ Peripheral Pulses, No cyanosis, No calf tenderness  MUSCULOSKELETAL: Motor Strength 5/5 B/L upper and RLE, 3/5 left lower extremity;  ROM intact in Both upper extremities and right LE, + ROM LLE with c/o pain with movement; + SLR at 40 degress in LLE; + tenderness on palpation of left hip, left pubic   SKIN: Warm, dry, intact. No rashes, lesions, + ecchymosis in left hip    Risk factors associated with adverse outcomes related to opioid treatment  [X]  Concurrent benzodiazepine use  [ ]  History/ Active substance use or alcohol use disorder  [X] Psychiatric co-morbidity  [ ] Sleep apnea  [ ] COPD  [ ] BMI> 35  [ ] Liver dysfunction  [ ] Renal dysfunction  [ ] CHF  [ ] Smoker  [X]  Age > 60 years    [X]  [place]  Reviewed and Copied to Chart. See below.    Plan of care and goal oriented pain management treatment options were discussed with patient and /or primary care giver; all questions and concerns were addressed and care was aligned with patient's wishes.    Educated patient on goal oriented pain management treatment options      Source of information: ROULA PASTOR, Chart review  Patient language: English  : n/a    HPI:  68 yrs old F, from home, ambulating independently, pmhx HTN, anxiety, pshx appendectomy, presented with back and hip pain s/p mechanical fall. Pt stated that she is unsure of how she has fallen down, did not trip but likely when turning lost balance and hit the wall or the floor. Pt denied chest pain, palpitation, dyspnea, dizziness, visual changes, abdominal pain, N/V/D, dysuria, fever, chills, unilateral weakness or decreased sensation however has been generally weak lately s/p appendectomy and was on the physical therapy at home for regaining her strength. Pt denied saddle anesthesia, urinary or bladder incontinence however endorsed inability to urinate despite the urge.   Pt denied alcohol consumption, smoking or use of illicit drugs. She endorsed getting Morphine after the surgery which was prescribed however no longer taking.  (18 Dec 2023 08:53)    Patient is a 68y old  Female who presents with a chief complaint of pubic and sacral fracture (19 Dec 2023 11:47)    CT pelvis 12/18 demonstrated acute comminuted and mildly displaced fracture of the left superior pubic ramus extending into the left pubic symphysis and left inferior pubic ramus.  Acute comminuted and nondisplaced left sacral ala fracture.    Pt is a 68 year old female admitted for public and sacral fracture after a fall sustained at home. Pt denies LOC with fall.  Pain consulted on 12/19 for management of left hip/buttock pain and other chronic pain.  Pt reports that she recently spent 6 weeks at [facility] for treatment of a ruptured appendix with surgical/medical management.  States that post hopitalization that she was participating in PT sessions at home to improve the strength in her LEs.  States that she was improving well however she "tripped over a cord while getting out of bed" last Sunday.  Pt seen and examined at bedside sitting up while eating breakfast. Reports pain score7/10 at rest  SCALE USED: (1-10 VNRS). Pt describes pain as aching, deep radiating to left buttock.  Pain is alleviated by pain medication and rest and is exacerbated by movement.  Pt reports chronic cervical and lumbar spine pain and was previously being treated with LESI and oral analgesics for chronic pain.  Pt states that she was under the care of a physician at [facility] pain management in [place] however she stopped going to the doctor as she was not pleased with the services.  Pt also reports chronic left ankle pain s/p left ankle surgery in 2019. Pt states she is under the care of a different pain management provider with whom she is pleased and takes Percocet 5/325 mg every 4 hours at home with good pain relief. Verified on IStop.  States that she prefers not to take NSAIDS. Pt tolerating PO diet. Denies lethargy, chest pain, SOB, nausea, vomiting, constipation. Reports last BM 12/16. Patient stated goal for pain control: to be able to take deep breaths, get out of bed to chair and ambulate with tolerable pain control. Pt ambulates with rolling waker at home.    PAST MEDICAL & SURGICAL HISTORY:  Cervical disc disorder at C6-C7 level with radiculopathy  with ulnar nerve compression    Anxiety    Carpal tunnel syndrome of left wrist    Breast mass, left    Uterine fibroid    Cataract  bilateral eyes    HPV in female    Fibrocystic breast disease    Subclavian arterial stenosis  left side 2018    Foot fracture, left  2016, s/p pins/screws    Osteoporosis    Chronic pain    History of spinal fusion  cervical C5-C7 with metal hardware-2012 ( discectomy)    S/P carpal tunnel release  left-2015    S/P decompression of ulnar nerve at elbow  left -2015 ( done with carpal tunnel release)    S/P lumpectomy, left breast  2010    S/P hysterectomy  2008    H/O bilateral breast reduction surgery  2002    S/P cataract surgery, right  with artificial lenses- November 2016    S/P tonsillectomy    S/P cataract surgery, left    S/P ORIF (open reduction internal fixation) fracture  left foot    FAMILY HISTORY:  Family history of non-Hodgkin's lymphoma (Mother)    Acute myocardial infarction (Father)    Social History:   [X] Denies ETOH use, illicit drug use and smoking    Allergies  Avelox (Other)  NSAIDs (Other; Stomach Upset)  penicillin (Other)    MEDICATIONS  (STANDING):  clonazePAM  Tablet 1 milliGRAM(s) Oral at bedtime  cyclobenzaprine 5 milliGRAM(s) Oral three times a day  enoxaparin Injectable 30 milliGRAM(s) SubCutaneous every 24 hours  influenza  Vaccine (HIGH DOSE) 0.7 milliLiter(s) IntraMuscular once  oxycodone    5 mG/acetaminophen 325 mG 1 Tablet(s) Oral every 6 hours  valsartan 40 milliGRAM(s) Oral daily    MEDICATIONS  (PRN):  acetaminophen     Tablet .. 650 milliGRAM(s) Oral every 6 hours PRN Temp greater or equal to 38C (100.4F), Mild Pain (1 - 3)  clonazePAM  Tablet 1 milliGRAM(s) Oral daily PRN anxiety  ketorolac   Injectable 15 milliGRAM(s) IV Push every 6 hours PRN Moderate Pain (4 - 6)  morphine  - Injectable 2 milliGRAM(s) IV Push every 6 hours PRN Severe Pain (7 - 10)  polyethylene glycol 3350 17 Gram(s) Oral daily PRN Constipation  senna 2 Tablet(s) Oral at bedtime PRN Constipation      Vital Signs Last 24 Hrs  T(C): 36.2 (19 Dec 2023 04:40), Max: 36.7 (18 Dec 2023 20:20)  T(F): 97.2 (19 Dec 2023 04:40), Max: 98 (18 Dec 2023 20:20)  HR: 82 (19 Dec 2023 04:40) (82 - 98)  BP: 150/87 (19 Dec 2023 04:40) (92/60 - 150/87)  BP(mean): 108 (19 Dec 2023 04:40) (70 - 108)  RR: 17 (19 Dec 2023 04:40) (17 - 18)  SpO2: 97% (19 Dec 2023 04:40) (97% - 98%)    Parameters below as of 19 Dec 2023 04:40  Patient On (Oxygen Delivery Method): room air    LABS: Reviewed.                          9.9    5.65  )-----------( 162      ( 19 Dec 2023 07:30 )             29.9     12-19    138  |  107  |  9   ----------------------------<  89  3.8   |  27  |  0.50    Ca    8.5      19 Dec 2023 07:30  Phos  3.5     12-19  Mg     2.2     12-19    TPro  6.5  /  Alb  3.0<L>  /  TBili  0.6  /  DBili  x   /  AST  20  /  ALT  24  /  AlkPhos  57  12-19    PT/INR - ( 18 Dec 2023 07:07 )   PT: 11.6 sec;   INR: 1.02 ratio       PTT - ( 18 Dec 2023 07:07 )  PTT:28.0 sec  LIVER FUNCTIONS - ( 19 Dec 2023 07:30 )  Alb: 3.0 g/dL / Pro: 6.5 g/dL / ALK PHOS: 57 U/L / ALT: 24 U/L DA / AST: 20 U/L / GGT: x           Urinalysis Basic - ( 19 Dec 2023 07:30 )    Color: x / Appearance: x / SG: x / pH: x  Gluc: 89 mg/dL / Ketone: x  / Bili: x / Urobili: x   Blood: x / Protein: x / Nitrite: x   Leuk Esterase: x / RBC: x / WBC x   Sq Epi: x / Non Sq Epi: x / Bacteria: x      Radiology: Reviewed.  < from: CT Pelvis Bony Only No Cont (12.18.23 @ 06:05) >    ACC: [id number] EXAM:  CT PELVIS BONY ONLY   ORDERED BY: ARISTEO MALLORY     PROCEDURE DATE:  12/18/2023      INTERPRETATION:  CLINICAL INFORMATION: Left hip pain.    TECHNIQUE: CT of the pelvis was performed in bone and soft tissue windows   withcoronal and sagittal reformats. No intravenous contrast was   administered. 3-D reformats were performed on a separate workstation.    COMPARISON: CT of the abdomen and pelvis from 10/7/2021.    FINDINGS:    Bone: An acute comminuted and mildly displaced fracture of the left   superior pubic ramus is seen extending into the left pubic symphysis and   left inferior pubic ramus. An acute comminuted and nondisplaced left   sacral ala fracture is noted. An old right superior and inferior pubic   ramus fractures are seen. No additional fracture or dislocation is   demonstrated.    Soft tissues: Mild subcutaneous inflammatory change is seen in the   lateral left hip. Enlargement of the left adductor musculature is noted   likely due to muscle strain and/or trauma.    Miscellaneous: Colonic diverticulosis is noted without diverticulitis.   The patient is status post a supracervical hysterectomy. No adnexal   masses are present. Urinary bladder is distended. Mild inflammatory   change is seen in the inferior left pelvis superior to the left superior   pubic ramus fracture.    IMPRESSION:    1. Acute comminuted and mildly displaced fracture of the left superior   pubic ramus extending into the left pubic symphysis and left inferior   pubic ramus.  2. Acute comminuted and nondisplaced left sacral ala fracture.    --- End of Report ---    GRAZYNA BAÑUELOS MD; Attending Radiologist  This document has been electronically signed. Dec 18 2023  6:17AM    < end of copied text >    ORT Score -   Family Hx of substance abuse	Female	      Male  Alcohol 	                                           1                     3  Illegal drugs	                                   2                     3  Rx drugs                                           4 	                  4  Personal Hx of substance abuse		  Alcohol 	                                          3	                  3  Illegal drugs                                     4	                  4  Rx drugs                                            5 	                  5  Age between 16- 45 years	           1                     1  hx preadolescent sexual abuse	   3 	                  0  Psychological disease		  ADD, OCD, bipolar, schizophrenia   2	          2  Depression                                           1 	          1  Total: 1    a score of 3 or lower indicates low risk for opioid abuse		  a score of 4-7 indicates moderate risk for opioid abuse		  a score of 8 or higher indicates high risk for opioid abuse  	  REVIEW OF SYSTEMS:  CONSTITUTIONAL: No fever or fatigue  HEENT:  No difficulty hearing, no change in vision  NECK: + chronic neck pain, no neck stiffness  RESPIRATORY: No cough, wheezing, chills or hemoptysis; No shortness of breath  CARDIOVASCULAR: No chest pain, palpitations, dizziness, or leg swelling  GASTROINTESTINAL: No loss of appetite, + decreased PO intake. No abdominal or epigastric pain. No nausea, vomiting; No diarrhea or constipation.   GENITOURINARY: No dysuria, frequency, hematuria, retention or incontinence  MUSCULOSKELETAL: + left hip pain that radiates to left buttock. + tight sensation in left buttock. + bruising on left hip area,  no swelling; + c/o chronic lumbar back pain with history of LESI, + C spine surgery in 2013, + left hip pain with movements, no right upper or right lower motor strength weakness, no saddle anesthesia, bowel/bladder incontinence, + fall, + left upper extremity numbness with + paresthesias  NEURO: No headaches, + LUE numbness with tingling, + left ankle burning pain chronic pain, + weakness in LLE  PSYCHIATRIC: + history of depression    PHYSICAL EXAM:  GENERAL:  Alert & Oriented X 4, cooperative, NAD, Good concentration. Speech is clear.   RESPIRATORY: Respirations even and unlabored. Clear to auscultation bilaterally; No rales, rhonchi, wheezing, or rubs  CARDIOVASCULAR: Normal S1/S2, regular rate and rhythm; No murmurs, rubs, or gallops. No JVD.   GASTROINTESTINAL:  Soft, Nontender, Nondistended; Bowel sounds present  PERIPHERAL VASCULAR:  Extremities warm without edema. 2+ Peripheral Pulses, No cyanosis, No calf tenderness  MUSCULOSKELETAL: Motor Strength 5/5 B/L upper and RLE, 3/5 left lower extremity;  ROM intact in Both upper extremities and right LE, + ROM LLE with c/o pain with movement; + SLR at 40 degress in LLE; + tenderness on palpation of left hip, left pubic   SKIN: Warm, dry, intact. No rashes, lesions, + ecchymosis in left hip    Risk factors associated with adverse outcomes related to opioid treatment  [X]  Concurrent benzodiazepine use  [ ]  History/ Active substance use or alcohol use disorder  [X] Psychiatric co-morbidity  [ ] Sleep apnea  [ ] COPD  [ ] BMI> 35  [ ] Liver dysfunction  [ ] Renal dysfunction  [ ] CHF  [ ] Smoker  [X]  Age > 60 years    [X]  [place]  Reviewed and Copied to Chart. See below.    Plan of care and goal oriented pain management treatment options were discussed with patient and /or primary care giver; all questions and concerns were addressed and care was aligned with patient's wishes.    Educated patient on goal oriented pain management treatment options      Source of information: ROULA PASTOR, Chart review  Patient language: English  : n/a    HPI:  68 yrs old F, from home, ambulating independently, pmhx HTN, anxiety, pshx appendectomy, presented with back and hip pain s/p mechanical fall. Pt stated that she is unsure of how she has fallen down, did not trip but likely when turning lost balance and hit the wall or the floor. Pt denied chest pain, palpitation, dyspnea, dizziness, visual changes, abdominal pain, N/V/D, dysuria, fever, chills, unilateral weakness or decreased sensation however has been generally weak lately s/p appendectomy and was on the physical therapy at home for regaining her strength. Pt denied saddle anesthesia, urinary or bladder incontinence however endorsed inability to urinate despite the urge.   Pt denied alcohol consumption, smoking or use of illicit drugs. She endorsed getting Morphine after the surgery which was prescribed however no longer taking.  (18 Dec 2023 08:53)    Patient is a 68y old  Female who presents with a chief complaint of pubic and sacral fracture (19 Dec 2023 11:47)    CT pelvis 12/18 demonstrated acute comminuted and mildly displaced fracture of the left superior pubic ramus extending into the left pubic symphysis and left inferior pubic ramus.  Acute comminuted and nondisplaced left sacral ala fracture.    Pt is a 68 year old female admitted for public and sacral fracture after a fall sustained at home. Pt denies LOC with fall.  Pain consulted on 12/19 for management of left hip/buttock pain and other chronic pain.  Pt reports that she recently spent 6 weeks at [facility] for treatment of a ruptured appendix with surgical/medical management.  States that post hopitalization that she was participating in PT sessions at home to improve the strength in her LEs.  States that she was improving well however she "tripped over a cord while getting out of bed" last Sunday.  Pt seen and examined at bedside sitting up while eating breakfast. Reports pain score7/10 at rest  SCALE USED: (1-10 VNRS). Pt describes pain as aching, deep radiating to left buttock.  Pain is alleviated by pain medication and rest and is exacerbated by movement.  Pt reports chronic cervical and lumbar spine pain and was previously being treated with LESI and oral analgesics for chronic pain.  Pt states that she was under the care of a physician at [facility] pain management in [place] however she stopped going to the doctor as she was not pleased with the services.  Pt also reports chronic left ankle pain s/p left ankle surgery in 2019. Pt states she is under the care of a different pain management provider with whom she is pleased and takes Percocet 5/325 mg every 4-6 hours at home with good pain relief. Verified on IStop.  States that she prefers not to take NSAIDS. Pt tolerating PO diet. Denies lethargy, chest pain, SOB, nausea, vomiting, constipation. Reports last BM 12/16. Patient stated goal for pain control: to be able to take deep breaths, get out of bed to chair and ambulate with tolerable pain control. Pt ambulates with rolling waker at home.    PAST MEDICAL & SURGICAL HISTORY:  Cervical disc disorder at C6-C7 level with radiculopathy  with ulnar nerve compression    Anxiety    Carpal tunnel syndrome of left wrist    Breast mass, left    Uterine fibroid    Cataract  bilateral eyes    HPV in female    Fibrocystic breast disease    Subclavian arterial stenosis  left side 2018    Foot fracture, left  2016, s/p pins/screws    Osteoporosis    Chronic pain    History of spinal fusion  cervical C5-C7 with metal hardware-2012 ( discectomy)    S/P carpal tunnel release  left-2015    S/P decompression of ulnar nerve at elbow  left -2015 ( done with carpal tunnel release)    S/P lumpectomy, left breast  2010    S/P hysterectomy  2008    H/O bilateral breast reduction surgery  2002    S/P cataract surgery, right  with artificial lenses- November 2016    S/P tonsillectomy    S/P cataract surgery, left    S/P ORIF (open reduction internal fixation) fracture  left foot    FAMILY HISTORY:  Family history of non-Hodgkin's lymphoma (Mother)    Acute myocardial infarction (Father)    Social History:   [X] Denies ETOH use, illicit drug use and smoking    Allergies  Avelox (Other)  NSAIDs (Other; Stomach Upset)  penicillin (Other)    MEDICATIONS  (STANDING):  clonazePAM  Tablet 1 milliGRAM(s) Oral at bedtime  cyclobenzaprine 5 milliGRAM(s) Oral three times a day  enoxaparin Injectable 30 milliGRAM(s) SubCutaneous every 24 hours  influenza  Vaccine (HIGH DOSE) 0.7 milliLiter(s) IntraMuscular once  oxycodone    5 mG/acetaminophen 325 mG 1 Tablet(s) Oral every 6 hours  valsartan 40 milliGRAM(s) Oral daily    MEDICATIONS  (PRN):  acetaminophen     Tablet .. 650 milliGRAM(s) Oral every 6 hours PRN Temp greater or equal to 38C (100.4F), Mild Pain (1 - 3)  clonazePAM  Tablet 1 milliGRAM(s) Oral daily PRN anxiety  ketorolac   Injectable 15 milliGRAM(s) IV Push every 6 hours PRN Moderate Pain (4 - 6)  morphine  - Injectable 2 milliGRAM(s) IV Push every 6 hours PRN Severe Pain (7 - 10)  polyethylene glycol 3350 17 Gram(s) Oral daily PRN Constipation  senna 2 Tablet(s) Oral at bedtime PRN Constipation      Vital Signs Last 24 Hrs  T(C): 36.2 (19 Dec 2023 04:40), Max: 36.7 (18 Dec 2023 20:20)  T(F): 97.2 (19 Dec 2023 04:40), Max: 98 (18 Dec 2023 20:20)  HR: 82 (19 Dec 2023 04:40) (82 - 98)  BP: 150/87 (19 Dec 2023 04:40) (92/60 - 150/87)  BP(mean): 108 (19 Dec 2023 04:40) (70 - 108)  RR: 17 (19 Dec 2023 04:40) (17 - 18)  SpO2: 97% (19 Dec 2023 04:40) (97% - 98%)    Parameters below as of 19 Dec 2023 04:40  Patient On (Oxygen Delivery Method): room air    LABS: Reviewed.                          9.9    5.65  )-----------( 162      ( 19 Dec 2023 07:30 )             29.9     12-19    138  |  107  |  9   ----------------------------<  89  3.8   |  27  |  0.50    Ca    8.5      19 Dec 2023 07:30  Phos  3.5     12-19  Mg     2.2     12-19    TPro  6.5  /  Alb  3.0<L>  /  TBili  0.6  /  DBili  x   /  AST  20  /  ALT  24  /  AlkPhos  57  12-19    PT/INR - ( 18 Dec 2023 07:07 )   PT: 11.6 sec;   INR: 1.02 ratio       PTT - ( 18 Dec 2023 07:07 )  PTT:28.0 sec  LIVER FUNCTIONS - ( 19 Dec 2023 07:30 )  Alb: 3.0 g/dL / Pro: 6.5 g/dL / ALK PHOS: 57 U/L / ALT: 24 U/L DA / AST: 20 U/L / GGT: x           Urinalysis Basic - ( 19 Dec 2023 07:30 )    Color: x / Appearance: x / SG: x / pH: x  Gluc: 89 mg/dL / Ketone: x  / Bili: x / Urobili: x   Blood: x / Protein: x / Nitrite: x   Leuk Esterase: x / RBC: x / WBC x   Sq Epi: x / Non Sq Epi: x / Bacteria: x      Radiology: Reviewed.  < from: CT Pelvis Bony Only No Cont (12.18.23 @ 06:05) >    ACC: [id number] EXAM:  CT PELVIS BONY ONLY   ORDERED BY: ARISTEO MALLORY     PROCEDURE DATE:  12/18/2023      INTERPRETATION:  CLINICAL INFORMATION: Left hip pain.    TECHNIQUE: CT of the pelvis was performed in bone and soft tissue windows   withcoronal and sagittal reformats. No intravenous contrast was   administered. 3-D reformats were performed on a separate workstation.    COMPARISON: CT of the abdomen and pelvis from 10/7/2021.    FINDINGS:    Bone: An acute comminuted and mildly displaced fracture of the left   superior pubic ramus is seen extending into the left pubic symphysis and   left inferior pubic ramus. An acute comminuted and nondisplaced left   sacral ala fracture is noted. An old right superior and inferior pubic   ramus fractures are seen. No additional fracture or dislocation is   demonstrated.    Soft tissues: Mild subcutaneous inflammatory change is seen in the   lateral left hip. Enlargement of the left adductor musculature is noted   likely due to muscle strain and/or trauma.    Miscellaneous: Colonic diverticulosis is noted without diverticulitis.   The patient is status post a supracervical hysterectomy. No adnexal   masses are present. Urinary bladder is distended. Mild inflammatory   change is seen in the inferior left pelvis superior to the left superior   pubic ramus fracture.    IMPRESSION:    1. Acute comminuted and mildly displaced fracture of the left superior   pubic ramus extending into the left pubic symphysis and left inferior   pubic ramus.  2. Acute comminuted and nondisplaced left sacral ala fracture.    --- End of Report ---    GRAZYNA BAÑUELOS MD; Attending Radiologist  This document has been electronically signed. Dec 18 2023  6:17AM    < end of copied text >    ORT Score -   Family Hx of substance abuse	Female	      Male  Alcohol 	                                           1                     3  Illegal drugs	                                   2                     3  Rx drugs                                           4 	                  4  Personal Hx of substance abuse		  Alcohol 	                                          3	                  3  Illegal drugs                                     4	                  4  Rx drugs                                            5 	                  5  Age between 16- 45 years	           1                     1  hx preadolescent sexual abuse	   3 	                  0  Psychological disease		  ADD, OCD, bipolar, schizophrenia   2	          2  Depression                                           1 	          1  Total: 1    a score of 3 or lower indicates low risk for opioid abuse		  a score of 4-7 indicates moderate risk for opioid abuse		  a score of 8 or higher indicates high risk for opioid abuse  	  REVIEW OF SYSTEMS:  CONSTITUTIONAL: No fever or fatigue  HEENT:  No difficulty hearing, no change in vision  NECK: + chronic neck pain, no neck stiffness  RESPIRATORY: No cough, wheezing, chills or hemoptysis; No shortness of breath  CARDIOVASCULAR: No chest pain, palpitations, dizziness, or leg swelling  GASTROINTESTINAL: No loss of appetite, + decreased PO intake. No abdominal or epigastric pain. No nausea, vomiting; No diarrhea or constipation.   GENITOURINARY: No dysuria, frequency, hematuria, retention or incontinence  MUSCULOSKELETAL: + left hip pain that radiates to left buttock. + tight sensation in left buttock. + bruising on left hip area,  no swelling; + c/o chronic lumbar back pain with history of LESI, + C spine surgery in 2013, + left hip pain with movements, no right upper or right lower motor strength weakness, no saddle anesthesia, bowel/bladder incontinence, + fall, + left upper extremity numbness with + paresthesias  NEURO: No headaches, + LUE numbness with tingling, + left ankle burning pain chronic pain, + weakness in LLE  PSYCHIATRIC: + history of depression    PHYSICAL EXAM:  GENERAL:  Alert & Oriented X 4, cooperative, NAD, Good concentration. Speech is clear.   RESPIRATORY: Respirations even and unlabored. Clear to auscultation bilaterally; No rales, rhonchi, wheezing, or rubs  CARDIOVASCULAR: Normal S1/S2, regular rate and rhythm; No murmurs, rubs, or gallops. No JVD.   GASTROINTESTINAL:  Soft, Nontender, Nondistended; Bowel sounds present  PERIPHERAL VASCULAR:  Extremities warm without edema. 2+ Peripheral Pulses, No cyanosis, No calf tenderness  MUSCULOSKELETAL: Motor Strength 5/5 B/L upper and RLE, 3/5 left lower extremity;  ROM intact in Both upper extremities and right LE, + ROM LLE with c/o pain with movement; + SLR at 40 degress in LLE; + tenderness on palpation of left hip, left pubic   SKIN: Warm, dry, intact. No rashes, lesions, + ecchymosis in left hip    Risk factors associated with adverse outcomes related to opioid treatment  [X]  Concurrent benzodiazepine use  [ ]  History/ Active substance use or alcohol use disorder  [X] Psychiatric co-morbidity  [ ] Sleep apnea  [ ] COPD  [ ] BMI> 35  [ ] Liver dysfunction  [ ] Renal dysfunction  [ ] CHF  [ ] Smoker  [X]  Age > 60 years    [X]  [place]  Reviewed and Copied to Chart. See below.    Plan of care and goal oriented pain management treatment options were discussed with patient and /or primary care giver; all questions and concerns were addressed and care was aligned with patient's wishes.    Educated patient on goal oriented pain management treatment options      Source of information: ROULA PASTOR, Chart review  Patient language: English  : n/a    HPI:  68 yrs old F, from home, ambulating independently, pmhx HTN, anxiety, pshx appendectomy, presented with back and hip pain s/p mechanical fall. Pt stated that she is unsure of how she has fallen down, did not trip but likely when turning lost balance and hit the wall or the floor. Pt denied chest pain, palpitation, dyspnea, dizziness, visual changes, abdominal pain, N/V/D, dysuria, fever, chills, unilateral weakness or decreased sensation however has been generally weak lately s/p appendectomy and was on the physical therapy at home for regaining her strength. Pt denied saddle anesthesia, urinary or bladder incontinence however endorsed inability to urinate despite the urge.   Pt denied alcohol consumption, smoking or use of illicit drugs. She endorsed getting Morphine after the surgery which was prescribed however no longer taking.  (18 Dec 2023 08:53)    Patient is a 68y old  Female who presents with a chief complaint of pubic and sacral fracture (19 Dec 2023 11:47)    CT pelvis 12/18 demonstrated acute comminuted and mildly displaced fracture of the left superior pubic ramus extending into the left pubic symphysis and left inferior pubic ramus.  Acute comminuted and nondisplaced left sacral ala fracture.    Pt is a 68 year old female admitted for public and sacral fracture after a fall sustained at home. Pt denies LOC with fall.  Pain consulted on 12/19 for management of left hip/buttock pain and other chronic pain.  Pt reports that she recently spent 6 weeks at [facility] for treatment of a ruptured appendix with surgical/medical management.  States that post hopitalization that she was participating in PT sessions at home to improve the strength in her LEs.  States that she was improving well however she "tripped over a cord while getting out of bed" last Sunday.  Pt seen and examined at bedside sitting up while eating breakfast. Reports pain score7/10 at rest  SCALE USED: (1-10 VNRS). Pt describes pain as aching, deep radiating to left buttock.  Pain is alleviated by pain medication and rest and is exacerbated by movement.  Pt reports chronic cervical and lumbar spine pain and was previously being treated with LESI and oral analgesics for chronic pain.  Pt states that she was under the care of a physician at [facility] pain management in [place] however she stopped going to the doctor as she was not pleased with the services.  Pt also reports chronic left ankle pain s/p left ankle surgery in 2019. Pt states she is under the care of a different pain management provider with whom she is pleased and takes Percocet 5/325 mg every 4-6 hours at home with good pain relief. Verified on IStop.  States that she prefers not to take NSAIDS. Pt tolerating PO diet. Denies lethargy, chest pain, SOB, nausea, vomiting, constipation. Reports last BM 12/16. Patient stated goal for pain control: to be able to take deep breaths, get out of bed to chair and ambulate with tolerable pain control. Pt ambulates with rolling waker at home.    PAST MEDICAL & SURGICAL HISTORY:  Cervical disc disorder at C6-C7 level with radiculopathy  with ulnar nerve compression    Anxiety    Carpal tunnel syndrome of left wrist    Breast mass, left    Uterine fibroid    Cataract  bilateral eyes    HPV in female    Fibrocystic breast disease    Subclavian arterial stenosis  left side 2018    Foot fracture, left  2016, s/p pins/screws    Osteoporosis    Chronic pain    History of spinal fusion  cervical C5-C7 with metal hardware-2012 ( discectomy)    S/P carpal tunnel release  left-2015    S/P decompression of ulnar nerve at elbow  left -2015 ( done with carpal tunnel release)    S/P lumpectomy, left breast  2010    S/P hysterectomy  2008    H/O bilateral breast reduction surgery  2002    S/P cataract surgery, right  with artificial lenses- November 2016    S/P tonsillectomy    S/P cataract surgery, left    S/P ORIF (open reduction internal fixation) fracture  left foot    FAMILY HISTORY:  Family history of non-Hodgkin's lymphoma (Mother)    Acute myocardial infarction (Father)    Social History:   [X] Denies ETOH use, illicit drug use and smoking    Allergies  Avelox (Other)  NSAIDs (Other; Stomach Upset)  penicillin (Other)    MEDICATIONS  (STANDING):  clonazePAM  Tablet 1 milliGRAM(s) Oral at bedtime  cyclobenzaprine 5 milliGRAM(s) Oral three times a day  enoxaparin Injectable 30 milliGRAM(s) SubCutaneous every 24 hours  influenza  Vaccine (HIGH DOSE) 0.7 milliLiter(s) IntraMuscular once  oxycodone    5 mG/acetaminophen 325 mG 1 Tablet(s) Oral every 6 hours  valsartan 40 milliGRAM(s) Oral daily    MEDICATIONS  (PRN):  acetaminophen     Tablet .. 650 milliGRAM(s) Oral every 6 hours PRN Temp greater or equal to 38C (100.4F), Mild Pain (1 - 3)  clonazePAM  Tablet 1 milliGRAM(s) Oral daily PRN anxiety  ketorolac   Injectable 15 milliGRAM(s) IV Push every 6 hours PRN Moderate Pain (4 - 6)  morphine  - Injectable 2 milliGRAM(s) IV Push every 6 hours PRN Severe Pain (7 - 10)  polyethylene glycol 3350 17 Gram(s) Oral daily PRN Constipation  senna 2 Tablet(s) Oral at bedtime PRN Constipation      Vital Signs Last 24 Hrs  T(C): 36.2 (19 Dec 2023 04:40), Max: 36.7 (18 Dec 2023 20:20)  T(F): 97.2 (19 Dec 2023 04:40), Max: 98 (18 Dec 2023 20:20)  HR: 82 (19 Dec 2023 04:40) (82 - 98)  BP: 150/87 (19 Dec 2023 04:40) (92/60 - 150/87)  BP(mean): 108 (19 Dec 2023 04:40) (70 - 108)  RR: 17 (19 Dec 2023 04:40) (17 - 18)  SpO2: 97% (19 Dec 2023 04:40) (97% - 98%)    Parameters below as of 19 Dec 2023 04:40  Patient On (Oxygen Delivery Method): room air    LABS: Reviewed.                          9.9    5.65  )-----------( 162      ( 19 Dec 2023 07:30 )             29.9     12-19    138  |  107  |  9   ----------------------------<  89  3.8   |  27  |  0.50    Ca    8.5      19 Dec 2023 07:30  Phos  3.5     12-19  Mg     2.2     12-19    TPro  6.5  /  Alb  3.0<L>  /  TBili  0.6  /  DBili  x   /  AST  20  /  ALT  24  /  AlkPhos  57  12-19    PT/INR - ( 18 Dec 2023 07:07 )   PT: 11.6 sec;   INR: 1.02 ratio       PTT - ( 18 Dec 2023 07:07 )  PTT:28.0 sec  LIVER FUNCTIONS - ( 19 Dec 2023 07:30 )  Alb: 3.0 g/dL / Pro: 6.5 g/dL / ALK PHOS: 57 U/L / ALT: 24 U/L DA / AST: 20 U/L / GGT: x           Urinalysis Basic - ( 19 Dec 2023 07:30 )    Color: x / Appearance: x / SG: x / pH: x  Gluc: 89 mg/dL / Ketone: x  / Bili: x / Urobili: x   Blood: x / Protein: x / Nitrite: x   Leuk Esterase: x / RBC: x / WBC x   Sq Epi: x / Non Sq Epi: x / Bacteria: x      Radiology: Reviewed.  < from: CT Pelvis Bony Only No Cont (12.18.23 @ 06:05) >    ACC: [id number] EXAM:  CT PELVIS BONY ONLY   ORDERED BY: ARISTEO MALLORY     PROCEDURE DATE:  12/18/2023      INTERPRETATION:  CLINICAL INFORMATION: Left hip pain.    TECHNIQUE: CT of the pelvis was performed in bone and soft tissue windows   withcoronal and sagittal reformats. No intravenous contrast was   administered. 3-D reformats were performed on a separate workstation.    COMPARISON: CT of the abdomen and pelvis from 10/7/2021.    FINDINGS:    Bone: An acute comminuted and mildly displaced fracture of the left   superior pubic ramus is seen extending into the left pubic symphysis and   left inferior pubic ramus. An acute comminuted and nondisplaced left   sacral ala fracture is noted. An old right superior and inferior pubic   ramus fractures are seen. No additional fracture or dislocation is   demonstrated.    Soft tissues: Mild subcutaneous inflammatory change is seen in the   lateral left hip. Enlargement of the left adductor musculature is noted   likely due to muscle strain and/or trauma.    Miscellaneous: Colonic diverticulosis is noted without diverticulitis.   The patient is status post a supracervical hysterectomy. No adnexal   masses are present. Urinary bladder is distended. Mild inflammatory   change is seen in the inferior left pelvis superior to the left superior   pubic ramus fracture.    IMPRESSION:    1. Acute comminuted and mildly displaced fracture of the left superior   pubic ramus extending into the left pubic symphysis and left inferior   pubic ramus.  2. Acute comminuted and nondisplaced left sacral ala fracture.    --- End of Report ---    GRAZYNA BAÑUELOS MD; Attending Radiologist  This document has been electronically signed. Dec 18 2023  6:17AM    < end of copied text >    ORT Score -   Family Hx of substance abuse	Female	      Male  Alcohol 	                                           1                     3  Illegal drugs	                                   2                     3  Rx drugs                                           4 	                  4  Personal Hx of substance abuse		  Alcohol 	                                          3	                  3  Illegal drugs                                     4	                  4  Rx drugs                                            5 	                  5  Age between 16- 45 years	           1                     1  hx preadolescent sexual abuse	   3 	                  0  Psychological disease		  ADD, OCD, bipolar, schizophrenia   2	          2  Depression                                           1 	          1  Total: 1    a score of 3 or lower indicates low risk for opioid abuse		  a score of 4-7 indicates moderate risk for opioid abuse		  a score of 8 or higher indicates high risk for opioid abuse  	  REVIEW OF SYSTEMS:  CONSTITUTIONAL: No fever or fatigue  HEENT:  No difficulty hearing, no change in vision  NECK: + chronic neck pain, no neck stiffness  RESPIRATORY: No cough, wheezing, chills or hemoptysis; No shortness of breath  CARDIOVASCULAR: No chest pain, palpitations, dizziness, or leg swelling  GASTROINTESTINAL: No loss of appetite, + decreased PO intake. No abdominal or epigastric pain. No nausea, vomiting; No diarrhea or constipation.   GENITOURINARY: No dysuria, frequency, hematuria, retention or incontinence  MUSCULOSKELETAL: + left hip pain that radiates to left buttock. + tight sensation in left buttock. + bruising on left hip area,  no swelling; + c/o chronic lumbar back pain with history of LESI, + C spine surgery in 2013, + left hip pain with movements, no right upper or right lower motor strength weakness, no saddle anesthesia, bowel/bladder incontinence, + fall, + left upper extremity numbness with + paresthesias  NEURO: No headaches, + LUE numbness with tingling, + left ankle burning pain chronic pain, + weakness in LLE  PSYCHIATRIC: + history of depression    PHYSICAL EXAM:  GENERAL:  Alert & Oriented X 4, cooperative, NAD, Good concentration. Speech is clear.   RESPIRATORY: Respirations even and unlabored. Clear to auscultation bilaterally; No rales, rhonchi, wheezing, or rubs  CARDIOVASCULAR: Normal S1/S2, regular rate and rhythm; No murmurs, rubs, or gallops. No JVD.   GASTROINTESTINAL:  Soft, Nontender, Nondistended; Bowel sounds present  PERIPHERAL VASCULAR:  Extremities warm without edema. 2+ Peripheral Pulses, No cyanosis, No calf tenderness  MUSCULOSKELETAL: Motor Strength 5/5 B/L upper and RLE, 3/5 left lower extremity;  ROM intact in Both upper extremities and right LE, + ROM LLE with c/o pain with movement; + SLR at 40 degress in LLE; + tenderness on palpation of left hip, left pubic   SKIN: Warm, dry, intact. No rashes, lesions, + ecchymosis in left hip    Risk factors associated with adverse outcomes related to opioid treatment  [X]  Concurrent benzodiazepine use  [ ]  History/ Active substance use or alcohol use disorder  [X] Psychiatric co-morbidity  [ ] Sleep apnea  [ ] COPD  [ ] BMI> 35  [ ] Liver dysfunction  [ ] Renal dysfunction  [ ] CHF  [ ] Smoker  [X]  Age > 60 years    [X]  [place]  Reviewed and Copied to Chart. See below.    Plan of care and goal oriented pain management treatment options were discussed with patient and /or primary care giver; all questions and concerns were addressed and care was aligned with patient's wishes.    Educated patient on goal oriented pain management treatment options      Source of information: ROULA PASTOR, Chart review  Patient language: English  : n/a    HPI:  68 yrs old F, from home, ambulating independently, pmhx HTN, anxiety, pshx appendectomy, presented with back and hip pain s/p mechanical fall. Pt stated that she is unsure of how she has fallen down, did not trip but likely when turning lost balance and hit the wall or the floor. Pt denied chest pain, palpitation, dyspnea, dizziness, visual changes, abdominal pain, N/V/D, dysuria, fever, chills, unilateral weakness or decreased sensation however has been generally weak lately s/p appendectomy and was on the physical therapy at home for regaining her strength. Pt denied saddle anesthesia, urinary or bladder incontinence however endorsed inability to urinate despite the urge.   Pt denied alcohol consumption, smoking or use of illicit drugs. She endorsed getting Morphine after the surgery which was prescribed however no longer taking.  (18 Dec 2023 08:53)    Patient is a 68y old  Female who presents with a chief complaint of pubic and sacral fracture (19 Dec 2023 11:47)    CT pelvis 12/18 demonstrated acute comminuted and mildly displaced fracture of the left superior pubic ramus extending into the left pubic symphysis and left inferior pubic ramus.  Acute comminuted and nondisplaced left sacral ala fracture.    Pt is a 68 year old female admitted for public and sacral fracture after a fall sustained at home. Pt denies LOC with fall.  Pain consulted on 12/19 for management of left hip/buttock pain and other chronic pain.  Pt reports that she recently spent 6 weeks at [facility] for treatment of a ruptured appendix with surgical/medical management.  States that post hopitalization that she was participating in PT sessions at home to improve the strength in her LEs.  States that she was improving well however she "tripped over a cord while getting out of bed" last Sunday.  Pt seen and examined at bedside sitting up while eating breakfast. Reports pain score7/10 at rest  SCALE USED: (1-10 VNRS). Pt describes pain as aching, deep radiating to left buttock.  Pain is alleviated by pain medication and rest and is exacerbated by movement.  Pt reports chronic cervical and lumbar spine pain and was previously being treated with LESI and oral analgesics for chronic pain.  Pt states that she was under the care of a physician at [facility] pain management in [place] however she stopped going to the doctor as she was not pleased with the services.  Pt also reports chronic left ankle pain s/p left ankle surgery in 2019. Pt states she is under the care of a different pain management provider with whom she is pleased and takes Percocet 5/325 mg every 4-6 hours at home with good pain relief. Verified on IStop.  States that she prefers not to take NSAIDS. Pt tolerating PO diet. Denies lethargy, chest pain, SOB, nausea, vomiting, constipation. Reports last BM 12/16. Patient stated goal for pain control: to be able to take deep breaths, get out of bed to chair and ambulate with tolerable pain control. Pt ambulates with rolling waker at home.    PAST MEDICAL & SURGICAL HISTORY:  Cervical disc disorder at C6-C7 level with radiculopathy  with ulnar nerve compression    Anxiety    Carpal tunnel syndrome of left wrist    Breast mass, left    Uterine fibroid    Cataract  bilateral eyes    HPV in female    Fibrocystic breast disease    Subclavian arterial stenosis  left side 2018    Foot fracture, left  2016, s/p pins/screws    Osteoporosis    Chronic pain    History of spinal fusion  cervical C5-C7 with metal hardware-2012 ( discectomy)    S/P carpal tunnel release  left-2015    S/P decompression of ulnar nerve at elbow  left -2015 ( done with carpal tunnel release)    S/P lumpectomy, left breast  2010    S/P hysterectomy  2008    H/O bilateral breast reduction surgery  2002    S/P cataract surgery, right  with artificial lenses- November 2016    S/P tonsillectomy    S/P cataract surgery, left    S/P ORIF (open reduction internal fixation) fracture  left foot    FAMILY HISTORY:  Family history of non-Hodgkin's lymphoma (Mother)    Acute myocardial infarction (Father)    Social History:   [X] Denies ETOH use, illicit drug use and smoking    Allergies  Avelox (Other)  NSAIDs (Other; Stomach Upset)  penicillin (Other)    MEDICATIONS  (STANDING):  clonazePAM  Tablet 1 milliGRAM(s) Oral at bedtime  cyclobenzaprine 5 milliGRAM(s) Oral three times a day  enoxaparin Injectable 30 milliGRAM(s) SubCutaneous every 24 hours  influenza  Vaccine (HIGH DOSE) 0.7 milliLiter(s) IntraMuscular once  oxycodone    5 mG/acetaminophen 325 mG 1 Tablet(s) Oral every 6 hours  valsartan 40 milliGRAM(s) Oral daily    MEDICATIONS  (PRN):  acetaminophen     Tablet .. 650 milliGRAM(s) Oral every 6 hours PRN Temp greater or equal to 38C (100.4F), Mild Pain (1 - 3)  clonazePAM  Tablet 1 milliGRAM(s) Oral daily PRN anxiety  ketorolac   Injectable 15 milliGRAM(s) IV Push every 6 hours PRN Moderate Pain (4 - 6)  morphine  - Injectable 2 milliGRAM(s) IV Push every 6 hours PRN Severe Pain (7 - 10)  polyethylene glycol 3350 17 Gram(s) Oral daily PRN Constipation  senna 2 Tablet(s) Oral at bedtime PRN Constipation      Vital Signs Last 24 Hrs  T(C): 36.2 (19 Dec 2023 04:40), Max: 36.7 (18 Dec 2023 20:20)  T(F): 97.2 (19 Dec 2023 04:40), Max: 98 (18 Dec 2023 20:20)  HR: 82 (19 Dec 2023 04:40) (82 - 98)  BP: 150/87 (19 Dec 2023 04:40) (92/60 - 150/87)  BP(mean): 108 (19 Dec 2023 04:40) (70 - 108)  RR: 17 (19 Dec 2023 04:40) (17 - 18)  SpO2: 97% (19 Dec 2023 04:40) (97% - 98%)    Parameters below as of 19 Dec 2023 04:40  Patient On (Oxygen Delivery Method): room air    LABS: Reviewed.                          9.9    5.65  )-----------( 162      ( 19 Dec 2023 07:30 )             29.9     12-19    138  |  107  |  9   ----------------------------<  89  3.8   |  27  |  0.50    Ca    8.5      19 Dec 2023 07:30  Phos  3.5     12-19  Mg     2.2     12-19    TPro  6.5  /  Alb  3.0<L>  /  TBili  0.6  /  DBili  x   /  AST  20  /  ALT  24  /  AlkPhos  57  12-19    PT/INR - ( 18 Dec 2023 07:07 )   PT: 11.6 sec;   INR: 1.02 ratio       PTT - ( 18 Dec 2023 07:07 )  PTT:28.0 sec  LIVER FUNCTIONS - ( 19 Dec 2023 07:30 )  Alb: 3.0 g/dL / Pro: 6.5 g/dL / ALK PHOS: 57 U/L / ALT: 24 U/L DA / AST: 20 U/L / GGT: x           Urinalysis Basic - ( 19 Dec 2023 07:30 )    Color: x / Appearance: x / SG: x / pH: x  Gluc: 89 mg/dL / Ketone: x  / Bili: x / Urobili: x   Blood: x / Protein: x / Nitrite: x   Leuk Esterase: x / RBC: x / WBC x   Sq Epi: x / Non Sq Epi: x / Bacteria: x      Radiology: Reviewed.  < from: CT Pelvis Bony Only No Cont (12.18.23 @ 06:05) >    ACC: [id number] EXAM:  CT PELVIS BONY ONLY   ORDERED BY: ARISTEO MALLORY     PROCEDURE DATE:  12/18/2023      INTERPRETATION:  CLINICAL INFORMATION: Left hip pain.    TECHNIQUE: CT of the pelvis was performed in bone and soft tissue windows   withcoronal and sagittal reformats. No intravenous contrast was   administered. 3-D reformats were performed on a separate workstation.    COMPARISON: CT of the abdomen and pelvis from 10/7/2021.    FINDINGS:    Bone: An acute comminuted and mildly displaced fracture of the left   superior pubic ramus is seen extending into the left pubic symphysis and   left inferior pubic ramus. An acute comminuted and nondisplaced left   sacral ala fracture is noted. An old right superior and inferior pubic   ramus fractures are seen. No additional fracture or dislocation is   demonstrated.    Soft tissues: Mild subcutaneous inflammatory change is seen in the   lateral left hip. Enlargement of the left adductor musculature is noted   likely due to muscle strain and/or trauma.    Miscellaneous: Colonic diverticulosis is noted without diverticulitis.   The patient is status post a supracervical hysterectomy. No adnexal   masses are present. Urinary bladder is distended. Mild inflammatory   change is seen in the inferior left pelvis superior to the left superior   pubic ramus fracture.    IMPRESSION:    1. Acute comminuted and mildly displaced fracture of the left superior   pubic ramus extending into the left pubic symphysis and left inferior   pubic ramus.  2. Acute comminuted and nondisplaced left sacral ala fracture.    --- End of Report ---    GRAZYNA BAÑUELOS MD; Attending Radiologist  This document has been electronically signed. Dec 18 2023  6:17AM    < end of copied text >    ORT Score -   Family Hx of substance abuse	Female	      Male  Alcohol 	                                           1                     3  Illegal drugs	                                   2                     3  Rx drugs                                           4 	                  4  Personal Hx of substance abuse		  Alcohol 	                                          3	                  3  Illegal drugs                                     4	                  4  Rx drugs                                            5 	                  5  Age between 16- 45 years	           1                     1  hx preadolescent sexual abuse	   3 	                  0  Psychological disease		  ADD, OCD, bipolar, schizophrenia   2	          2  Depression                                           1 	          1  Total: 1    a score of 3 or lower indicates low risk for opioid abuse		  a score of 4-7 indicates moderate risk for opioid abuse		  a score of 8 or higher indicates high risk for opioid abuse  	  REVIEW OF SYSTEMS:  CONSTITUTIONAL: No fever or fatigue  HEENT:  No difficulty hearing, no change in vision  NECK: + chronic neck pain, no neck stiffness  RESPIRATORY: No cough, wheezing, chills or hemoptysis; No shortness of breath  CARDIOVASCULAR: No chest pain, palpitations, dizziness, or leg swelling  GASTROINTESTINAL: No loss of appetite, + decreased PO intake. No abdominal or epigastric pain. No nausea, vomiting; No diarrhea or constipation.   GENITOURINARY: No dysuria, frequency, hematuria, retention or incontinence  MUSCULOSKELETAL: + left hip pain that radiates to left buttock. + tight sensation in left buttock. + bruising on left hip area,  no swelling; + c/o chronic lumbar back pain with history of LESI, + C spine surgery in 2013, + left hip pain with movements, no right upper or right lower motor strength weakness, no saddle anesthesia, bowel/bladder incontinence, + fall, + left upper extremity numbness with + paresthesias  NEURO: No headaches, + LUE numbness with tingling, + left ankle burning pain chronic pain, + weakness in LLE  PSYCHIATRIC: + history of anxiety/depression    PHYSICAL EXAM:  GENERAL:  Alert & Oriented X 4, cooperative, NAD, Good concentration. Speech is clear.   RESPIRATORY: Respirations even and unlabored. Clear to auscultation bilaterally; No rales, rhonchi, wheezing, or rubs  CARDIOVASCULAR: Normal S1/S2, regular rate and rhythm; No murmurs, rubs, or gallops. No JVD.   GASTROINTESTINAL:  Soft, Nontender, Nondistended; Bowel sounds present  PERIPHERAL VASCULAR:  Extremities warm without edema. 2+ Peripheral Pulses, No cyanosis, No calf tenderness  MUSCULOSKELETAL: Motor Strength 5/5 B/L upper and RLE, 3/5 left lower extremity;  ROM intact in Both upper extremities and right LE, + ROM LLE with c/o pain with movement; + SLR at 40 degress in LLE; + tenderness on palpation of left hip, left pubic   SKIN: Warm, dry, intact. No rashes, lesions, + ecchymosis in left hip    Risk factors associated with adverse outcomes related to opioid treatment  [X]  Concurrent benzodiazepine use  [ ]  History/ Active substance use or alcohol use disorder  [X] Psychiatric co-morbidity  [ ] Sleep apnea  [ ] COPD  [ ] BMI> 35  [ ] Liver dysfunction  [ ] Renal dysfunction  [ ] CHF  [ ] Smoker  [X]  Age > 60 years    [X]  [place]  Reviewed and Copied to Chart. See below.    Plan of care and goal oriented pain management treatment options were discussed with patient and /or primary care giver; all questions and concerns were addressed and care was aligned with patient's wishes.    Educated patient on goal oriented pain management treatment options      Source of information: ROULA PASTOR, Chart review  Patient language: English  : n/a    HPI:  68 yrs old F, from home, ambulating independently, pmhx HTN, anxiety, pshx appendectomy, presented with back and hip pain s/p mechanical fall. Pt stated that she is unsure of how she has fallen down, did not trip but likely when turning lost balance and hit the wall or the floor. Pt denied chest pain, palpitation, dyspnea, dizziness, visual changes, abdominal pain, N/V/D, dysuria, fever, chills, unilateral weakness or decreased sensation however has been generally weak lately s/p appendectomy and was on the physical therapy at home for regaining her strength. Pt denied saddle anesthesia, urinary or bladder incontinence however endorsed inability to urinate despite the urge.   Pt denied alcohol consumption, smoking or use of illicit drugs. She endorsed getting Morphine after the surgery which was prescribed however no longer taking.  (18 Dec 2023 08:53)    Patient is a 68y old  Female who presents with a chief complaint of pubic and sacral fracture (19 Dec 2023 11:47)    CT pelvis 12/18 demonstrated acute comminuted and mildly displaced fracture of the left superior pubic ramus extending into the left pubic symphysis and left inferior pubic ramus.  Acute comminuted and nondisplaced left sacral ala fracture.    Pt is a 68 year old female admitted for public and sacral fracture after a fall sustained at home. Pt denies LOC with fall.  Pain consulted on 12/19 for management of left hip/buttock pain and other chronic pain.  Pt reports that she recently spent 6 weeks at [facility] for treatment of a ruptured appendix with surgical/medical management.  States that post hopitalization that she was participating in PT sessions at home to improve the strength in her LEs.  States that she was improving well however she "tripped over a cord while getting out of bed" last Sunday.  Pt seen and examined at bedside sitting up while eating breakfast. Reports pain score7/10 at rest  SCALE USED: (1-10 VNRS). Pt describes pain as aching, deep radiating to left buttock.  Pain is alleviated by pain medication and rest and is exacerbated by movement.  Pt reports chronic cervical and lumbar spine pain and was previously being treated with LESI and oral analgesics for chronic pain.  Pt states that she was under the care of a physician at [facility] pain management in [place] however she stopped going to the doctor as she was not pleased with the services.  Pt also reports chronic left ankle pain s/p left ankle surgery in 2019. Pt states she is under the care of a different pain management provider with whom she is pleased and takes Percocet 5/325 mg every 4-6 hours at home with good pain relief. Verified on IStop.  States that she prefers not to take NSAIDS. Pt tolerating PO diet. Denies lethargy, chest pain, SOB, nausea, vomiting, constipation. Reports last BM 12/16. Patient stated goal for pain control: to be able to take deep breaths, get out of bed to chair and ambulate with tolerable pain control. Pt ambulates with rolling waker at home.    PAST MEDICAL & SURGICAL HISTORY:  Cervical disc disorder at C6-C7 level with radiculopathy  with ulnar nerve compression    Anxiety    Carpal tunnel syndrome of left wrist    Breast mass, left    Uterine fibroid    Cataract  bilateral eyes    HPV in female    Fibrocystic breast disease    Subclavian arterial stenosis  left side 2018    Foot fracture, left  2016, s/p pins/screws    Osteoporosis    Chronic pain    History of spinal fusion  cervical C5-C7 with metal hardware-2012 ( discectomy)    S/P carpal tunnel release  left-2015    S/P decompression of ulnar nerve at elbow  left -2015 ( done with carpal tunnel release)    S/P lumpectomy, left breast  2010    S/P hysterectomy  2008    H/O bilateral breast reduction surgery  2002    S/P cataract surgery, right  with artificial lenses- November 2016    S/P tonsillectomy    S/P cataract surgery, left    S/P ORIF (open reduction internal fixation) fracture  left foot    FAMILY HISTORY:  Family history of non-Hodgkin's lymphoma (Mother)    Acute myocardial infarction (Father)    Social History:   [X] Denies ETOH use, illicit drug use and smoking    Allergies  Avelox (Other)  NSAIDs (Other; Stomach Upset)  penicillin (Other)    MEDICATIONS  (STANDING):  clonazePAM  Tablet 1 milliGRAM(s) Oral at bedtime  cyclobenzaprine 5 milliGRAM(s) Oral three times a day  enoxaparin Injectable 30 milliGRAM(s) SubCutaneous every 24 hours  influenza  Vaccine (HIGH DOSE) 0.7 milliLiter(s) IntraMuscular once  oxycodone    5 mG/acetaminophen 325 mG 1 Tablet(s) Oral every 6 hours  valsartan 40 milliGRAM(s) Oral daily    MEDICATIONS  (PRN):  acetaminophen     Tablet .. 650 milliGRAM(s) Oral every 6 hours PRN Temp greater or equal to 38C (100.4F), Mild Pain (1 - 3)  clonazePAM  Tablet 1 milliGRAM(s) Oral daily PRN anxiety  ketorolac   Injectable 15 milliGRAM(s) IV Push every 6 hours PRN Moderate Pain (4 - 6)  morphine  - Injectable 2 milliGRAM(s) IV Push every 6 hours PRN Severe Pain (7 - 10)  polyethylene glycol 3350 17 Gram(s) Oral daily PRN Constipation  senna 2 Tablet(s) Oral at bedtime PRN Constipation      Vital Signs Last 24 Hrs  T(C): 36.2 (19 Dec 2023 04:40), Max: 36.7 (18 Dec 2023 20:20)  T(F): 97.2 (19 Dec 2023 04:40), Max: 98 (18 Dec 2023 20:20)  HR: 82 (19 Dec 2023 04:40) (82 - 98)  BP: 150/87 (19 Dec 2023 04:40) (92/60 - 150/87)  BP(mean): 108 (19 Dec 2023 04:40) (70 - 108)  RR: 17 (19 Dec 2023 04:40) (17 - 18)  SpO2: 97% (19 Dec 2023 04:40) (97% - 98%)    Parameters below as of 19 Dec 2023 04:40  Patient On (Oxygen Delivery Method): room air    LABS: Reviewed.                          9.9    5.65  )-----------( 162      ( 19 Dec 2023 07:30 )             29.9     12-19    138  |  107  |  9   ----------------------------<  89  3.8   |  27  |  0.50    Ca    8.5      19 Dec 2023 07:30  Phos  3.5     12-19  Mg     2.2     12-19    TPro  6.5  /  Alb  3.0<L>  /  TBili  0.6  /  DBili  x   /  AST  20  /  ALT  24  /  AlkPhos  57  12-19    PT/INR - ( 18 Dec 2023 07:07 )   PT: 11.6 sec;   INR: 1.02 ratio       PTT - ( 18 Dec 2023 07:07 )  PTT:28.0 sec  LIVER FUNCTIONS - ( 19 Dec 2023 07:30 )  Alb: 3.0 g/dL / Pro: 6.5 g/dL / ALK PHOS: 57 U/L / ALT: 24 U/L DA / AST: 20 U/L / GGT: x           Urinalysis Basic - ( 19 Dec 2023 07:30 )    Color: x / Appearance: x / SG: x / pH: x  Gluc: 89 mg/dL / Ketone: x  / Bili: x / Urobili: x   Blood: x / Protein: x / Nitrite: x   Leuk Esterase: x / RBC: x / WBC x   Sq Epi: x / Non Sq Epi: x / Bacteria: x      Radiology: Reviewed.  < from: CT Pelvis Bony Only No Cont (12.18.23 @ 06:05) >    ACC: [id number] EXAM:  CT PELVIS BONY ONLY   ORDERED BY: ARISTEO MALLORY     PROCEDURE DATE:  12/18/2023      INTERPRETATION:  CLINICAL INFORMATION: Left hip pain.    TECHNIQUE: CT of the pelvis was performed in bone and soft tissue windows   withcoronal and sagittal reformats. No intravenous contrast was   administered. 3-D reformats were performed on a separate workstation.    COMPARISON: CT of the abdomen and pelvis from 10/7/2021.    FINDINGS:    Bone: An acute comminuted and mildly displaced fracture of the left   superior pubic ramus is seen extending into the left pubic symphysis and   left inferior pubic ramus. An acute comminuted and nondisplaced left   sacral ala fracture is noted. An old right superior and inferior pubic   ramus fractures are seen. No additional fracture or dislocation is   demonstrated.    Soft tissues: Mild subcutaneous inflammatory change is seen in the   lateral left hip. Enlargement of the left adductor musculature is noted   likely due to muscle strain and/or trauma.    Miscellaneous: Colonic diverticulosis is noted without diverticulitis.   The patient is status post a supracervical hysterectomy. No adnexal   masses are present. Urinary bladder is distended. Mild inflammatory   change is seen in the inferior left pelvis superior to the left superior   pubic ramus fracture.    IMPRESSION:    1. Acute comminuted and mildly displaced fracture of the left superior   pubic ramus extending into the left pubic symphysis and left inferior   pubic ramus.  2. Acute comminuted and nondisplaced left sacral ala fracture.    --- End of Report ---    GRAZYNA BAÑUELOS MD; Attending Radiologist  This document has been electronically signed. Dec 18 2023  6:17AM    < end of copied text >    ORT Score -   Family Hx of substance abuse	Female	      Male  Alcohol 	                                           1                     3  Illegal drugs	                                   2                     3  Rx drugs                                           4 	                  4  Personal Hx of substance abuse		  Alcohol 	                                          3	                  3  Illegal drugs                                     4	                  4  Rx drugs                                            5 	                  5  Age between 16- 45 years	           1                     1  hx preadolescent sexual abuse	   3 	                  0  Psychological disease		  ADD, OCD, bipolar, schizophrenia   2	          2  Depression                                           1 	          1  Total: 1    a score of 3 or lower indicates low risk for opioid abuse		  a score of 4-7 indicates moderate risk for opioid abuse		  a score of 8 or higher indicates high risk for opioid abuse  	  REVIEW OF SYSTEMS:  CONSTITUTIONAL: No fever or fatigue  HEENT:  No difficulty hearing, no change in vision  NECK: + chronic neck pain, no neck stiffness  RESPIRATORY: No cough, wheezing, chills or hemoptysis; No shortness of breath  CARDIOVASCULAR: No chest pain, palpitations, dizziness, or leg swelling  GASTROINTESTINAL: No loss of appetite, + decreased PO intake. No abdominal or epigastric pain. No nausea, vomiting; No diarrhea or constipation.   GENITOURINARY: No dysuria, frequency, hematuria, retention or incontinence  MUSCULOSKELETAL: + left hip pain that radiates to left buttock. + tight sensation in left buttock. + bruising on left hip area,  no swelling; + c/o chronic lumbar back pain with history of LESI, + C spine surgery in 2013, + left hip pain with movements, no right upper or right lower motor strength weakness, no saddle anesthesia, bowel/bladder incontinence, + fall, + left upper extremity numbness with + paresthesias  NEURO: No headaches, + LUE numbness with tingling, + left ankle burning pain chronic pain, + weakness in LLE  PSYCHIATRIC: + history of anxiety/depression    PHYSICAL EXAM:  GENERAL:  Alert & Oriented X 4, cooperative, NAD, Good concentration. Speech is clear.   RESPIRATORY: Respirations even and unlabored. Clear to auscultation bilaterally; No rales, rhonchi, wheezing, or rubs  CARDIOVASCULAR: Normal S1/S2, regular rate and rhythm; No murmurs, rubs, or gallops. No JVD.   GASTROINTESTINAL:  Soft, Nontender, Nondistended; Bowel sounds present  PERIPHERAL VASCULAR:  Extremities warm without edema. 2+ Peripheral Pulses, No cyanosis, No calf tenderness  MUSCULOSKELETAL: Motor Strength 5/5 B/L upper and RLE, 3/5 left lower extremity;  ROM intact in Both upper extremities and right LE, + ROM LLE with c/o pain with movement; + SLR at 40 degress in LLE; + tenderness on palpation of left hip, left pubic   SKIN: Warm, dry, intact. No rashes, lesions, + ecchymosis in left hip    Risk factors associated with adverse outcomes related to opioid treatment  [X]  Concurrent benzodiazepine use  [ ]  History/ Active substance use or alcohol use disorder  [X] Psychiatric co-morbidity  [ ] Sleep apnea  [ ] COPD  [ ] BMI> 35  [ ] Liver dysfunction  [ ] Renal dysfunction  [ ] CHF  [ ] Smoker  [X]  Age > 60 years    [X]  [place]  Reviewed and Copied to Chart. See below.    Plan of care and goal oriented pain management treatment options were discussed with patient and /or primary care giver; all questions and concerns were addressed and care was aligned with patient's wishes.    Educated patient on goal oriented pain management treatment options      Source of information: ROULA PASTOR, Chart review  Patient language: English  : n/a    HPI:  68 yrs old F, from home, ambulating independently, pmhx HTN, anxiety, pshx appendectomy, presented with back and hip pain s/p mechanical fall. Pt stated that she is unsure of how she has fallen down, did not trip but likely when turning lost balance and hit the wall or the floor. Pt denied chest pain, palpitation, dyspnea, dizziness, visual changes, abdominal pain, N/V/D, dysuria, fever, chills, unilateral weakness or decreased sensation however has been generally weak lately s/p appendectomy and was on the physical therapy at home for regaining her strength. Pt denied saddle anesthesia, urinary or bladder incontinence however endorsed inability to urinate despite the urge.   Pt denied alcohol consumption, smoking or use of illicit drugs. She endorsed getting Morphine after the surgery which was prescribed however no longer taking.  (18 Dec 2023 08:53)    Patient is a 68y old  Female who presents with a chief complaint of pubic and sacral fracture (19 Dec 2023 11:47)    CT pelvis 12/18 demonstrated acute comminuted and mildly displaced fracture of the left superior pubic ramus extending into the left pubic symphysis and left inferior pubic ramus.  Acute comminuted and nondisplaced left sacral ala fracture.    Pt is a 68 year old female admitted for public and sacral fracture after a fall sustained at home. Pt denies LOC with fall.  Pain consulted on 12/19 for management of left hip/buttock pain and other chronic pain.  Pt reports that she recently spent 6 weeks at [facility] for treatment of a ruptured appendix with surgical/medical management.  States that post hopitalization that she was participating in PT sessions at home to improve the strength in her LEs.  States that she was improving well however she "tripped over a cord while getting out of bed" last Sunday.  Pt seen and examined at bedside sitting up while eating breakfast. Reports pain score7/10 at rest  SCALE USED: (1-10 VNRS). Pt describes pain as aching, deep radiating to left buttock.  Pain is alleviated by pain medication and rest and is exacerbated by movement.  Pt reports chronic cervical and lumbar spine pain and was previously being treated with LESI and oral analgesics for chronic pain.  Pt states that she was under the care of a physician at [facility] pain management in [place] however she stopped going to the doctor as she was not pleased with the services.  Pt also reports chronic left ankle pain s/p left ankle surgery in 2019. Pt states she is under the care of a different pain management provider with whom she is pleased and takes Percocet 5/325 mg every 4-6 hours at home with good pain relief. Verified on IStop.  States that she prefers not to take NSAIDS. Pt tolerating PO diet. Denies lethargy, chest pain, SOB, nausea, vomiting, constipation. Reports last BM 12/16. Patient stated goal for pain control: to be able to take deep breaths, get out of bed to chair and ambulate with tolerable pain control. Pt ambulates with rolling waker at home.    PAST MEDICAL & SURGICAL HISTORY:  Cervical disc disorder at C6-C7 level with radiculopathy  with ulnar nerve compression    Anxiety    Carpal tunnel syndrome of left wrist    Breast mass, left    Uterine fibroid    Cataract  bilateral eyes    HPV in female    Fibrocystic breast disease    Subclavian arterial stenosis  left side 2018    Foot fracture, left  2016, s/p pins/screws    Osteoporosis    Chronic pain    History of spinal fusion  cervical C5-C7 with metal hardware-2012 ( discectomy)    S/P carpal tunnel release  left-2015    S/P decompression of ulnar nerve at elbow  left -2015 ( done with carpal tunnel release)    S/P lumpectomy, left breast  2010    S/P hysterectomy  2008    H/O bilateral breast reduction surgery  2002    S/P cataract surgery, right  with artificial lenses- November 2016    S/P tonsillectomy    S/P cataract surgery, left    S/P ORIF (open reduction internal fixation) fracture  left foot    FAMILY HISTORY:  Family history of non-Hodgkin's lymphoma (Mother)    Acute myocardial infarction (Father)    Social History:   [X] Denies ETOH use, illicit drug use and smoking - quit smoking recently    Allergies  Avelox (Other)  NSAIDs (Other; Stomach Upset)  penicillin (Other)    MEDICATIONS  (STANDING):  clonazePAM  Tablet 1 milliGRAM(s) Oral at bedtime  cyclobenzaprine 5 milliGRAM(s) Oral three times a day  enoxaparin Injectable 30 milliGRAM(s) SubCutaneous every 24 hours  influenza  Vaccine (HIGH DOSE) 0.7 milliLiter(s) IntraMuscular once  oxycodone    5 mG/acetaminophen 325 mG 1 Tablet(s) Oral every 6 hours  valsartan 40 milliGRAM(s) Oral daily    MEDICATIONS  (PRN):  acetaminophen     Tablet .. 650 milliGRAM(s) Oral every 6 hours PRN Temp greater or equal to 38C (100.4F), Mild Pain (1 - 3)  clonazePAM  Tablet 1 milliGRAM(s) Oral daily PRN anxiety  ketorolac   Injectable 15 milliGRAM(s) IV Push every 6 hours PRN Moderate Pain (4 - 6)  morphine  - Injectable 2 milliGRAM(s) IV Push every 6 hours PRN Severe Pain (7 - 10)  polyethylene glycol 3350 17 Gram(s) Oral daily PRN Constipation  senna 2 Tablet(s) Oral at bedtime PRN Constipation      Vital Signs Last 24 Hrs  T(C): 36.2 (19 Dec 2023 04:40), Max: 36.7 (18 Dec 2023 20:20)  T(F): 97.2 (19 Dec 2023 04:40), Max: 98 (18 Dec 2023 20:20)  HR: 82 (19 Dec 2023 04:40) (82 - 98)  BP: 150/87 (19 Dec 2023 04:40) (92/60 - 150/87)  BP(mean): 108 (19 Dec 2023 04:40) (70 - 108)  RR: 17 (19 Dec 2023 04:40) (17 - 18)  SpO2: 97% (19 Dec 2023 04:40) (97% - 98%)    Parameters below as of 19 Dec 2023 04:40  Patient On (Oxygen Delivery Method): room air    LABS: Reviewed.                          9.9    5.65  )-----------( 162      ( 19 Dec 2023 07:30 )             29.9     12-19    138  |  107  |  9   ----------------------------<  89  3.8   |  27  |  0.50    Ca    8.5      19 Dec 2023 07:30  Phos  3.5     12-19  Mg     2.2     12-19    TPro  6.5  /  Alb  3.0<L>  /  TBili  0.6  /  DBili  x   /  AST  20  /  ALT  24  /  AlkPhos  57  12-19    PT/INR - ( 18 Dec 2023 07:07 )   PT: 11.6 sec;   INR: 1.02 ratio       PTT - ( 18 Dec 2023 07:07 )  PTT:28.0 sec  LIVER FUNCTIONS - ( 19 Dec 2023 07:30 )  Alb: 3.0 g/dL / Pro: 6.5 g/dL / ALK PHOS: 57 U/L / ALT: 24 U/L DA / AST: 20 U/L / GGT: x           Urinalysis Basic - ( 19 Dec 2023 07:30 )    Color: x / Appearance: x / SG: x / pH: x  Gluc: 89 mg/dL / Ketone: x  / Bili: x / Urobili: x   Blood: x / Protein: x / Nitrite: x   Leuk Esterase: x / RBC: x / WBC x   Sq Epi: x / Non Sq Epi: x / Bacteria: x      Radiology: Reviewed.  < from: CT Pelvis Bony Only No Cont (12.18.23 @ 06:05) >    ACC: [id number] EXAM:  CT PELVIS BONY ONLY   ORDERED BY: ARISTEO MALLORY     PROCEDURE DATE:  12/18/2023      INTERPRETATION:  CLINICAL INFORMATION: Left hip pain.    TECHNIQUE: CT of the pelvis was performed in bone and soft tissue windows   withcoronal and sagittal reformats. No intravenous contrast was   administered. 3-D reformats were performed on a separate workstation.    COMPARISON: CT of the abdomen and pelvis from 10/7/2021.    FINDINGS:    Bone: An acute comminuted and mildly displaced fracture of the left   superior pubic ramus is seen extending into the left pubic symphysis and   left inferior pubic ramus. An acute comminuted and nondisplaced left   sacral ala fracture is noted. An old right superior and inferior pubic   ramus fractures are seen. No additional fracture or dislocation is   demonstrated.    Soft tissues: Mild subcutaneous inflammatory change is seen in the   lateral left hip. Enlargement of the left adductor musculature is noted   likely due to muscle strain and/or trauma.    Miscellaneous: Colonic diverticulosis is noted without diverticulitis.   The patient is status post a supracervical hysterectomy. No adnexal   masses are present. Urinary bladder is distended. Mild inflammatory   change is seen in the inferior left pelvis superior to the left superior   pubic ramus fracture.    IMPRESSION:    1. Acute comminuted and mildly displaced fracture of the left superior   pubic ramus extending into the left pubic symphysis and left inferior   pubic ramus.  2. Acute comminuted and nondisplaced left sacral ala fracture.    --- End of Report ---    GRAZYNA BAÑUELOS MD; Attending Radiologist  This document has been electronically signed. Dec 18 2023  6:17AM    < end of copied text >    ORT Score -   Family Hx of substance abuse	Female	      Male  Alcohol 	                                           1                     3  Illegal drugs	                                   2                     3  Rx drugs                                           4 	                  4  Personal Hx of substance abuse		  Alcohol 	                                          3	                  3  Illegal drugs                                     4	                  4  Rx drugs                                            5 	                  5  Age between 16- 45 years	           1                     1  hx preadolescent sexual abuse	   3 	                  0  Psychological disease		  ADD, OCD, bipolar, schizophrenia   2	          2  Depression                                           1 	          1  Total: 1    a score of 3 or lower indicates low risk for opioid abuse		  a score of 4-7 indicates moderate risk for opioid abuse		  a score of 8 or higher indicates high risk for opioid abuse  	  REVIEW OF SYSTEMS:  CONSTITUTIONAL: No fever or fatigue  HEENT:  No difficulty hearing, no change in vision  NECK: + chronic neck pain, no neck stiffness  RESPIRATORY: No cough, wheezing, chills or hemoptysis; No shortness of breath  CARDIOVASCULAR: No chest pain, palpitations, dizziness, or leg swelling  GASTROINTESTINAL: No loss of appetite, + decreased PO intake. No abdominal or epigastric pain. No nausea, vomiting; No diarrhea or constipation.   GENITOURINARY: No dysuria, frequency, hematuria, retention or incontinence  MUSCULOSKELETAL: + left hip pain that radiates to left buttock. + tight sensation in left buttock. + bruising on left hip area,  no swelling; + c/o chronic lumbar back pain with history of LESI, + C spine surgery in 2013, + left hip pain with movements, no right upper or right lower motor strength weakness, no saddle anesthesia, bowel/bladder incontinence, + fall, + left upper extremity numbness with + paresthesias  NEURO: No headaches, + LUE numbness with tingling, + left ankle burning pain chronic pain, + weakness in LLE  PSYCHIATRIC: + history of anxiety/depression    PHYSICAL EXAM:  GENERAL:  Alert & Oriented X 4, cooperative, NAD, Good concentration. Speech is clear.   RESPIRATORY: Respirations even and unlabored. Clear to auscultation bilaterally; No rales, rhonchi, wheezing, or rubs  CARDIOVASCULAR: Normal S1/S2, regular rate and rhythm; No murmurs, rubs, or gallops. No JVD.   GASTROINTESTINAL:  Soft, Nontender, Nondistended; Bowel sounds present  PERIPHERAL VASCULAR:  Extremities warm without edema. 2+ Peripheral Pulses, No cyanosis, No calf tenderness  MUSCULOSKELETAL: Motor Strength 5/5 B/L upper and RLE, 3/5 left lower extremity;  ROM intact in Both upper extremities and right LE, + ROM LLE with c/o pain with movement; + SLR at 40 degress in LLE; + tenderness on palpation of left hip, left pubic   SKIN: Warm, dry, intact. No rashes, lesions, + ecchymosis in left hip    Risk factors associated with adverse outcomes related to opioid treatment  [X]  Concurrent benzodiazepine use  [ ]  History/ Active substance use or alcohol use disorder  [X] Psychiatric co-morbidity  [ ] Sleep apnea  [ ] COPD  [ ] BMI> 35  [ ] Liver dysfunction  [ ] Renal dysfunction  [ ] CHF  [ ] Smoker  [X]  Age > 60 years    [X]  [place]  Reviewed and Copied to Chart. See below.    Plan of care and goal oriented pain management treatment options were discussed with patient and /or primary care giver; all questions and concerns were addressed and care was aligned with patient's wishes.    Educated patient on goal oriented pain management treatment options      Source of information: ROULA PASTOR, Chart review  Patient language: English  : n/a    HPI:  68 yrs old F, from home, ambulating independently, pmhx HTN, anxiety, pshx appendectomy, presented with back and hip pain s/p mechanical fall. Pt stated that she is unsure of how she has fallen down, did not trip but likely when turning lost balance and hit the wall or the floor. Pt denied chest pain, palpitation, dyspnea, dizziness, visual changes, abdominal pain, N/V/D, dysuria, fever, chills, unilateral weakness or decreased sensation however has been generally weak lately s/p appendectomy and was on the physical therapy at home for regaining her strength. Pt denied saddle anesthesia, urinary or bladder incontinence however endorsed inability to urinate despite the urge.   Pt denied alcohol consumption, smoking or use of illicit drugs. She endorsed getting Morphine after the surgery which was prescribed however no longer taking.  (18 Dec 2023 08:53)    Patient is a 68y old  Female who presents with a chief complaint of pubic and sacral fracture (19 Dec 2023 11:47)    CT pelvis 12/18 demonstrated acute comminuted and mildly displaced fracture of the left superior pubic ramus extending into the left pubic symphysis and left inferior pubic ramus.  Acute comminuted and nondisplaced left sacral ala fracture.    Pt is a 68 year old female admitted for public and sacral fracture after a fall sustained at home. Pt denies LOC with fall.  Pain consulted on 12/19 for management of left hip/buttock pain and other chronic pain.  Pt reports that she recently spent 6 weeks at [facility] for treatment of a ruptured appendix with surgical/medical management.  States that post hopitalization that she was participating in PT sessions at home to improve the strength in her LEs.  States that she was improving well however she "tripped over a cord while getting out of bed" last Sunday.  Pt seen and examined at bedside sitting up while eating breakfast. Reports pain score7/10 at rest  SCALE USED: (1-10 VNRS). Pt describes pain as aching, deep radiating to left buttock.  Pain is alleviated by pain medication and rest and is exacerbated by movement.  Pt reports chronic cervical and lumbar spine pain and was previously being treated with LESI and oral analgesics for chronic pain.  Pt states that she was under the care of a physician at [facility] pain management in [place] however she stopped going to the doctor as she was not pleased with the services.  Pt also reports chronic left ankle pain s/p left ankle surgery in 2019. Pt states she is under the care of a different pain management provider with whom she is pleased and takes Percocet 5/325 mg every 4-6 hours at home with good pain relief. Verified on IStop.  States that she prefers not to take NSAIDS. Pt tolerating PO diet. Denies lethargy, chest pain, SOB, nausea, vomiting, constipation. Reports last BM 12/16. Patient stated goal for pain control: to be able to take deep breaths, get out of bed to chair and ambulate with tolerable pain control. Pt ambulates with rolling waker at home.    PAST MEDICAL & SURGICAL HISTORY:  Cervical disc disorder at C6-C7 level with radiculopathy  with ulnar nerve compression    Anxiety    Carpal tunnel syndrome of left wrist    Breast mass, left    Uterine fibroid    Cataract  bilateral eyes    HPV in female    Fibrocystic breast disease    Subclavian arterial stenosis  left side 2018    Foot fracture, left  2016, s/p pins/screws    Osteoporosis    Chronic pain    History of spinal fusion  cervical C5-C7 with metal hardware-2012 ( discectomy)    S/P carpal tunnel release  left-2015    S/P decompression of ulnar nerve at elbow  left -2015 ( done with carpal tunnel release)    S/P lumpectomy, left breast  2010    S/P hysterectomy  2008    H/O bilateral breast reduction surgery  2002    S/P cataract surgery, right  with artificial lenses- November 2016    S/P tonsillectomy    S/P cataract surgery, left    S/P ORIF (open reduction internal fixation) fracture  left foot    FAMILY HISTORY:  Family history of non-Hodgkin's lymphoma (Mother)    Acute myocardial infarction (Father)    Social History:   [X] Denies ETOH use, illicit drug use and smoking - quit smoking recently    Allergies  Avelox (Other)  NSAIDs (Other; Stomach Upset)  penicillin (Other)    MEDICATIONS  (STANDING):  clonazePAM  Tablet 1 milliGRAM(s) Oral at bedtime  cyclobenzaprine 5 milliGRAM(s) Oral three times a day  enoxaparin Injectable 30 milliGRAM(s) SubCutaneous every 24 hours  influenza  Vaccine (HIGH DOSE) 0.7 milliLiter(s) IntraMuscular once  oxycodone    5 mG/acetaminophen 325 mG 1 Tablet(s) Oral every 6 hours  valsartan 40 milliGRAM(s) Oral daily    MEDICATIONS  (PRN):  acetaminophen     Tablet .. 650 milliGRAM(s) Oral every 6 hours PRN Temp greater or equal to 38C (100.4F), Mild Pain (1 - 3)  clonazePAM  Tablet 1 milliGRAM(s) Oral daily PRN anxiety  ketorolac   Injectable 15 milliGRAM(s) IV Push every 6 hours PRN Moderate Pain (4 - 6)  morphine  - Injectable 2 milliGRAM(s) IV Push every 6 hours PRN Severe Pain (7 - 10)  polyethylene glycol 3350 17 Gram(s) Oral daily PRN Constipation  senna 2 Tablet(s) Oral at bedtime PRN Constipation      Vital Signs Last 24 Hrs  T(C): 36.2 (19 Dec 2023 04:40), Max: 36.7 (18 Dec 2023 20:20)  T(F): 97.2 (19 Dec 2023 04:40), Max: 98 (18 Dec 2023 20:20)  HR: 82 (19 Dec 2023 04:40) (82 - 98)  BP: 150/87 (19 Dec 2023 04:40) (92/60 - 150/87)  BP(mean): 108 (19 Dec 2023 04:40) (70 - 108)  RR: 17 (19 Dec 2023 04:40) (17 - 18)  SpO2: 97% (19 Dec 2023 04:40) (97% - 98%)    Parameters below as of 19 Dec 2023 04:40  Patient On (Oxygen Delivery Method): room air    LABS: Reviewed.                          9.9    5.65  )-----------( 162      ( 19 Dec 2023 07:30 )             29.9     12-19    138  |  107  |  9   ----------------------------<  89  3.8   |  27  |  0.50    Ca    8.5      19 Dec 2023 07:30  Phos  3.5     12-19  Mg     2.2     12-19    TPro  6.5  /  Alb  3.0<L>  /  TBili  0.6  /  DBili  x   /  AST  20  /  ALT  24  /  AlkPhos  57  12-19    PT/INR - ( 18 Dec 2023 07:07 )   PT: 11.6 sec;   INR: 1.02 ratio       PTT - ( 18 Dec 2023 07:07 )  PTT:28.0 sec  LIVER FUNCTIONS - ( 19 Dec 2023 07:30 )  Alb: 3.0 g/dL / Pro: 6.5 g/dL / ALK PHOS: 57 U/L / ALT: 24 U/L DA / AST: 20 U/L / GGT: x           Urinalysis Basic - ( 19 Dec 2023 07:30 )    Color: x / Appearance: x / SG: x / pH: x  Gluc: 89 mg/dL / Ketone: x  / Bili: x / Urobili: x   Blood: x / Protein: x / Nitrite: x   Leuk Esterase: x / RBC: x / WBC x   Sq Epi: x / Non Sq Epi: x / Bacteria: x      Radiology: Reviewed.  < from: CT Pelvis Bony Only No Cont (12.18.23 @ 06:05) >    ACC: [id number] EXAM:  CT PELVIS BONY ONLY   ORDERED BY: ARISTEO MALLORY     PROCEDURE DATE:  12/18/2023      INTERPRETATION:  CLINICAL INFORMATION: Left hip pain.    TECHNIQUE: CT of the pelvis was performed in bone and soft tissue windows   withcoronal and sagittal reformats. No intravenous contrast was   administered. 3-D reformats were performed on a separate workstation.    COMPARISON: CT of the abdomen and pelvis from 10/7/2021.    FINDINGS:    Bone: An acute comminuted and mildly displaced fracture of the left   superior pubic ramus is seen extending into the left pubic symphysis and   left inferior pubic ramus. An acute comminuted and nondisplaced left   sacral ala fracture is noted. An old right superior and inferior pubic   ramus fractures are seen. No additional fracture or dislocation is   demonstrated.    Soft tissues: Mild subcutaneous inflammatory change is seen in the   lateral left hip. Enlargement of the left adductor musculature is noted   likely due to muscle strain and/or trauma.    Miscellaneous: Colonic diverticulosis is noted without diverticulitis.   The patient is status post a supracervical hysterectomy. No adnexal   masses are present. Urinary bladder is distended. Mild inflammatory   change is seen in the inferior left pelvis superior to the left superior   pubic ramus fracture.    IMPRESSION:    1. Acute comminuted and mildly displaced fracture of the left superior   pubic ramus extending into the left pubic symphysis and left inferior   pubic ramus.  2. Acute comminuted and nondisplaced left sacral ala fracture.    --- End of Report ---    GRAZYNA BAÑUELOS MD; Attending Radiologist  This document has been electronically signed. Dec 18 2023  6:17AM    < end of copied text >    ORT Score -   Family Hx of substance abuse	Female	      Male  Alcohol 	                                           1                     3  Illegal drugs	                                   2                     3  Rx drugs                                           4 	                  4  Personal Hx of substance abuse		  Alcohol 	                                          3	                  3  Illegal drugs                                     4	                  4  Rx drugs                                            5 	                  5  Age between 16- 45 years	           1                     1  hx preadolescent sexual abuse	   3 	                  0  Psychological disease		  ADD, OCD, bipolar, schizophrenia   2	          2  Depression                                           1 	          1  Total: 1    a score of 3 or lower indicates low risk for opioid abuse		  a score of 4-7 indicates moderate risk for opioid abuse		  a score of 8 or higher indicates high risk for opioid abuse  	  REVIEW OF SYSTEMS:  CONSTITUTIONAL: No fever or fatigue  HEENT:  No difficulty hearing, no change in vision  NECK: + chronic neck pain, no neck stiffness  RESPIRATORY: No cough, wheezing, chills or hemoptysis; No shortness of breath  CARDIOVASCULAR: No chest pain, palpitations, dizziness, or leg swelling  GASTROINTESTINAL: No loss of appetite, + decreased PO intake. No abdominal or epigastric pain. No nausea, vomiting; No diarrhea or constipation.   GENITOURINARY: No dysuria, frequency, hematuria, retention or incontinence  MUSCULOSKELETAL: + left hip pain that radiates to left buttock. + tight sensation in left buttock. + bruising on left hip area,  no swelling; + c/o chronic lumbar back pain with history of LESI, + C spine surgery in 2013, + left hip pain with movements, no right upper or right lower motor strength weakness, no saddle anesthesia, bowel/bladder incontinence, + fall, + left upper extremity numbness with + paresthesias  NEURO: No headaches, + LUE numbness with tingling, + left ankle burning pain chronic pain, + weakness in LLE  PSYCHIATRIC: + history of anxiety/depression    PHYSICAL EXAM:  GENERAL:  Alert & Oriented X 4, cooperative, NAD, Good concentration. Speech is clear.   RESPIRATORY: Respirations even and unlabored. Clear to auscultation bilaterally; No rales, rhonchi, wheezing, or rubs  CARDIOVASCULAR: Normal S1/S2, regular rate and rhythm; No murmurs, rubs, or gallops. No JVD.   GASTROINTESTINAL:  Soft, Nontender, Nondistended; Bowel sounds present  PERIPHERAL VASCULAR:  Extremities warm without edema. 2+ Peripheral Pulses, No cyanosis, No calf tenderness  MUSCULOSKELETAL: Motor Strength 5/5 B/L upper and RLE, 3/5 left lower extremity;  ROM intact in Both upper extremities and right LE, + ROM LLE with c/o pain with movement; + SLR at 40 degress in LLE; + tenderness on palpation of left hip, left pubic   SKIN: Warm, dry, intact. No rashes, lesions, + ecchymosis in left hip    Risk factors associated with adverse outcomes related to opioid treatment  [X]  Concurrent benzodiazepine use  [ ]  History/ Active substance use or alcohol use disorder  [X] Psychiatric co-morbidity  [ ] Sleep apnea  [ ] COPD  [ ] BMI> 35  [ ] Liver dysfunction  [ ] Renal dysfunction  [ ] CHF  [ ] Smoker  [X]  Age > 60 years    [X]  [place]  Reviewed and Copied to Chart. See below.    Plan of care and goal oriented pain management treatment options were discussed with patient and /or primary care giver; all questions and concerns were addressed and care was aligned with patient's wishes.    Educated patient on goal oriented pain management treatment options

## 2023-12-19 NOTE — PROGRESS NOTE ADULT - SUBJECTIVE AND OBJECTIVE BOX
Patient is a 68y old  Female who presents with a chief complaint of pubic and sacral fracture (18 Dec 2023 17:04)      INTERVAL HPI/OVERNIGHT EVENTS: No acute events overnight     REVIEW OF SYSTEMS:  CONSTITUTIONAL: No fever, chills  ENMT:  No difficulty hearing, no change in vision  NECK: No pain or stiffness  RESPIRATORY: No cough, SOB  CARDIOVASCULAR: No chest pain, palpitations  GASTROINTESTINAL: No abdominal pain. No nausea, vomiting, or diarrhea  GENITOURINARY: No dysuria  NEUROLOGICAL: No HA  SKIN: No itching, burning, rashes, or lesions   LYMPH NODES: No enlarged glands  ENDOCRINE: No heat or cold intolerance; No hair loss  MUSCULOSKELETAL: No joint pain or swelling; No muscle, back, or extremity pain  PSYCHIATRIC: No depression, anxiety  HEME/LYMPH: No easy bruising, or bleeding gums    T(C): 36.2 (12-19-23 @ 04:40), Max: 36.7 (12-18-23 @ 20:20)  HR: 82 (12-19-23 @ 04:40) (82 - 98)  BP: 150/87 (12-19-23 @ 04:40) (92/60 - 150/87)  RR: 17 (12-19-23 @ 04:40) (17 - 18)  SpO2: 97% (12-19-23 @ 04:40) (97% - 98%)  Wt(kg): --Vital Signs Last 24 Hrs  T(C): 36.2 (19 Dec 2023 04:40), Max: 36.7 (18 Dec 2023 20:20)  T(F): 97.2 (19 Dec 2023 04:40), Max: 98 (18 Dec 2023 20:20)  HR: 82 (19 Dec 2023 04:40) (82 - 98)  BP: 150/87 (19 Dec 2023 04:40) (92/60 - 150/87)  BP(mean): 108 (19 Dec 2023 04:40) (70 - 108)  RR: 17 (19 Dec 2023 04:40) (17 - 18)  SpO2: 97% (19 Dec 2023 04:40) (97% - 98%)    Parameters below as of 19 Dec 2023 04:40  Patient On (Oxygen Delivery Method): room air    MEDICATIONS  (STANDING):  clonazePAM  Tablet 1 milliGRAM(s) Oral at bedtime  cyclobenzaprine 5 milliGRAM(s) Oral three times a day  enoxaparin Injectable 30 milliGRAM(s) SubCutaneous every 24 hours  influenza  Vaccine (HIGH DOSE) 0.7 milliLiter(s) IntraMuscular once  oxycodone    5 mG/acetaminophen 325 mG 1 Tablet(s) Oral every 6 hours  valsartan 40 milliGRAM(s) Oral daily    MEDICATIONS  (PRN):  acetaminophen     Tablet .. 650 milliGRAM(s) Oral every 6 hours PRN Temp greater or equal to 38C (100.4F), Mild Pain (1 - 3)  clonazePAM  Tablet 1 milliGRAM(s) Oral daily PRN anxiety  ketorolac   Injectable 15 milliGRAM(s) IV Push every 6 hours PRN Moderate Pain (4 - 6)  morphine  - Injectable 2 milliGRAM(s) IV Push every 6 hours PRN Severe Pain (7 - 10)  polyethylene glycol 3350 17 Gram(s) Oral daily PRN Constipation  senna 2 Tablet(s) Oral at bedtime PRN Constipation        PHYSICAL EXAM:  GENERAL: NAD  EYES: clear conjunctiva; EOMI  ENMT: Moist mucous membranes  NECK: Supple, No JVD, Normal thyroid  CHEST/LUNG: Clear to auscultation bilaterally; No rales, rhonchi, wheezing, or rubs  HEART: S1, S2, Regular rate and rhythm  ABDOMEN: Soft, Nontender, Nondistended; Bowel sounds present  NEURO: Alert & Oriented X3  EXTREMITIES: No LE edema, no calf tenderness  LYMPH: No lymphadenopathy noted  SKIN: No rashes or lesions    Consultant(s) Notes Reviewed:  [x ] YES  [ ] NO  Care Discussed with Consultants/Other Providers [ x] YES  [ ] NO    LABS:                        9.9    5.65  )-----------( 162      ( 19 Dec 2023 07:30 )             29.9     12-19    138  |  107  |  9   ----------------------------<  89  3.8   |  27  |  0.50    Ca    8.5      19 Dec 2023 07:30  Phos  3.5     12-19  Mg     2.2     12-19    TPro  6.5  /  Alb  3.0<L>  /  TBili  0.6  /  DBili  x   /  AST  20  /  ALT  24  /  AlkPhos  57  12-19    PT/INR - ( 18 Dec 2023 07:07 )   PT: 11.6 sec;   INR: 1.02 ratio         PTT - ( 18 Dec 2023 07:07 )  PTT:28.0 sec  CAPILLARY BLOOD GLUCOSE      Urinalysis Basic - ( 19 Dec 2023 07:30 )    Color: x / Appearance: x / SG: x / pH: x  Gluc: 89 mg/dL / Ketone: x  / Bili: x / Urobili: x   Blood: x / Protein: x / Nitrite: x   Leuk Esterase: x / RBC: x / WBC x   Sq Epi: x / Non Sq Epi: x / Bacteria: x        RADIOLOGY & ADDITIONAL TESTS:    Imaging Personally Reviewed:  [ x] YES  [ ] NO  < from: CT Pelvis Bony Only No Cont (12.18.23 @ 06:05) >    ACC: [id number] EXAM:  CT PELVIS BONY ONLY   ORDERED BY: ARISTEO MALLORY     PROCEDURE DATE:  12/18/2023          INTERPRETATION:  CLINICAL INFORMATION: Left hip pain.    TECHNIQUE: CT of the pelvis was performed in bone and soft tissue windows   withcoronal and sagittal reformats. No intravenous contrast was   administered. 3-D reformats were performed on a separate workstation.    COMPARISON: CT of the abdomen and pelvis from 10/7/2021.    FINDINGS:    Bone: An acute comminuted and mildly displaced fracture of the left   superior pubic ramus is seen extending into the left pubic symphysis and   left inferior pubic ramus. An acute comminuted and nondisplaced left   sacral ala fracture is noted. An old right superior and inferior pubic   ramus fractures are seen. No additional fracture or dislocation is   demonstrated.    Soft tissues: Mild subcutaneous inflammatory change is seen in the   lateral left hip. Enlargement of the left adductor musculature is noted   likely due to muscle strain and/or trauma.    Miscellaneous: Colonic diverticulosis is noted without diverticulitis.   The patient is status post a supracervical hysterectomy. No adnexal   masses are present. Urinary bladder is distended. Mild inflammatory   change is seen in the inferior left pelvis superior to the left superior   pubic ramus fracture.    IMPRESSION:    1. Acute comminuted and mildly displaced fracture of the left superior   pubic ramus extending into the left pubic symphysis and left inferior   pubic ramus.  2. Acute comminuted and nondisplaced left sacral ala fracture.    --- End of Report ---            GRAZYNA BAÑUELOS MD; Attending Radiologist  This document has been electronically signed. Dec 18 2023  6:17AM    < end of copied text >       Patient is a 68y old  Female who presents with a chief complaint of pubic and sacral fracture (18 Dec 2023 17:04)      INTERVAL HPI/OVERNIGHT EVENTS: No acute events overnight     REVIEW OF SYSTEMS:  CONSTITUTIONAL: No fever, chills  ENMT:  No difficulty hearing, no change in vision  NECK: No pain or stiffness  RESPIRATORY: No cough, SOB  CARDIOVASCULAR: No chest pain, palpitations  GASTROINTESTINAL: No abdominal pain. No nausea, vomiting, or diarrhea  GENITOURINARY: No dysuria  NEUROLOGICAL: No HA  SKIN: No itching, burning, rashes, or lesions   LYMPH NODES: No enlarged glands  ENDOCRINE: No heat or cold intolerance; No hair loss  MUSCULOSKELETAL: No joint pain or swelling; No muscle, back, or extremity pain  PSYCHIATRIC: No depression, anxiety  HEME/LYMPH: No easy bruising, or bleeding gums    T(C): 36.2 (12-19-23 @ 04:40), Max: 36.7 (12-18-23 @ 20:20)  HR: 82 (12-19-23 @ 04:40) (82 - 98)  BP: 150/87 (12-19-23 @ 04:40) (92/60 - 150/87)  RR: 17 (12-19-23 @ 04:40) (17 - 18)  SpO2: 97% (12-19-23 @ 04:40) (97% - 98%)  Wt(kg): --Vital Signs Last 24 Hrs  T(C): 36.2 (19 Dec 2023 04:40), Max: 36.7 (18 Dec 2023 20:20)  T(F): 97.2 (19 Dec 2023 04:40), Max: 98 (18 Dec 2023 20:20)  HR: 82 (19 Dec 2023 04:40) (82 - 98)  BP: 150/87 (19 Dec 2023 04:40) (92/60 - 150/87)  BP(mean): 108 (19 Dec 2023 04:40) (70 - 108)  RR: 17 (19 Dec 2023 04:40) (17 - 18)  SpO2: 97% (19 Dec 2023 04:40) (97% - 98%)    Parameters below as of 19 Dec 2023 04:40  Patient On (Oxygen Delivery Method): room air    MEDICATIONS  (STANDING):  clonazePAM  Tablet 1 milliGRAM(s) Oral at bedtime  cyclobenzaprine 5 milliGRAM(s) Oral three times a day  enoxaparin Injectable 30 milliGRAM(s) SubCutaneous every 24 hours  influenza  Vaccine (HIGH DOSE) 0.7 milliLiter(s) IntraMuscular once  oxycodone    5 mG/acetaminophen 325 mG 1 Tablet(s) Oral every 6 hours  valsartan 40 milliGRAM(s) Oral daily    MEDICATIONS  (PRN):  acetaminophen     Tablet .. 650 milliGRAM(s) Oral every 6 hours PRN Temp greater or equal to 38C (100.4F), Mild Pain (1 - 3)  clonazePAM  Tablet 1 milliGRAM(s) Oral daily PRN anxiety  ketorolac   Injectable 15 milliGRAM(s) IV Push every 6 hours PRN Moderate Pain (4 - 6)  morphine  - Injectable 2 milliGRAM(s) IV Push every 6 hours PRN Severe Pain (7 - 10)  polyethylene glycol 3350 17 Gram(s) Oral daily PRN Constipation  senna 2 Tablet(s) Oral at bedtime PRN Constipation        PHYSICAL EXAM:  GENERAL: NAD  EYES: clear conjunctiva; EOMI  ENMT: Moist mucous membranes  NECK: Supple, No JVD, Normal thyroid  CHEST/LUNG: Clear to auscultation bilaterally; No rales, rhonchi, wheezing, or rubs  HEART: S1, S2, Regular rate and rhythm  ABDOMEN: Soft, Nontender, Nondistended; Bowel sounds present  NEURO: Alert & Oriented X3  EXTREMITIES: No LE edema, no calf tenderness  LYMPH: No lymphadenopathy noted  SKIN: No rashes or lesions    Consultant(s) Notes Reviewed:  [x ] YES  [ ] NO  Care Discussed with Consultants/Other Providers [ x] YES  [ ] NO    LABS:                        9.9    5.65  )-----------( 162      ( 19 Dec 2023 07:30 )             29.9     12-19    138  |  107  |  9   ----------------------------<  89  3.8   |  27  |  0.50    Ca    8.5      19 Dec 2023 07:30  Phos  3.5     12-19  Mg     2.2     12-19    TPro  6.5  /  Alb  3.0<L>  /  TBili  0.6  /  DBili  x   /  AST  20  /  ALT  24  /  AlkPhos  57  12-19    PT/INR - ( 18 Dec 2023 07:07 )   PT: 11.6 sec;   INR: 1.02 ratio         PTT - ( 18 Dec 2023 07:07 )  PTT:28.0 sec  CAPILLARY BLOOD GLUCOSE      Urinalysis Basic - ( 19 Dec 2023 07:30 )    Color: x / Appearance: x / SG: x / pH: x  Gluc: 89 mg/dL / Ketone: x  / Bili: x / Urobili: x   Blood: x / Protein: x / Nitrite: x   Leuk Esterase: x / RBC: x / WBC x   Sq Epi: x / Non Sq Epi: x / Bacteria: x        RADIOLOGY & ADDITIONAL TESTS:    Imaging Personally Reviewed:  [ x] YES  [ ] NO  < from: CT Pelvis Bony Only No Cont (12.18.23 @ 06:05) >    ACC: [id number] EXAM:  CT PELVIS BONY ONLY   ORDERED BY: ARISTEO MALLORY     PROCEDURE DATE:  12/18/2023          INTERPRETATION:  CLINICAL INFORMATION: Left hip pain.    TECHNIQUE: CT of the pelvis was performed in bone and soft tissue windows   withcoronal and sagittal reformats. No intravenous contrast was   administered. 3-D reformats were performed on a separate workstation.    COMPARISON: CT of the abdomen and pelvis from 10/7/2021.    FINDINGS:    Bone: An acute comminuted and mildly displaced fracture of the left   superior pubic ramus is seen extending into the left pubic symphysis and   left inferior pubic ramus. An acute comminuted and nondisplaced left   sacral ala fracture is noted. An old right superior and inferior pubic   ramus fractures are seen. No additional fracture or dislocation is   demonstrated.    Soft tissues: Mild subcutaneous inflammatory change is seen in the   lateral left hip. Enlargement of the left adductor musculature is noted   likely due to muscle strain and/or trauma.    Miscellaneous: Colonic diverticulosis is noted without diverticulitis.   The patient is status post a supracervical hysterectomy. No adnexal   masses are present. Urinary bladder is distended. Mild inflammatory   change is seen in the inferior left pelvis superior to the left superior   pubic ramus fracture.    IMPRESSION:    1. Acute comminuted and mildly displaced fracture of the left superior   pubic ramus extending into the left pubic symphysis and left inferior   pubic ramus.  2. Acute comminuted and nondisplaced left sacral ala fracture.    --- End of Report ---            GRAZYNA BAÑUELOS MD; Attending Radiologist  This document has been electronically signed. Dec 18 2023  6:17AM    < end of copied text >

## 2023-12-19 NOTE — PROGRESS NOTE ADULT - ASSESSMENT
68 yrs old F, from home, ambulating independently, pmhx HTN, anxiety, pshx appendectomy, presented with back and hip pain s/p mechanical fall. Pt is admitted for pain management  for  Acute comminuted and mildly displaced fracture of the left superior pubic ramus extending into the left pubic symphysis and left inferior   pubic ramus. Acute comminuted and nondisplaced left sacral ala fracture. Ortho  and neuro surgery, no intervention at this time. Pian management consulted

## 2023-12-19 NOTE — PROGRESS NOTE ADULT - PROBLEM SELECTOR PLAN 1
s/p mechanical fall, hip and back pain  CT scan noted for  Acute comminuted and mildly displaced fracture of the left superior pubic ramus extending into the left pubic symphysis and left inferior pubic ramus.  Ortho and neuro surgery- no acute surgical intervention at this time   c/w pain regimen pr pain service   - pain  management following  - Pending PT rahat

## 2023-12-19 NOTE — PROGRESS NOTE ADULT - NS ATTEND AMEND GEN_ALL_CORE FT
I have seen and evaluated the patient at the bedside.     She says that she is really struggling with pelvic pain related to her fracture. She does say the medicines are helping some. She also feels constipated. It has been two days since her last BM. She is eager to work with physical therapy.     On exam she is holding quite still due to the pain but in no acute distress. Afebrile and HDS. Cardiopulmonary exam unremarkable. She has an ecchymoses overlying the left hip. No other rashes.     I have reviewed her CBC, BMP, LFTs. Hgb stable 9.9 g/dL. No leukocytosis. Renal function normal. LFTs within normal limits.     Assessment and Plan:    68 yrs old F, from home, ambulating independently, pmhx HTN, anxiety, pshx appendectomy, presented with back and hip pain s/p mechanical fall. Pt is admitted for pain management  for  Acute comminuted and mildly displaced fracture of the left superior pubic ramus extending into the left pubic symphysis and left inferior pubic ramus. Acute comminuted and nondisplaced left sacral ala fracture. Evaluated by both neurosurgery and Orthopedic Surgery, recommending conservative management.    #Acute comminuted Left sacral Fracture  #Acute left pubic ramus fracture  #Acute Left Hip Pain  #HTN  #Pre-DM (A1c 6.1% by report)  #Recent Appendectomy  #Constipation    -PT/OT assessment  -pain control with multimodal pain regimen, appreciate Pain team  -bowel regimen with miralax and senna  -appreciate Orthopedic Surgery input    -rest of plan as above

## 2023-12-20 DIAGNOSIS — Z02.9 ENCOUNTER FOR ADMINISTRATIVE EXAMINATIONS, UNSPECIFIED: ICD-10-CM

## 2023-12-20 LAB
ANION GAP SERPL CALC-SCNC: 8 MMOL/L — SIGNIFICANT CHANGE UP (ref 5–17)
ANION GAP SERPL CALC-SCNC: 8 MMOL/L — SIGNIFICANT CHANGE UP (ref 5–17)
BUN SERPL-MCNC: 11 MG/DL — SIGNIFICANT CHANGE UP (ref 7–18)
BUN SERPL-MCNC: 11 MG/DL — SIGNIFICANT CHANGE UP (ref 7–18)
CALCIUM SERPL-MCNC: 8.3 MG/DL — LOW (ref 8.4–10.5)
CALCIUM SERPL-MCNC: 8.3 MG/DL — LOW (ref 8.4–10.5)
CHLORIDE SERPL-SCNC: 105 MMOL/L — SIGNIFICANT CHANGE UP (ref 96–108)
CHLORIDE SERPL-SCNC: 105 MMOL/L — SIGNIFICANT CHANGE UP (ref 96–108)
CO2 SERPL-SCNC: 25 MMOL/L — SIGNIFICANT CHANGE UP (ref 22–31)
CO2 SERPL-SCNC: 25 MMOL/L — SIGNIFICANT CHANGE UP (ref 22–31)
CREAT SERPL-MCNC: 0.57 MG/DL — SIGNIFICANT CHANGE UP (ref 0.5–1.3)
CREAT SERPL-MCNC: 0.57 MG/DL — SIGNIFICANT CHANGE UP (ref 0.5–1.3)
EGFR: 100 ML/MIN/1.73M2 — SIGNIFICANT CHANGE UP
EGFR: 100 ML/MIN/1.73M2 — SIGNIFICANT CHANGE UP
GLUCOSE SERPL-MCNC: 114 MG/DL — HIGH (ref 70–99)
GLUCOSE SERPL-MCNC: 114 MG/DL — HIGH (ref 70–99)
HCT VFR BLD CALC: 31.9 % — LOW (ref 34.5–45)
HCT VFR BLD CALC: 31.9 % — LOW (ref 34.5–45)
HGB BLD-MCNC: 10.4 G/DL — LOW (ref 11.5–15.5)
HGB BLD-MCNC: 10.4 G/DL — LOW (ref 11.5–15.5)
MCHC RBC-ENTMCNC: 31.1 PG — SIGNIFICANT CHANGE UP (ref 27–34)
MCHC RBC-ENTMCNC: 31.1 PG — SIGNIFICANT CHANGE UP (ref 27–34)
MCHC RBC-ENTMCNC: 32.6 GM/DL — SIGNIFICANT CHANGE UP (ref 32–36)
MCHC RBC-ENTMCNC: 32.6 GM/DL — SIGNIFICANT CHANGE UP (ref 32–36)
MCV RBC AUTO: 95.5 FL — SIGNIFICANT CHANGE UP (ref 80–100)
MCV RBC AUTO: 95.5 FL — SIGNIFICANT CHANGE UP (ref 80–100)
NRBC # BLD: 0 /100 WBCS — SIGNIFICANT CHANGE UP (ref 0–0)
NRBC # BLD: 0 /100 WBCS — SIGNIFICANT CHANGE UP (ref 0–0)
PLATELET # BLD AUTO: 150 K/UL — SIGNIFICANT CHANGE UP (ref 150–400)
PLATELET # BLD AUTO: 150 K/UL — SIGNIFICANT CHANGE UP (ref 150–400)
POTASSIUM SERPL-MCNC: 3.9 MMOL/L — SIGNIFICANT CHANGE UP (ref 3.5–5.3)
POTASSIUM SERPL-MCNC: 3.9 MMOL/L — SIGNIFICANT CHANGE UP (ref 3.5–5.3)
POTASSIUM SERPL-SCNC: 3.9 MMOL/L — SIGNIFICANT CHANGE UP (ref 3.5–5.3)
POTASSIUM SERPL-SCNC: 3.9 MMOL/L — SIGNIFICANT CHANGE UP (ref 3.5–5.3)
RBC # BLD: 3.34 M/UL — LOW (ref 3.8–5.2)
RBC # BLD: 3.34 M/UL — LOW (ref 3.8–5.2)
RBC # FLD: 12.4 % — SIGNIFICANT CHANGE UP (ref 10.3–14.5)
RBC # FLD: 12.4 % — SIGNIFICANT CHANGE UP (ref 10.3–14.5)
SODIUM SERPL-SCNC: 138 MMOL/L — SIGNIFICANT CHANGE UP (ref 135–145)
SODIUM SERPL-SCNC: 138 MMOL/L — SIGNIFICANT CHANGE UP (ref 135–145)
WBC # BLD: 9.16 K/UL — SIGNIFICANT CHANGE UP (ref 3.8–10.5)
WBC # BLD: 9.16 K/UL — SIGNIFICANT CHANGE UP (ref 3.8–10.5)
WBC # FLD AUTO: 9.16 K/UL — SIGNIFICANT CHANGE UP (ref 3.8–10.5)
WBC # FLD AUTO: 9.16 K/UL — SIGNIFICANT CHANGE UP (ref 3.8–10.5)

## 2023-12-20 PROCEDURE — 99232 SBSQ HOSP IP/OBS MODERATE 35: CPT

## 2023-12-20 RX ORDER — SODIUM CHLORIDE 9 MG/ML
500 INJECTION INTRAMUSCULAR; INTRAVENOUS; SUBCUTANEOUS ONCE
Refills: 0 | Status: COMPLETED | OUTPATIENT
Start: 2023-12-20 | End: 2023-12-20

## 2023-12-20 RX ORDER — ACETAMINOPHEN 500 MG
600 TABLET ORAL ONCE
Refills: 0 | Status: COMPLETED | OUTPATIENT
Start: 2023-12-20 | End: 2023-12-20

## 2023-12-20 RX ORDER — OXYCODONE HYDROCHLORIDE 5 MG/1
7.5 TABLET ORAL EVERY 4 HOURS
Refills: 0 | Status: DISCONTINUED | OUTPATIENT
Start: 2023-12-20 | End: 2023-12-22

## 2023-12-20 RX ORDER — MORPHINE SULFATE 50 MG/1
2 CAPSULE, EXTENDED RELEASE ORAL ONCE
Refills: 0 | Status: DISCONTINUED | OUTPATIENT
Start: 2023-12-20 | End: 2023-12-20

## 2023-12-20 RX ORDER — CLONAZEPAM 1 MG
0.5 TABLET ORAL ONCE
Refills: 0 | Status: DISCONTINUED | OUTPATIENT
Start: 2023-12-20 | End: 2023-12-20

## 2023-12-20 RX ORDER — ENOXAPARIN SODIUM 100 MG/ML
40 INJECTION SUBCUTANEOUS EVERY 24 HOURS
Refills: 0 | Status: DISCONTINUED | OUTPATIENT
Start: 2023-12-21 | End: 2023-12-22

## 2023-12-20 RX ORDER — HYDROMORPHONE HYDROCHLORIDE 2 MG/ML
0.5 INJECTION INTRAMUSCULAR; INTRAVENOUS; SUBCUTANEOUS ONCE
Refills: 0 | Status: DISCONTINUED | OUTPATIENT
Start: 2023-12-20 | End: 2023-12-20

## 2023-12-20 RX ADMIN — Medication 0.5 MILLIGRAM(S): at 15:55

## 2023-12-20 RX ADMIN — Medication 600 MILLIGRAM(S): at 22:30

## 2023-12-20 RX ADMIN — MORPHINE SULFATE 2 MILLIGRAM(S): 50 CAPSULE, EXTENDED RELEASE ORAL at 03:45

## 2023-12-20 RX ADMIN — POLYETHYLENE GLYCOL 3350 17 GRAM(S): 17 POWDER, FOR SOLUTION ORAL at 12:39

## 2023-12-20 RX ADMIN — Medication 1 MILLIGRAM(S): at 00:35

## 2023-12-20 RX ADMIN — ENOXAPARIN SODIUM 30 MILLIGRAM(S): 100 INJECTION SUBCUTANEOUS at 10:30

## 2023-12-20 RX ADMIN — Medication 1 MILLIGRAM(S): at 21:30

## 2023-12-20 RX ADMIN — HYDROMORPHONE HYDROCHLORIDE 0.5 MILLIGRAM(S): 2 INJECTION INTRAMUSCULAR; INTRAVENOUS; SUBCUTANEOUS at 18:35

## 2023-12-20 RX ADMIN — LIDOCAINE 1 PATCH: 4 CREAM TOPICAL at 19:30

## 2023-12-20 RX ADMIN — SODIUM CHLORIDE 1000 MILLILITER(S): 9 INJECTION INTRAMUSCULAR; INTRAVENOUS; SUBCUTANEOUS at 22:12

## 2023-12-20 RX ADMIN — LIDOCAINE 1 PATCH: 4 CREAM TOPICAL at 12:39

## 2023-12-20 RX ADMIN — OXYCODONE HYDROCHLORIDE 7.5 MILLIGRAM(S): 5 TABLET ORAL at 17:31

## 2023-12-20 RX ADMIN — Medication 240 MILLIGRAM(S): at 21:29

## 2023-12-20 RX ADMIN — CYCLOBENZAPRINE HYDROCHLORIDE 5 MILLIGRAM(S): 10 TABLET, FILM COATED ORAL at 05:46

## 2023-12-20 RX ADMIN — Medication 0.5 MILLIGRAM(S): at 02:15

## 2023-12-20 RX ADMIN — HYDROMORPHONE HYDROCHLORIDE 0.5 MILLIGRAM(S): 2 INJECTION INTRAMUSCULAR; INTRAVENOUS; SUBCUTANEOUS at 18:23

## 2023-12-20 RX ADMIN — OXYCODONE HYDROCHLORIDE 7.5 MILLIGRAM(S): 5 TABLET ORAL at 23:50

## 2023-12-20 RX ADMIN — MORPHINE SULFATE 2 MILLIGRAM(S): 50 CAPSULE, EXTENDED RELEASE ORAL at 03:15

## 2023-12-20 RX ADMIN — VALSARTAN 40 MILLIGRAM(S): 80 TABLET ORAL at 05:46

## 2023-12-20 RX ADMIN — OXYCODONE HYDROCHLORIDE 7.5 MILLIGRAM(S): 5 TABLET ORAL at 16:45

## 2023-12-20 RX ADMIN — OXYCODONE HYDROCHLORIDE 7.5 MILLIGRAM(S): 5 TABLET ORAL at 12:37

## 2023-12-20 RX ADMIN — OXYCODONE HYDROCHLORIDE 7.5 MILLIGRAM(S): 5 TABLET ORAL at 22:50

## 2023-12-20 RX ADMIN — OXYCODONE HYDROCHLORIDE 7.5 MILLIGRAM(S): 5 TABLET ORAL at 13:30

## 2023-12-20 NOTE — PHYSICAL THERAPY INITIAL EVALUATION ADULT - PASSIVE RANGE OF MOTION EXAMINATION, REHAB EVAL
Left hip flex 0-65 degrees; left knee flex 0-75 degrees/bilateral upper extremity Passive ROM was WFL (within functional limits)/Right LE Passive ROM was WFL (within functional limits)/deficits as listed below

## 2023-12-20 NOTE — PROGRESS NOTE ADULT - SUBJECTIVE AND OBJECTIVE BOX
Source of information: ROULA PASTOR, Chart review  Patient language: English  : n/a    HPI:  68 yrs old F, from home, ambulating independently, pmhx HTN, anxiety, pshx appendectomy, presented with back and hip pain s/p mechanical fall. Pt stated that she is unsure of how she has fallen down, did not trip but likely when turning lost balance and hit the wall or the floor. Pt denied chest pain, palpitation, dyspnea, dizziness, visual changes, abdominal pain, N/V/D, dysuria, fever, chills, unilateral weakness or decreased sensation however has been generally weak lately s/p appendectomy and was on the physical therapy at home for regaining her strength. Pt denied saddle anesthesia, urinary or bladder incontinence however endorsed inability to urinate despite the urge.   Pt denied alcohol consumption, smoking or use of illicit drugs. She endorsed getting Morphine after the surgery which was prescribed however no longer taking.  (18 Dec 2023 08:53)    Patient is a 68y old  Female who presents with a chief complaint of pubic and sacral fracture (19 Dec 2023 11:47)    CT pelvis 12/18 demonstrated acute comminuted and mildly displaced fracture of the left superior pubic ramus extending into the left pubic symphysis and left inferior pubic ramus.  Acute comminuted and nondisplaced left sacral ala fracture.    Pt is a 68 year old female admitted for public and sacral fracture after a fall sustained at home. Pt denies LOC with fall.  Pain consulted on 12/19 for management of left hip/buttock pain and chronic back pain. Pt reports that she recently spent 6 weeks at [facility] for treatment of a ruptured appendix with surgical/medical management. States that post hospitalization she was participating in PT sessions at home to improve the strength in her LEs.  States that she was improving well however she "tripped over a cord while getting out of bed" last Sunday.  Pt reports chronic cervical and lumbar spine pain and was previously being treated with LESI and oral analgesics for chronic pain.  Pt states that she was under the care of a physician at [facility] pain management in [place] however she stopped going to the doctor as she was not pleased with the services.   Pt seen and examined at bedside this morning. Reports pain score 8/10 while laying in bed SCALE USED: (1-10 VNRS). Pt describes pain as aching, deep radiating to left buttock.  Pain is alleviated by Percocet and is exacerbated by movement. Patient reports difficulty with requesting PRN Percocet overnight, "I was told I can't have Percocet and because I'm also receiving Flexeril and clonazepam." Patient educated on medications currently ordered, and informed that Percocet is ordered every four hrs PRN for severe pain. Pt also reports chronic left ankle pain s/p left ankle surgery in 2019. Pt states takes Percocet 5/325 mg every 4-6 hours at home with good pain relief and is opioid dependent. Verified on iStop. Pt tolerating PO diet. Denies lethargy, chest pain, SOB, nausea, vomiting. Reports constipation, last BM 12/17. Patient stated goal for pain control: to be able to take deep breaths, get out of bed to chair and ambulate with tolerable pain control. Pt ambulates with rolling waker at home.    PAST MEDICAL & SURGICAL HISTORY:  Cervical disc disorder at C6-C7 level with radiculopathy  with ulnar nerve compression    Anxiety    Carpal tunnel syndrome of left wrist    Breast mass, left    Uterine fibroid    Cataract  bilateral eyes    HPV in female    Fibrocystic breast disease    Subclavian arterial stenosis  left side 2018    Foot fracture, left  2016, s/p pins/screws    Osteoporosis    Chronic pain    History of spinal fusion  cervical C5-C7 with metal hardware-2012 ( discectomy)    S/P carpal tunnel release  left-2015    S/P decompression of ulnar nerve at elbow  left -2015 ( done with carpal tunnel release)    S/P lumpectomy, left breast  2010    S/P hysterectomy  2008    H/O bilateral breast reduction surgery  2002    S/P cataract surgery, right  with artificial lenses- November 2016    S/P tonsillectomy    S/P cataract surgery, left    S/P ORIF (open reduction internal fixation) fracture  left foot    FAMILY HISTORY:  Family history of non-Hodgkin's lymphoma (Mother)    Acute myocardial infarction (Father)    Social History:   [X] Denies ETOH use, illicit drug use and smoking - quit smoking recently    Allergies  Avelox (Other)  NSAIDs (Other; Stomach Upset)  penicillin (Other)    MEDICATIONS  (STANDING):  clonazePAM  Tablet 1 milliGRAM(s) Oral at bedtime  cyclobenzaprine 5 milliGRAM(s) Oral three times a day  enoxaparin Injectable 30 milliGRAM(s) SubCutaneous every 24 hours  influenza  Vaccine (HIGH DOSE) 0.7 milliLiter(s) IntraMuscular once  lidocaine   4% Patch 1 Patch Transdermal daily  valsartan 40 milliGRAM(s) Oral daily    MEDICATIONS  (PRN):  clonazePAM  Tablet 0.5 milliGRAM(s) Oral daily PRN anxiety  oxycodone    5 mG/acetaminophen 325 mG 1 Tablet(s) Oral every 4 hours PRN Severe Pain (7 - 10)  polyethylene glycol 3350 17 Gram(s) Oral daily PRN Constipation  senna 2 Tablet(s) Oral at bedtime PRN Constipation  simethicone 80 milliGRAM(s) Chew two times a day PRN Gas    Vital Signs Last 24 Hrs  T(C): 37.1 (20 Dec 2023 05:37), Max: 37.1 (20 Dec 2023 05:37)  T(F): 98.8 (20 Dec 2023 05:37), Max: 98.8 (20 Dec 2023 05:37)  HR: 101 (20 Dec 2023 05:37) (92 - 101)  BP: 128/84 (20 Dec 2023 05:37) (88/52 - 137/79)  BP(mean): --  RR: 18 (20 Dec 2023 05:37) (16 - 18)  SpO2: 95% (20 Dec 2023 05:37) (95% - 96%)    Parameters below as of 20 Dec 2023 05:37  Patient On (Oxygen Delivery Method): room air    LABS: Reviewed                   10.4   9.16  )-----------( 150      ( 20 Dec 2023 05:35 )             31.9     12-20    138  |  105  |  11  ----------------------------<  114<H>  3.9   |  25  |  0.57    Ca    8.3<L>      20 Dec 2023 05:35  Phos  3.5     12-19  Mg     2.2     12-19    TPro  6.5  /  Alb  3.0<L>  /  TBili  0.6  /  DBili  x   /  AST  20  /  ALT  24  /  AlkPhos  57  12-19    LIVER FUNCTIONS - ( 19 Dec 2023 07:30 )  Alb: 3.0 g/dL / Pro: 6.5 g/dL / ALK PHOS: 57 U/L / ALT: 24 U/L DA / AST: 20 U/L / GGT: x           Urinalysis Basic - ( 20 Dec 2023 05:35 )    Color: x / Appearance: x / SG: x / pH: x  Gluc: 114 mg/dL / Ketone: x  / Bili: x / Urobili: x   Blood: x / Protein: x / Nitrite: x   Leuk Esterase: x / RBC: x / WBC x   Sq Epi: x / Non Sq Epi: x / Bacteria: x    CAPILLARY BLOOD GLUCOSE    Radiology: Reviewed.  < from: CT Pelvis Bony Only No Cont (12.18.23 @ 06:05) >    ACC: [id number] EXAM:  CT PELVIS BONY ONLY   ORDERED BY: ARISTEO MALLORY     PROCEDURE DATE:  12/18/2023      INTERPRETATION:  CLINICAL INFORMATION: Left hip pain.    TECHNIQUE: CT of the pelvis was performed in bone and soft tissue windows   withcoronal and sagittal reformats. No intravenous contrast was   administered. 3-D reformats were performed on a separate workstation.    COMPARISON: CT of the abdomen and pelvis from 10/7/2021.    FINDINGS:    Bone: An acute comminuted and mildly displaced fracture of the left   superior pubic ramus is seen extending into the left pubic symphysis and   left inferior pubic ramus. An acute comminuted and nondisplaced left   sacral ala fracture is noted. An old right superior and inferior pubic   ramus fractures are seen. No additional fracture or dislocation is   demonstrated.    Soft tissues: Mild subcutaneous inflammatory change is seen in the   lateral left hip. Enlargement of the left adductor musculature is noted   likely due to muscle strain and/or trauma.    Miscellaneous: Colonic diverticulosis is noted without diverticulitis.   The patient is status post a supracervical hysterectomy. No adnexal   masses are present. Urinary bladder is distended. Mild inflammatory   change is seen in the inferior left pelvis superior to the left superior   pubic ramus fracture.    IMPRESSION:    1. Acute comminuted and mildly displaced fracture of the left superior   pubic ramus extending into the left pubic symphysis and left inferior   pubic ramus.  2. Acute comminuted and nondisplaced left sacral ala fracture.    --- End of Report ---    GRAZYNA BAÑUELOS MD; Attending Radiologist  This document has been electronically signed. Dec 18 2023  6:17AM    < end of copied text >    ORT Score -   Family Hx of substance abuse	Female	      Male  Alcohol 	                                           1                     3  Illegal drugs	                                   2                     3  Rx drugs                                           4 	                  4  Personal Hx of substance abuse		  Alcohol 	                                          3	                  3  Illegal drugs                                     4	                  4  Rx drugs                                            5 	                  5  Age between 16- 45 years	           1                     1  hx preadolescent sexual abuse	   3 	                  0  Psychological disease		  ADD, OCD, bipolar, schizophrenia   2	          2  Depression                                           1 	          1  Total: 1    a score of 3 or lower indicates low risk for opioid abuse		  a score of 4-7 indicates moderate risk for opioid abuse		  a score of 8 or higher indicates high risk for opioid abuse  	  REVIEW OF SYSTEMS:  CONSTITUTIONAL: No fever or fatigue  HEENT:  No difficulty hearing, no change in vision  NECK: + chronic neck pain, no neck stiffness  RESPIRATORY: No cough, wheezing, chills or hemoptysis; No shortness of breath  CARDIOVASCULAR: No chest pain, palpitations, dizziness, or leg swelling  GASTROINTESTINAL: No loss of appetite, + decreased PO intake. No abdominal or epigastric pain. No nausea, vomiting; No diarrhea. + constipation, last BM 12/17  GENITOURINARY: No dysuria, frequency, hematuria, retention or incontinence  MUSCULOSKELETAL: + left hip pain that radiates to left buttock. + "tight" sensation in left buttock. + c/o chronic lumbar back pain with history of LESI, + C spine surgery in 2013, + left hip pain with movement, no right upper or right lower motor strength weakness, no saddle anesthesia, bowel/bladder incontinence, + fall, + left upper extremity numbness with + paresthesias  NEURO: No headaches, + LUE numbness with tingling, + left ankle burning pain chronic pain, + weakness in LLE  PSYCHIATRIC: + history of anxiety/depression    PHYSICAL EXAM:  GENERAL:  Alert & Oriented X 4, cooperative, NAD, Good concentration. Speech is clear.   RESPIRATORY: Respirations even and unlabored. Clear to auscultation bilaterally  CARDIOVASCULAR: Normal S1/S2, regular rate and rhythm;   GASTROINTESTINAL:  Soft, Nontender, Nondistended; Bowel sounds present  PERIPHERAL VASCULAR:  Extremities warm without edema. 2+ Peripheral Pulses, No cyanosis, No calf tenderness  MUSCULOSKELETAL: Motor Strength 5/5 B/L upper and RLE, 3/5 left lower extremity; ROM intact in BUE and right LE, + SLR at 40 degrees in LLE; + tenderness on palpation of left hip, left pubic   SKIN: Warm, dry, intact.  + ecchymosis in left hip    Risk factors associated with adverse outcomes related to opioid treatment  [X]  Concurrent benzodiazepine use  [ ]  History/ Active substance use or alcohol use disorder  [X] Psychiatric co-morbidity  [ ] Sleep apnea  [ ] COPD  [ ] BMI> 35  [ ] Liver dysfunction  [ ] Renal dysfunction  [ ] CHF  [ ] Smoker  [X]  Age > 60 years    [X]  [place]  Reviewed and Copied to Chart. See below.    Plan of care and goal oriented pain management treatment options were discussed with patient and /or primary care giver; all questions and concerns were addressed and care was aligned with patient's wishes.    Educated patient on goal oriented pain management treatment options      Source of information: ROULA PASTOR, Chart review  Patient language: English  : n/a    HPI:  68 yrs old F, from home, ambulating independently, pmhx HTN, anxiety, pshx appendectomy, presented with back and hip pain s/p mechanical fall. Pt stated that she is unsure of how she has fallen down, did not trip but likely when turning lost balance and hit the wall or the floor. Pt denied chest pain, palpitation, dyspnea, dizziness, visual changes, abdominal pain, N/V/D, dysuria, fever, chills, unilateral weakness or decreased sensation however has been generally weak lately s/p appendectomy and was on the physical therapy at home for regaining her strength. Pt denied saddle anesthesia, urinary or bladder incontinence however endorsed inability to urinate despite the urge.   Pt denied alcohol consumption, smoking or use of illicit drugs. She endorsed getting Morphine after the surgery which was prescribed however no longer taking.  (18 Dec 2023 08:53)    Patient is a 68y old  Female who presents with a chief complaint of pubic and sacral fracture (19 Dec 2023 11:47)    CT pelvis 12/18 demonstrated acute comminuted and mildly displaced fracture of the left superior pubic ramus extending into the left pubic symphysis and left inferior pubic ramus.  Acute comminuted and nondisplaced left sacral ala fracture.    Pt is a 68 year old female admitted for public and sacral fracture after a fall sustained at home. Pt denies LOC with fall.  Pain consulted on 12/19 for management of left hip/buttock pain and chronic back pain. Pt reports that she recently spent 6 weeks at [facility] for treatment of a ruptured appendix with surgical/medical management. States that post hospitalization she was participating in PT sessions at home to improve the strength in her LEs.  States that she was improving well however she "tripped over a cord while getting out of bed" last Sunday.  Pt reports chronic cervical and lumbar spine pain and was previously being treated with LESI and oral analgesics for chronic pain.  Pt states that she was under the care of a physician at [facility] pain management in [place] however she stopped going to the doctor as she was not pleased with the services.   Pt seen and examined at bedside this morning. Reports pain score 8/10 while laying in bed SCALE USED: (1-10 VNRS). Pt describes pain as aching, deep radiating to left buttock.  Pain is alleviated by Percocet and is exacerbated by movement. Patient reports difficulty with requesting PRN Percocet overnight, "I was told I can't have Percocet and because I'm also receiving Flexeril and clonazepam." Patient educated on medications currently ordered, and informed that Percocet is ordered every four hrs PRN for severe pain. Pt also reports chronic left ankle pain s/p left ankle surgery in 2019. Pt states takes Percocet 5/325 mg every 4-6 hours at home with good pain relief and is opioid dependent. Verified on iStop. Pt tolerating PO diet. Denies lethargy, chest pain, SOB, nausea, vomiting. Reports constipation, last BM 12/17. Patient stated goal for pain control: to be able to take deep breaths, get out of bed to chair and ambulate with tolerable pain control. Pt ambulates with rolling waker at home.    PAST MEDICAL & SURGICAL HISTORY:  Cervical disc disorder at C6-C7 level with radiculopathy  with ulnar nerve compression    Anxiety    Carpal tunnel syndrome of left wrist    Breast mass, left    Uterine fibroid    Cataract  bilateral eyes    HPV in female    Fibrocystic breast disease    Subclavian arterial stenosis  left side 2018    Foot fracture, left  2016, s/p pins/screws    Osteoporosis    Chronic pain    History of spinal fusion  cervical C5-C7 with metal hardware-2012 ( discectomy)    S/P carpal tunnel release  left-2015    S/P decompression of ulnar nerve at elbow  left -2015 ( done with carpal tunnel release)    S/P lumpectomy, left breast  2010    S/P hysterectomy  2008    H/O bilateral breast reduction surgery  2002    S/P cataract surgery, right  with artificial lenses- November 2016    S/P tonsillectomy    S/P cataract surgery, left    S/P ORIF (open reduction internal fixation) fracture  left foot    FAMILY HISTORY:  Family history of non-Hodgkin's lymphoma (Mother)    Acute myocardial infarction (Father)    Social History:   [X] Denies ETOH use, illicit drug use and smoking - quit smoking recently    Allergies  Avelox (Other)  NSAIDs (Other; Stomach Upset)  penicillin (Other)    MEDICATIONS  (STANDING):  clonazePAM  Tablet 1 milliGRAM(s) Oral at bedtime  cyclobenzaprine 5 milliGRAM(s) Oral three times a day  enoxaparin Injectable 30 milliGRAM(s) SubCutaneous every 24 hours  influenza  Vaccine (HIGH DOSE) 0.7 milliLiter(s) IntraMuscular once  lidocaine   4% Patch 1 Patch Transdermal daily  valsartan 40 milliGRAM(s) Oral daily    MEDICATIONS  (PRN):  clonazePAM  Tablet 0.5 milliGRAM(s) Oral daily PRN anxiety  oxycodone    5 mG/acetaminophen 325 mG 1 Tablet(s) Oral every 4 hours PRN Severe Pain (7 - 10)  polyethylene glycol 3350 17 Gram(s) Oral daily PRN Constipation  senna 2 Tablet(s) Oral at bedtime PRN Constipation  simethicone 80 milliGRAM(s) Chew two times a day PRN Gas    Vital Signs Last 24 Hrs  T(C): 37.1 (20 Dec 2023 05:37), Max: 37.1 (20 Dec 2023 05:37)  T(F): 98.8 (20 Dec 2023 05:37), Max: 98.8 (20 Dec 2023 05:37)  HR: 101 (20 Dec 2023 05:37) (92 - 101)  BP: 128/84 (20 Dec 2023 05:37) (88/52 - 137/79)  BP(mean): --  RR: 18 (20 Dec 2023 05:37) (16 - 18)  SpO2: 95% (20 Dec 2023 05:37) (95% - 96%)    Parameters below as of 20 Dec 2023 05:37  Patient On (Oxygen Delivery Method): room air    LABS: Reviewed                   10.4   9.16  )-----------( 150      ( 20 Dec 2023 05:35 )             31.9     12-20    138  |  105  |  11  ----------------------------<  114<H>  3.9   |  25  |  0.57    Ca    8.3<L>      20 Dec 2023 05:35  Phos  3.5     12-19  Mg     2.2     12-19    TPro  6.5  /  Alb  3.0<L>  /  TBili  0.6  /  DBili  x   /  AST  20  /  ALT  24  /  AlkPhos  57  12-19    LIVER FUNCTIONS - ( 19 Dec 2023 07:30 )  Alb: 3.0 g/dL / Pro: 6.5 g/dL / ALK PHOS: 57 U/L / ALT: 24 U/L DA / AST: 20 U/L / GGT: x           Urinalysis Basic - ( 20 Dec 2023 05:35 )    Color: x / Appearance: x / SG: x / pH: x  Gluc: 114 mg/dL / Ketone: x  / Bili: x / Urobili: x   Blood: x / Protein: x / Nitrite: x   Leuk Esterase: x / RBC: x / WBC x   Sq Epi: x / Non Sq Epi: x / Bacteria: x    CAPILLARY BLOOD GLUCOSE    Radiology: Reviewed.  < from: CT Pelvis Bony Only No Cont (12.18.23 @ 06:05) >    ACC: [id number] EXAM:  CT PELVIS BONY ONLY   ORDERED BY: ARISTEO MALLORY     PROCEDURE DATE:  12/18/2023      INTERPRETATION:  CLINICAL INFORMATION: Left hip pain.    TECHNIQUE: CT of the pelvis was performed in bone and soft tissue windows   withcoronal and sagittal reformats. No intravenous contrast was   administered. 3-D reformats were performed on a separate workstation.    COMPARISON: CT of the abdomen and pelvis from 10/7/2021.    FINDINGS:    Bone: An acute comminuted and mildly displaced fracture of the left   superior pubic ramus is seen extending into the left pubic symphysis and   left inferior pubic ramus. An acute comminuted and nondisplaced left   sacral ala fracture is noted. An old right superior and inferior pubic   ramus fractures are seen. No additional fracture or dislocation is   demonstrated.    Soft tissues: Mild subcutaneous inflammatory change is seen in the   lateral left hip. Enlargement of the left adductor musculature is noted   likely due to muscle strain and/or trauma.    Miscellaneous: Colonic diverticulosis is noted without diverticulitis.   The patient is status post a supracervical hysterectomy. No adnexal   masses are present. Urinary bladder is distended. Mild inflammatory   change is seen in the inferior left pelvis superior to the left superior   pubic ramus fracture.    IMPRESSION:    1. Acute comminuted and mildly displaced fracture of the left superior   pubic ramus extending into the left pubic symphysis and left inferior   pubic ramus.  2. Acute comminuted and nondisplaced left sacral ala fracture.    --- End of Report ---    GRAZYNA BAÑUELOS MD; Attending Radiologist  This document has been electronically signed. Dec 18 2023  6:17AM    < end of copied text >    ORT Score -   Family Hx of substance abuse	Female	      Male  Alcohol 	                                           1                     3  Illegal drugs	                                   2                     3  Rx drugs                                           4 	                  4  Personal Hx of substance abuse		  Alcohol 	                                          3	                  3  Illegal drugs                                     4	                  4  Rx drugs                                            5 	                  5  Age between 16- 45 years	           1                     1  hx preadolescent sexual abuse	   3 	                  0  Psychological disease		  ADD, OCD, bipolar, schizophrenia   2	          2  Depression                                           1 	          1  Total: 1    a score of 3 or lower indicates low risk for opioid abuse		  a score of 4-7 indicates moderate risk for opioid abuse		  a score of 8 or higher indicates high risk for opioid abuse  	  REVIEW OF SYSTEMS:  CONSTITUTIONAL: No fever or fatigue  HEENT:  No difficulty hearing, no change in vision  NECK: + chronic neck pain, no neck stiffness  RESPIRATORY: No cough, wheezing, chills or hemoptysis; No shortness of breath  CARDIOVASCULAR: No chest pain, palpitations, dizziness, or leg swelling  GASTROINTESTINAL: No loss of appetite, + decreased PO intake. No abdominal or epigastric pain. No nausea, vomiting; No diarrhea. + constipation, last BM 12/17  GENITOURINARY: No dysuria, frequency, hematuria, retention or incontinence  MUSCULOSKELETAL: + left hip pain that radiates to left buttock. + "tight" sensation in left buttock. + c/o chronic lumbar back pain with history of LESI, + C spine surgery in 2013, + left hip pain with movement, no right upper or right lower motor strength weakness, no saddle anesthesia, bowel/bladder incontinence, + fall, + left upper extremity numbness with + paresthesias  NEURO: No headaches, + LUE numbness with tingling, + left ankle burning pain chronic pain, + weakness in LLE  PSYCHIATRIC: + history of anxiety/depression    PHYSICAL EXAM:  GENERAL:  Alert & Oriented X 4, cooperative, NAD, Good concentration. Speech is clear.   RESPIRATORY: Respirations even and unlabored. Clear to auscultation bilaterally  CARDIOVASCULAR: Normal S1/S2, regular rate and rhythm;   GASTROINTESTINAL:  Soft, Nontender, Nondistended; Bowel sounds present  PERIPHERAL VASCULAR:  Extremities warm without edema. 2+ Peripheral Pulses, No cyanosis, No calf tenderness  MUSCULOSKELETAL: Motor Strength 5/5 B/L upper and RLE, 3/5 left lower extremity; ROM intact in BUE and right LE, + SLR at 40 degrees in LLE; + tenderness on palpation of left hip, left pubic   SKIN: Warm, dry, intact.  + ecchymosis in left hip    Risk factors associated with adverse outcomes related to opioid treatment  [X]  Concurrent benzodiazepine use  [ ]  History/ Active substance use or alcohol use disorder  [X] Psychiatric co-morbidity  [ ] Sleep apnea  [ ] COPD  [ ] BMI> 35  [ ] Liver dysfunction  [ ] Renal dysfunction  [ ] CHF  [ ] Smoker  [X]  Age > 60 years    [X]  [place]  Reviewed and Copied to Chart. See below.    Plan of care and goal oriented pain management treatment options were discussed with patient and /or primary care giver; all questions and concerns were addressed and care was aligned with patient's wishes.    Educated patient on goal oriented pain management treatment options

## 2023-12-20 NOTE — PROGRESS NOTE ADULT - NS ATTEND AMEND GEN_ALL_CORE FT
I have seen and evaluated the patient at the bedside.     She says that she is really struggling with pelvic pain related to her fracture. She required a dose of IV pain medication last night but that at least allowed her to sleep well. She feels that the Flexeril causes her to feel like she is hungover and she would like to avoid gabapentin given past bad experience with somnolence.     On exam she is holding quite still due to the pain but in no acute distress. Afebrile and HDS. Cardiopulmonary exam unremarkable. She has an ecchymoses overlying the left hip. No other rashes.     I have reviewed her CBC, BMP, LFTs. Hgb stable 9.9 g/dL. No leukocytosis. Renal function normal. Can stop labs     Assessment and Plan:    68 yrs old F, from home, ambulating independently, pmhx HTN, anxiety, pshx appendectomy, presented with back and hip pain s/p mechanical fall. Pt is admitted for pain management  for  Acute comminuted and mildly displaced fracture of the left superior pubic ramus extending into the left pubic symphysis and left inferior pubic ramus. Acute comminuted and nondisplaced left sacral ala fracture. Evaluated by both neurosurgery and Orthopedic Surgery, recommending conservative management.    #Acute comminuted Left sacral Fracture  #Acute left pubic ramus fracture  #Acute Left Hip Pain  #HTN  #Pre-DM (A1c 6.1% by report)  #Recent Appendectomy  #Constipation    -increase dose of oxycodone to 7.5 mg on 12/20, every 4 hours as needed for severe pain  -PT/OT assessment -> DAVID, working on choices; patient able to get to chair, encouraged  -pain control with multimodal pain regimen, appreciate Pain team  -bowel regimen with miralax and senna  -appreciate Orthopedic Surgery input    -rest of plan as above.

## 2023-12-20 NOTE — PROGRESS NOTE ADULT - SUBJECTIVE AND OBJECTIVE BOX
NP Note discussed with  Primary Attending    INTERVAL HPI/OVERNIGHT EVENTS: Pt seen in bed, complaints of pain to back and hip area. pt is interested in rehab facility as she lives alone and no help at home.     MEDICATIONS  (STANDING):  clonazePAM  Tablet 1 milliGRAM(s) Oral at bedtime  cyclobenzaprine 5 milliGRAM(s) Oral three times a day  enoxaparin Injectable 30 milliGRAM(s) SubCutaneous every 24 hours  influenza  Vaccine (HIGH DOSE) 0.7 milliLiter(s) IntraMuscular once  lidocaine   4% Patch 1 Patch Transdermal daily  valsartan 40 milliGRAM(s) Oral daily    MEDICATIONS  (PRN):  clonazePAM  Tablet 0.5 milliGRAM(s) Oral daily PRN anxiety  oxycodone    5 mG/acetaminophen 325 mG 1 Tablet(s) Oral every 4 hours PRN Severe Pain (7 - 10)  polyethylene glycol 3350 17 Gram(s) Oral daily PRN Constipation  senna 2 Tablet(s) Oral at bedtime PRN Constipation  simethicone 80 milliGRAM(s) Chew two times a day PRN Gas      __________________________________________________  REVIEW OF SYSTEMS:    CONSTITUTIONAL: No fever,   EYES: no acute visual disturbances  NECK: No pain or stiffness  RESPIRATORY: No cough; No shortness of breath  CARDIOVASCULAR: No chest pain, no palpitations  GASTROINTESTINAL: No pain. No nausea or vomiting; No diarrhea   NEUROLOGICAL: No headache or numbness, no tremors  MUSCULOSKELETAL: back and hip pain 6/10  GENITOURINARY: no dysuria, no frequency, no hesitancy  PSYCHIATRY: no depression , no anxiety  ALL OTHER  ROS negative        Vital Signs Last 24 Hrs  T(C): 37.1 (20 Dec 2023 05:37), Max: 37.1 (20 Dec 2023 05:37)  T(F): 98.8 (20 Dec 2023 05:37), Max: 98.8 (20 Dec 2023 05:37)  HR: 101 (20 Dec 2023 05:37) (92 - 101)  BP: 128/84 (20 Dec 2023 05:37) (88/52 - 137/79)  BP(mean): --  RR: 18 (20 Dec 2023 05:37) (16 - 18)  SpO2: 95% (20 Dec 2023 05:37) (95% - 96%)    Parameters below as of 20 Dec 2023 05:37  Patient On (Oxygen Delivery Method): room air        ________________________________________________  PHYSICAL EXAM:  GENERAL: NAD, frail female  HEENT: Normocephalic;  conjunctivae and sclerae clear; moist mucous membranes;   NECK : supple  CHEST/LUNG: Clear to auscultation bilaterally with good air entry   HEART: S1 S2  regular; no murmurs, gallops or rubs  ABDOMEN: Soft, Nontender, Nondistended; Bowel sounds present  musk: limited ROM due to pain to hips/back  EXTREMITIES: no cyanosis; no edema; no calf tenderness  SKIN: warm and dry; no rash, scattered old scars/pigmentation to lower legs.   NERVOUS SYSTEM:  Awake and alert; Oriented  to place, person and time ; no new deficits    _________________________________________________  LABS:                        10.4   9.16  )-----------( 150      ( 20 Dec 2023 05:35 )             31.9     12-20    138  |  105  |  11  ----------------------------<  114<H>  3.9   |  25  |  0.57    Ca    8.3<L>      20 Dec 2023 05:35  Phos  3.5     12-19  Mg     2.2     12-19    TPro  6.5  /  Alb  3.0<L>  /  TBili  0.6  /  DBili  x   /  AST  20  /  ALT  24  /  AlkPhos  57  12-19      Urinalysis Basic - ( 20 Dec 2023 05:35 )    Color: x / Appearance: x / SG: x / pH: x  Gluc: 114 mg/dL / Ketone: x  / Bili: x / Urobili: x   Blood: x / Protein: x / Nitrite: x   Leuk Esterase: x / RBC: x / WBC x   Sq Epi: x / Non Sq Epi: x / Bacteria: x      CAPILLARY BLOOD GLUCOSE    RADIOLOGY & ADDITIONAL TESTS:    Imaging  Reviewed:  YES    < from: Xray Pelvis AP only (12.18.23 @ 01:52) >    ACC: [id number] EXAM:  XR FEMUR 2 VIEWS LT   ORDERED BY: ARISTEO MALLORY     ACC: [id number] EXAM:  XR PELVIS AP ONLY 1-2 VIEWS   ORDERED BY: ARISTEO MALLORY     PROCEDURE DATE:  12/18/2023          INTERPRETATION:  Pelvis and left femur. Patient has local pain after a   fall.    Pelvis. There is slight right hip degeneration. Left hip is unremarkable.    There is an old fracture deformity of the superior and inferior right   pubic rami.    There are acute fractures of the left superior and left inferior pubic   ramus which are better demonstrated on CAT scan of the same day.    Left femur. 4 views. Left knee is unremarkable. Femurs intact.    IMPRESSION: Old right pelvic fractures.  There are new left pelvic fractures. The CAT scan also showeda left   sacral alar fracture.          --- End of Report ---            JOSE GONZALEZ MD; Attending Radiologist  This document has been electronically signed. Dec 18 2023  8:44AM    < end of copied text >    < from: CT Pelvis Bony Only No Cont (12.18.23 @ 06:05) >    ACC: [id number] EXAM:  CT PELVIS BONY ONLY   ORDERED BY: ARISTEO MALLORY     PROCEDURE DATE:  12/18/2023          INTERPRETATION:  CLINICAL INFORMATION: Left hip pain.    TECHNIQUE: CT of the pelvis was performed in bone and soft tissue windows   withcoronal and sagittal reformats. No intravenous contrast was   administered. 3-D reformats were performed on a separate workstation.    COMPARISON: CT of the abdomen and pelvis from 10/7/2021.    FINDINGS:    Bone: An acute comminuted and mildly displaced fracture of the left   superior pubic ramus is seen extending into the left pubic symphysis and   left inferior pubic ramus. An acute comminuted and nondisplaced left   sacral ala fracture is noted. An old right superior and inferior pubic   ramus fractures are seen. No additional fracture or dislocation is   demonstrated.    Soft tissues: Mild subcutaneous inflammatory change is seen in the   lateral left hip. Enlargement of the left adductor musculature is noted   likely due to muscle strain and/or trauma.    Miscellaneous: Colonic diverticulosis is noted without diverticulitis.   The patient is status post a supracervical hysterectomy. No adnexal   masses are present. Urinary bladder is distended. Mild inflammatory   change is seen in the inferior left pelvis superior to the left superior   pubic ramus fracture.    IMPRESSION:    1. Acute comminuted and mildly displaced fracture of the left superior   pubic ramus extending into the left pubic symphysis and left inferior   pubic ramus.  2. Acute comminuted and nondisplaced left sacral ala fracture.    --- End of Report ---            GRAZYNA BAÑUELOS MD; Attending Radiologist  This document has been electronically signed. Dec 18 2023  6:17AM    < end of copied text >  Consultant(s) Notes Reviewed:   YES      Plan of care was discussed with patient and /or primary care giver; all questions and concerns were addressed  NP Note discussed with  Primary Attending    INTERVAL HPI/OVERNIGHT EVENTS: Pt seen in bed, complaints of pain to back and hip area. pt is interested in rehab facility as she lives alone and no help at home.     MEDICATIONS  (STANDING):  clonazePAM  Tablet 1 milliGRAM(s) Oral at bedtime  cyclobenzaprine 5 milliGRAM(s) Oral three times a day  enoxaparin Injectable 30 milliGRAM(s) SubCutaneous every 24 hours  influenza  Vaccine (HIGH DOSE) 0.7 milliLiter(s) IntraMuscular once  lidocaine   4% Patch 1 Patch Transdermal daily  valsartan 40 milliGRAM(s) Oral daily    MEDICATIONS  (PRN):  clonazePAM  Tablet 0.5 milliGRAM(s) Oral daily PRN anxiety  oxycodone    5 mG/acetaminophen 325 mG 1 Tablet(s) Oral every 4 hours PRN Severe Pain (7 - 10)  polyethylene glycol 3350 17 Gram(s) Oral daily PRN Constipation  senna 2 Tablet(s) Oral at bedtime PRN Constipation  simethicone 80 milliGRAM(s) Chew two times a day PRN Gas      __________________________________________________  REVIEW OF SYSTEMS:    CONSTITUTIONAL: No fever,   EYES: no acute visual disturbances  NECK: No pain or stiffness  RESPIRATORY: No cough; No shortness of breath  CARDIOVASCULAR: No chest pain, no palpitations  GASTROINTESTINAL: No pain. No nausea or vomiting; No diarrhea   NEUROLOGICAL: No headache or numbness, no tremors  MUSCULOSKELETAL: back and hip pain 6/10  GENITOURINARY: no dysuria, no frequency, no hesitancy  PSYCHIATRY: no depression , no anxiety  ALL OTHER  ROS negative        Vital Signs Last 24 Hrs  T(C): 37.1 (20 Dec 2023 05:37), Max: 37.1 (20 Dec 2023 05:37)  T(F): 98.8 (20 Dec 2023 05:37), Max: 98.8 (20 Dec 2023 05:37)  HR: 101 (20 Dec 2023 05:37) (92 - 101)  BP: 128/84 (20 Dec 2023 05:37) (88/52 - 137/79)  BP(mean): --  RR: 18 (20 Dec 2023 05:37) (16 - 18)  SpO2: 95% (20 Dec 2023 05:37) (95% - 96%)    Parameters below as of 20 Dec 2023 05:37  Patient On (Oxygen Delivery Method): room air        ________________________________________________  PHYSICAL EXAM:  GENERAL: NAD, frail female  HEENT: Normocephalic;  conjunctivae and sclerae clear; moist mucous membranes;   NECK : supple  CHEST/LUNG: Clear to auscultation bilaterally with good air entry   HEART: S1 S2  regular; no murmurs, gallops or rubs  ABDOMEN: Soft, Nontender, Nondistended; Bowel sounds present  musk: limited ROM due to pain to hips/back  EXTREMITIES: no cyanosis; no edema; no calf tenderness  SKIN: warm and dry; no rash, scattered old scars/pigmentation to lower legs.   NERVOUS SYSTEM:  Awake and alert; Oriented  to place, person and time ; no new deficits    _________________________________________________  LABS:                        10.4   9.16  )-----------( 150      ( 20 Dec 2023 05:35 )             31.9     12-20    138  |  105  |  11  ----------------------------<  114<H>  3.9   |  25  |  0.57    Ca    8.3<L>      20 Dec 2023 05:35  Phos  3.5     12-19  Mg     2.2     12-19    TPro  6.5  /  Alb  3.0<L>  /  TBili  0.6  /  DBili  x   /  AST  20  /  ALT  24  /  AlkPhos  57  12-19      Urinalysis Basic - ( 20 Dec 2023 05:35 )    Color: x / Appearance: x / SG: x / pH: x  Gluc: 114 mg/dL / Ketone: x  / Bili: x / Urobili: x   Blood: x / Protein: x / Nitrite: x   Leuk Esterase: x / RBC: x / WBC x   Sq Epi: x / Non Sq Epi: x / Bacteria: x      CAPILLARY BLOOD GLUCOSE    RADIOLOGY & ADDITIONAL TESTS:    Imaging  Reviewed:  YES    < from: Xray Pelvis AP only (12.18.23 @ 01:52) >    ACC: [id number] EXAM:  XR FEMUR 2 VIEWS LT   ORDERED BY: ARISTEO MALLORY     ACC: [id number] EXAM:  XR PELVIS AP ONLY 1-2 VIEWS   ORDERED BY: ARISTEO MALLORY     PROCEDURE DATE:  12/18/2023          INTERPRETATION:  Pelvis and left femur. Patient has local pain after a   fall.    Pelvis. There is slight right hip degeneration. Left hip is unremarkable.    There is an old fracture deformity of the superior and inferior right   pubic rami.    There are acute fractures of the left superior and left inferior pubic   ramus which are better demonstrated on CAT scan of the same day.    Left femur. 4 views. Left knee is unremarkable. Femurs intact.    IMPRESSION: Old right pelvic fractures.  There are new left pelvic fractures. The CAT scan also showeda left   sacral alar fracture.          --- End of Report ---            JOSE GONZALEZ MD; Attending Radiologist  This document has been electronically signed. Dec 18 2023  8:44AM    < end of copied text >    < from: CT Pelvis Bony Only No Cont (12.18.23 @ 06:05) >    ACC: [id number] EXAM:  CT PELVIS BONY ONLY   ORDERED BY: ARISTEO MALLORY     PROCEDURE DATE:  12/18/2023          INTERPRETATION:  CLINICAL INFORMATION: Left hip pain.    TECHNIQUE: CT of the pelvis was performed in bone and soft tissue windows   withcoronal and sagittal reformats. No intravenous contrast was   administered. 3-D reformats were performed on a separate workstation.    COMPARISON: CT of the abdomen and pelvis from 10/7/2021.    FINDINGS:    Bone: An acute comminuted and mildly displaced fracture of the left   superior pubic ramus is seen extending into the left pubic symphysis and   left inferior pubic ramus. An acute comminuted and nondisplaced left   sacral ala fracture is noted. An old right superior and inferior pubic   ramus fractures are seen. No additional fracture or dislocation is   demonstrated.    Soft tissues: Mild subcutaneous inflammatory change is seen in the   lateral left hip. Enlargement of the left adductor musculature is noted   likely due to muscle strain and/or trauma.    Miscellaneous: Colonic diverticulosis is noted without diverticulitis.   The patient is status post a supracervical hysterectomy. No adnexal   masses are present. Urinary bladder is distended. Mild inflammatory   change is seen in the inferior left pelvis superior to the left superior   pubic ramus fracture.    IMPRESSION:    1. Acute comminuted and mildly displaced fracture of the left superior   pubic ramus extending into the left pubic symphysis and left inferior   pubic ramus.  2. Acute comminuted and nondisplaced left sacral ala fracture.    --- End of Report ---            GRAZYNA BAÑUELOS MD; Attending Radiologist  This document has been electronically signed. Dec 18 2023  6:17AM    < end of copied text >  Consultant(s) Notes Reviewed:   YES      Plan of care was discussed with patient and /or primary care giver; all questions and concerns were addressed

## 2023-12-20 NOTE — PROGRESS NOTE ADULT - PROBLEM SELECTOR PLAN 1
Pt with left hip/pelvic pain which is somatic and neuropathic in nature due to acute comminuted and mildly displaced fracture of the left superior pubic ramus extending into the left pubic symphysis and left inferior pubic ramus. Acute comminuted and nondisplaced left sacral ala fracture s/p fall. Pt with history of osteoporosis. Pt was evaluated by NSG and Orthopedic surgery and was recommended conservative management and WBAT to BLE's with appropriate assistive device. Patient is opioid dependent.  Opioid pain recommendations   - Continue Percocet 5/325 mg PO q 4 hours PRN severe pain. Monitor for sedation/ respiratory depression.   - Continue Cyclobenzaprine 5 mg po TID for 3 days (12/21) - monitor for sedation  Non-opioid pain recommendations   - Recommend Gabapentin 100mg po q 8 hours. Monitor renal function.   - Continue Lidoderm 4% patch daily.   Bowel Regimen  - Miralax 17G PO daily  - Senna 2 tablets at bedtime for constipation  Mild pain 1 - 3  - Non-pharmacological pain treatment recommendations  - Warm/ Cool packs PRN   - Repositioning extremity, elevation, imagery, relaxation, distraction.  - Physical therapy OOB if no contraindications   Recommendations discussed with primary team and RN.

## 2023-12-20 NOTE — PHYSICAL THERAPY INITIAL EVALUATION ADULT - ACTIVE RANGE OF MOTION EXAMINATION, REHAB EVAL
Left hip flex 0-25 degrees; left knee flex 0-45 degrees/bilateral upper extremity Active ROM was WFL (within functional limits)/Right LE Active ROM was WFL (within functional limits)/deficits as listed below

## 2023-12-20 NOTE — PHYSICAL THERAPY INITIAL EVALUATION ADULT - GAIT DISTANCE, PT EVAL
Date of Service: 03/10/2021    Follow up on the left small finger.  He had a displaced bicondylar fracture treated with pinning.  An infection developed, pins were removed earlier than expected.  Today, he presents with an area along the radial PIP joint of this injured finger, which has a raised area of flesh colored tissue and is frequently bleeding.    PHYSICAL EXAMINATION:  There is a 1-2 mm punctate area of flesh colored elevation, which bleeds easily when touched.  The finger swelling generally is much less than prior and there is no warmth about the finger itself.  PIP motion is severely limited.  The PIP joint is essentially in a position of about 30-35 degrees of flexion, with almost no active movement.    We discussed his raised lesion is a pyogenic granuloma.  We discussed treatment for it, keeping the area as dry as possible.  Things such as Band-Aids or Coban will keep the skin macerated and moist, so he should try to put a Telfa with tape to avoid maceration.  Also today Silver Nitrate was applied to the area, 3 swabs of Silver Nitrate were used.  He will keep the finger dry and will check him back in 1-2 weeks.      Dictated By: Scooter Gomez DO  Signing Provider: DO KECIA Ledesma/becki ([id number])  DD: 03/10/2021 11:59:58 TD: 03/11/2021 04:36:00    Copy Sent To:    4 small steps/bed to chair

## 2023-12-20 NOTE — PROGRESS NOTE ADULT - ASSESSMENT
Confidential Drug Utilization Report  Search Terms: ROULA PASTOR, 1955 Search Date: 12/19/2023 13:53:51 PM  The Drug Utilization Report below displays all of the controlled substance prescriptions, if any, that your patient has filled in the last twelve months. The information displayed on this report is compiled from pharmacy submissions to the Department, and accurately reflects the information as submitted by the pharmacies.    This report was requested by: Bonita Christiansen | Reference #: [id number]    You have not added a KWABENA number. Keeping your KWABENA number(s) up to date on the My KWABENA # tab will enable the separation of your prescriptions from others in the search results.    Practitioner Count: 2  Pharmacy Count: 1  Current Opioid Prescriptions: 1  Current Benzodiazepine Prescriptions: 1  Current Stimulant Prescriptions: 0      Patient Demographic Information (PDI)       PDI	First Name	Last Name	Birth Date	Gender	Street Address	City	State	Zip Code  MACARIO Lopez	1955	Female	[address]	[facility]	[address]    Prescription Information      PDI Filter:    PDI	Current Rx	Drug Type	Rx Written	Rx Dispensed	Drug	Quantity	Days Supply	Prescriber Name	Prescriber KWABENA #	Payment Method	Dispenser  A	Y	B	12/06/2023	12/08/2023	clonazepam 1 mg tablet	60	30	Gina Dale	[id number]	[facility]  A	Y	O	12/07/2023	12/08/2023	oxycodone-acetaminophen 5-325 mg tablet	90	30	FenMilagros maddox MD	[id number]	[facility]  A	N	O	11/09/2023	11/09/2023	oxycodone-acetaminophen 5-325 mg tab	90	30	Milagros Ryder MD	[id number]	[facility]  A	N	B	11/06/2023	11/06/2023	clonazepam 1 mg tablet	60	30	Gina Dale	[id number]	[facility]  A	N	O	10/12/2023	10/13/2023	oxycodone-acetaminophen 5-325 mg tab	90	30	Milagros Ryder MD	[id number]	[facility]  A	N	O	09/14/2023	09/14/2023	oxycodone-acetaminophen 5-325 mg tab	120	30	Milagros Ryder MD	[id number]	[facility]  A	N	B	09/10/2023	09/12/2023	clonazepam 1 mg tablet	60	30	Dale, Gina	[id number]	[facility]  A	N	O	08/10/2023	08/10/2023	oxycodone hcl (ir) 5 mg tablet	135	30	Milagros Ryder MD	[id number]	[facility]  A	N	B	07/26/2023	07/28/2023	clonazepam 1 mg tablet	60	30	Dale, Gina	[id number]	[facility]  A	N	O	07/13/2023	07/13/2023	oxycodone hcl (ir) 10 mg tab	90	30	Milagros Ryder MD	[id number]	[facility]  A	N	O	06/27/2023	06/27/2023	oxycodone-acetaminophen  mg tab	77	19	George Gutierrez MD	[id number]	[facility]  A	N	O	06/13/2023	06/13/2023	oxycodone-acetaminophen  mg tab	55	13	George Gutierrez MD	[id number]	[facility]  A	N	B	05/24/2023	05/30/2023	clonazepam 1 mg tablet	60	30	Dale, Gina	[id number]	[facility]  A	N	O	05/23/2023	05/23/2023	oxycodone-acetaminophen  mg tablet	77	19	George Gutierrez MD	[id number]	[facility]  A	N	B	04/26/2023	04/27/2023	clonazepam 1 mg tablet	60	30	Dale, Gina	[id number]	[facility]  A	N	O	04/25/2023	04/25/2023	oxycodone-acetaminophen  mg tab	110	28	George Gutierrez MD	[id number]	[facility]  A	N	O	04/11/2023	04/11/2023	oxycodone-acetaminophen  mg tab	55	14	George Gutierrez MD	[id number]	[facility]  A	N	O	03/28/2023	03/28/2023	oxycodone-acetaminophen  mg tab	55	14	George Gutierrez MD	[id number]	[facility]  A	N	B	03/22/2023	03/22/2023	clonazepam 1 mg tablet	60	30	Dale, Gina	[id number]	[facility]  A	N	O	03/14/2023	03/14/2023	oxycodone-acetaminophen  mg tab	55	14	George Gutierrez MD	[id number]	[facility]  A	N	O	02/28/2023	02/28/2023	oxycodone-acetaminophen  mg tab	55	14	BrendaGeorge iraheta MD	[id number]	[facility]  A	N	O	02/14/2023	02/14/2023	oxycodone-acetaminophen  mg tab	55	14	BrendaGeorge iraheta MD	[id number]	[facility]  A	N	B	02/07/2023	02/07/2023	clonazepam 1 mg tablet	60	30	Dale, Gina	[id number]	[facility]  A	N	O	01/31/2023	01/31/2023	oxycodone-acetaminophen  mg tab	55	14	BrendaGeorge kendall MD	[id number]	[facility]  A	N	O	01/17/2023	01/17/2023	oxycodone-acetaminophen  mg tab	55	14	BrendaGeorge iraheta MD	[id number]	[facility]  A	N	O	01/03/2023	01/03/2023	oxycodone-acetaminophen  mg tab	55	14	BrendaGeorge iraheta MD	[id number]	[facility]  A	N	B	12/28/2022	12/30/2022	clonazepam 1 mg tablet	60	30	Dale, Gina	[id number]	[facility]  A	N	O	12/20/2022	12/20/2022	oxycodone-acetaminophen  mg tab	55	14	George Gutierrez MD	[id number]	[facility]    * - Details of Drug Type : O = Opioid, B = Benzodiazepine, S = Stimulant    * - Drugs marked with an asterisk are compound drugs. If the compound drug is made up of more than one controlled substance, then each controlled substance will be a separate row in the table.     Confidential Drug Utilization Report  Search Terms: ROULA PASTOR, 1955 Search Date: 12/19/2023 13:53:51 PM  The Drug Utilization Report below displays all of the controlled substance prescriptions, if any, that your patient has filled in the last twelve months. The information displayed on this report is compiled from pharmacy submissions to the Department, and accurately reflects the information as submitted by the pharmacies.    This report was requested by: Bonita Christiansen | Reference #: [id number]    You have not added a KWABENA number. Keeping your KWABENA number(s) up to date on the My KWABENA # tab will enable the separation of your prescriptions from others in the search results.    Practitioner Count: 2  Pharmacy Count: 1  Current Opioid Prescriptions: 1  Current Benzodiazepine Prescriptions: 1  Current Stimulant Prescriptions: 0      Patient Demographic Information (PDI)       PDI	First Name	Last Name	Birth Date	Gender	Street Address	City	State	Zip Code  MACARIO Lopez	1955	Female	[phone]	[facility]	[address]    Prescription Information      PDI Filter:    PDI	Current Rx	Drug Type	Rx Written	Rx Dispensed	Drug	Quantity	Days Supply	Prescriber Name	Prescriber KWABENA #	Payment Method	Dispenser  A	Y	B	12/06/2023	12/08/2023	clonazepam 1 mg tablet	60	30	Gina Dale	[id number]	[facility]  A	Y	O	12/07/2023	12/08/2023	oxycodone-acetaminophen 5-325 mg tablet	90	30	FenMilagros maddox MD	[id number]	[facility]  A	N	O	11/09/2023	11/09/2023	oxycodone-acetaminophen 5-325 mg tab	90	30	Milagros Ryder MD	[id number]	[facility]  A	N	B	11/06/2023	11/06/2023	clonazepam 1 mg tablet	60	30	Gina Dale	[id number]	[facility]  A	N	O	10/12/2023	10/13/2023	oxycodone-acetaminophen 5-325 mg tab	90	30	Milagros Ryder MD	[id number]	[facility]  A	N	O	09/14/2023	09/14/2023	oxycodone-acetaminophen 5-325 mg tab	120	30	Milagros Ryder MD	[id number]	[facility]  A	N	B	09/10/2023	09/12/2023	clonazepam 1 mg tablet	60	30	Dale, Gina	[id number]	[facility]  A	N	O	08/10/2023	08/10/2023	oxycodone hcl (ir) 5 mg tablet	135	30	Milagros Ryder MD	[id number]	[facility]  A	N	B	07/26/2023	07/28/2023	clonazepam 1 mg tablet	60	30	Dale, Gina	[id number]	[facility]  A	N	O	07/13/2023	07/13/2023	oxycodone hcl (ir) 10 mg tab	90	30	Milagros Ryder MD	[id number]	[facility]  A	N	O	06/27/2023	06/27/2023	oxycodone-acetaminophen  mg tab	77	19	George Gutierrez MD	[id number]	[facility]  A	N	O	06/13/2023	06/13/2023	oxycodone-acetaminophen  mg tab	55	13	George Gutierrez MD	[id number]	[facility]  A	N	B	05/24/2023	05/30/2023	clonazepam 1 mg tablet	60	30	Dale, Gina	[id number]	[facility]  A	N	O	05/23/2023	05/23/2023	oxycodone-acetaminophen  mg tablet	77	19	George Gutierrez MD	[id number]	[facility]  A	N	B	04/26/2023	04/27/2023	clonazepam 1 mg tablet	60	30	Dale, Gina	[id number]	[facility]  A	N	O	04/25/2023	04/25/2023	oxycodone-acetaminophen  mg tab	110	28	George Gutierrez MD	[id number]	[facility]  A	N	O	04/11/2023	04/11/2023	oxycodone-acetaminophen  mg tab	55	14	George Gutierrez MD	[id number]	[facility]  A	N	O	03/28/2023	03/28/2023	oxycodone-acetaminophen  mg tab	55	14	George Gutierrez MD	[id number]	[facility]  A	N	B	03/22/2023	03/22/2023	clonazepam 1 mg tablet	60	30	Dale, Gina	[id number]	[facility]  A	N	O	03/14/2023	03/14/2023	oxycodone-acetaminophen  mg tab	55	14	George Gutierrez MD	[id number]	[facility]  A	N	O	02/28/2023	02/28/2023	oxycodone-acetaminophen  mg tab	55	14	BrendaGeorge iraheta MD	[id number]	[facility]  A	N	O	02/14/2023	02/14/2023	oxycodone-acetaminophen  mg tab	55	14	BrendaGeorge iraheta MD	[id number]	[facility]  A	N	B	02/07/2023	02/07/2023	clonazepam 1 mg tablet	60	30	Dale, Gina	[id number]	[facility]  A	N	O	01/31/2023	01/31/2023	oxycodone-acetaminophen  mg tab	55	14	BrendaGeorge kendall MD	[id number]	[facility]  A	N	O	01/17/2023	01/17/2023	oxycodone-acetaminophen  mg tab	55	14	BrendaGeorge iraheta MD	[id number]	[facility]  A	N	O	01/03/2023	01/03/2023	oxycodone-acetaminophen  mg tab	55	14	BrendaGeorge iraheta MD	[id number]	[facility]  A	N	B	12/28/2022	12/30/2022	clonazepam 1 mg tablet	60	30	Dale, Gina	[id number]	[facility]  A	N	O	12/20/2022	12/20/2022	oxycodone-acetaminophen  mg tab	55	14	George Gutierrez MD	[id number]	[facility]    * - Details of Drug Type : O = Opioid, B = Benzodiazepine, S = Stimulant    * - Drugs marked with an asterisk are compound drugs. If the compound drug is made up of more than one controlled substance, then each controlled substance will be a separate row in the table.

## 2023-12-20 NOTE — PHYSICAL THERAPY INITIAL EVALUATION ADULT - GENERAL OBSERVATIONS, REHAB EVAL
Pt. received lying supine with c/o lower back and left hip and groin pain. She was just given pain meds by RN. Pt. connected to [employer]. Pt. received lying supine with c/o lower back and left hip and groin pain. She was just given pain meds by RN. Pt. connected to [employer].

## 2023-12-20 NOTE — PHYSICAL THERAPY INITIAL EVALUATION ADULT - DIAGNOSIS, PT EVAL
(ICF Model) Pt. present w/deficits in Body Structures/Function (Impairments), incl: Strength, ROM, Balance, Endurance leading to deficits in performing the below noted Activities (Limitations).

## 2023-12-20 NOTE — PROGRESS NOTE ADULT - ASSESSMENT
68 yrs old F, from home, ambulating independently, pmhx HTN, anxiety, pshx appendectomy, presented with back and hip pain s/p mechanical fall.   Pt is admitted for pain management  for  Acute comminuted and mildly displaced fracture of the left superior pubic ramus extending into the left pubic symphysis and left inferior pubic ramus. Acute comminuted and nondisplaced left sacral ala fracture.   Ortho  and neuro surgery, no intervention at this time. Pain management and PT consulted.

## 2023-12-20 NOTE — PHYSICAL THERAPY INITIAL EVALUATION ADULT - PERTINENT HX OF CURRENT PROBLEM, REHAB EVAL
Pt. admitted due to back and hip pain after a mechanical fall at home. CT scan of pelvis showed: 1. acute comminuted and mildly displaced fracture of the left superior pubic ramus extending into the left pubic symphysis and left inferior pubic ramus. 2. Acute comminuted and nondisplaced left sacral ala fracture.  Pt. was seen by ortho and recommended conservative management, WBAT on left LE. Pt. was also seen by neurosurgery and recommended conservative management with pain management for pain control and PT for strengthening WBAT.

## 2023-12-21 ENCOUNTER — TRANSCRIPTION ENCOUNTER (OUTPATIENT)
Age: 68
End: 2023-12-21

## 2023-12-21 LAB
ANION GAP SERPL CALC-SCNC: 6 MMOL/L — SIGNIFICANT CHANGE UP (ref 5–17)
ANION GAP SERPL CALC-SCNC: 6 MMOL/L — SIGNIFICANT CHANGE UP (ref 5–17)
BUN SERPL-MCNC: 12 MG/DL — SIGNIFICANT CHANGE UP (ref 7–18)
BUN SERPL-MCNC: 12 MG/DL — SIGNIFICANT CHANGE UP (ref 7–18)
CALCIUM SERPL-MCNC: 8.4 MG/DL — SIGNIFICANT CHANGE UP (ref 8.4–10.5)
CALCIUM SERPL-MCNC: 8.4 MG/DL — SIGNIFICANT CHANGE UP (ref 8.4–10.5)
CHLORIDE SERPL-SCNC: 105 MMOL/L — SIGNIFICANT CHANGE UP (ref 96–108)
CHLORIDE SERPL-SCNC: 105 MMOL/L — SIGNIFICANT CHANGE UP (ref 96–108)
CO2 SERPL-SCNC: 26 MMOL/L — SIGNIFICANT CHANGE UP (ref 22–31)
CO2 SERPL-SCNC: 26 MMOL/L — SIGNIFICANT CHANGE UP (ref 22–31)
CREAT SERPL-MCNC: 0.56 MG/DL — SIGNIFICANT CHANGE UP (ref 0.5–1.3)
CREAT SERPL-MCNC: 0.56 MG/DL — SIGNIFICANT CHANGE UP (ref 0.5–1.3)
EGFR: 101 ML/MIN/1.73M2 — SIGNIFICANT CHANGE UP
EGFR: 101 ML/MIN/1.73M2 — SIGNIFICANT CHANGE UP
GLUCOSE SERPL-MCNC: 99 MG/DL — SIGNIFICANT CHANGE UP (ref 70–99)
GLUCOSE SERPL-MCNC: 99 MG/DL — SIGNIFICANT CHANGE UP (ref 70–99)
HCT VFR BLD CALC: 30.7 % — LOW (ref 34.5–45)
HCT VFR BLD CALC: 30.7 % — LOW (ref 34.5–45)
HGB BLD-MCNC: 10 G/DL — LOW (ref 11.5–15.5)
HGB BLD-MCNC: 10 G/DL — LOW (ref 11.5–15.5)
MCHC RBC-ENTMCNC: 31.3 PG — SIGNIFICANT CHANGE UP (ref 27–34)
MCHC RBC-ENTMCNC: 31.3 PG — SIGNIFICANT CHANGE UP (ref 27–34)
MCHC RBC-ENTMCNC: 32.6 GM/DL — SIGNIFICANT CHANGE UP (ref 32–36)
MCHC RBC-ENTMCNC: 32.6 GM/DL — SIGNIFICANT CHANGE UP (ref 32–36)
MCV RBC AUTO: 96.2 FL — SIGNIFICANT CHANGE UP (ref 80–100)
MCV RBC AUTO: 96.2 FL — SIGNIFICANT CHANGE UP (ref 80–100)
NRBC # BLD: 0 /100 WBCS — SIGNIFICANT CHANGE UP (ref 0–0)
NRBC # BLD: 0 /100 WBCS — SIGNIFICANT CHANGE UP (ref 0–0)
PLATELET # BLD AUTO: 164 K/UL — SIGNIFICANT CHANGE UP (ref 150–400)
PLATELET # BLD AUTO: 164 K/UL — SIGNIFICANT CHANGE UP (ref 150–400)
POTASSIUM SERPL-MCNC: 4.1 MMOL/L — SIGNIFICANT CHANGE UP (ref 3.5–5.3)
POTASSIUM SERPL-MCNC: 4.1 MMOL/L — SIGNIFICANT CHANGE UP (ref 3.5–5.3)
POTASSIUM SERPL-SCNC: 4.1 MMOL/L — SIGNIFICANT CHANGE UP (ref 3.5–5.3)
POTASSIUM SERPL-SCNC: 4.1 MMOL/L — SIGNIFICANT CHANGE UP (ref 3.5–5.3)
RBC # BLD: 3.19 M/UL — LOW (ref 3.8–5.2)
RBC # BLD: 3.19 M/UL — LOW (ref 3.8–5.2)
RBC # FLD: 12.4 % — SIGNIFICANT CHANGE UP (ref 10.3–14.5)
RBC # FLD: 12.4 % — SIGNIFICANT CHANGE UP (ref 10.3–14.5)
SARS-COV-2 RNA SPEC QL NAA+PROBE: SIGNIFICANT CHANGE UP
SARS-COV-2 RNA SPEC QL NAA+PROBE: SIGNIFICANT CHANGE UP
SODIUM SERPL-SCNC: 137 MMOL/L — SIGNIFICANT CHANGE UP (ref 135–145)
SODIUM SERPL-SCNC: 137 MMOL/L — SIGNIFICANT CHANGE UP (ref 135–145)
WBC # BLD: 8.67 K/UL — SIGNIFICANT CHANGE UP (ref 3.8–10.5)
WBC # BLD: 8.67 K/UL — SIGNIFICANT CHANGE UP (ref 3.8–10.5)
WBC # FLD AUTO: 8.67 K/UL — SIGNIFICANT CHANGE UP (ref 3.8–10.5)
WBC # FLD AUTO: 8.67 K/UL — SIGNIFICANT CHANGE UP (ref 3.8–10.5)

## 2023-12-21 PROCEDURE — 99232 SBSQ HOSP IP/OBS MODERATE 35: CPT

## 2023-12-21 RX ORDER — ACETAMINOPHEN 500 MG
1000 TABLET ORAL EVERY 8 HOURS
Refills: 0 | Status: DISCONTINUED | OUTPATIENT
Start: 2023-12-21 | End: 2023-12-22

## 2023-12-21 RX ORDER — HYDROMORPHONE HYDROCHLORIDE 2 MG/ML
0.5 INJECTION INTRAMUSCULAR; INTRAVENOUS; SUBCUTANEOUS ONCE
Refills: 0 | Status: DISCONTINUED | OUTPATIENT
Start: 2023-12-21 | End: 2023-12-21

## 2023-12-21 RX ORDER — CYCLOBENZAPRINE HYDROCHLORIDE 10 MG/1
5 TABLET, FILM COATED ORAL THREE TIMES A DAY
Refills: 0 | Status: DISCONTINUED | OUTPATIENT
Start: 2023-12-21 | End: 2023-12-22

## 2023-12-21 RX ADMIN — Medication 1000 MILLIGRAM(S): at 21:01

## 2023-12-21 RX ADMIN — LIDOCAINE 1 PATCH: 4 CREAM TOPICAL at 11:48

## 2023-12-21 RX ADMIN — OXYCODONE HYDROCHLORIDE 7.5 MILLIGRAM(S): 5 TABLET ORAL at 17:36

## 2023-12-21 RX ADMIN — Medication 0.5 MILLIGRAM(S): at 11:47

## 2023-12-21 RX ADMIN — Medication 1000 MILLIGRAM(S): at 09:59

## 2023-12-21 RX ADMIN — OXYCODONE HYDROCHLORIDE 7.5 MILLIGRAM(S): 5 TABLET ORAL at 12:26

## 2023-12-21 RX ADMIN — POLYETHYLENE GLYCOL 3350 17 GRAM(S): 17 POWDER, FOR SOLUTION ORAL at 08:50

## 2023-12-21 RX ADMIN — LIDOCAINE 1 PATCH: 4 CREAM TOPICAL at 00:20

## 2023-12-21 RX ADMIN — OXYCODONE HYDROCHLORIDE 7.5 MILLIGRAM(S): 5 TABLET ORAL at 12:56

## 2023-12-21 RX ADMIN — ENOXAPARIN SODIUM 40 MILLIGRAM(S): 100 INJECTION SUBCUTANEOUS at 08:22

## 2023-12-21 RX ADMIN — LIDOCAINE 1 PATCH: 4 CREAM TOPICAL at 23:00

## 2023-12-21 RX ADMIN — OXYCODONE HYDROCHLORIDE 7.5 MILLIGRAM(S): 5 TABLET ORAL at 21:01

## 2023-12-21 RX ADMIN — OXYCODONE HYDROCHLORIDE 7.5 MILLIGRAM(S): 5 TABLET ORAL at 17:06

## 2023-12-21 RX ADMIN — Medication 5 MILLIGRAM(S): at 12:26

## 2023-12-21 RX ADMIN — OXYCODONE HYDROCHLORIDE 7.5 MILLIGRAM(S): 5 TABLET ORAL at 08:14

## 2023-12-21 RX ADMIN — LIDOCAINE 1 PATCH: 4 CREAM TOPICAL at 19:30

## 2023-12-21 RX ADMIN — Medication 1000 MILLIGRAM(S): at 22:01

## 2023-12-21 RX ADMIN — Medication 1 MILLIGRAM(S): at 21:01

## 2023-12-21 RX ADMIN — OXYCODONE HYDROCHLORIDE 7.5 MILLIGRAM(S): 5 TABLET ORAL at 22:01

## 2023-12-21 RX ADMIN — OXYCODONE HYDROCHLORIDE 7.5 MILLIGRAM(S): 5 TABLET ORAL at 08:44

## 2023-12-21 NOTE — PROGRESS NOTE ADULT - ASSESSMENT
Confidential Drug Utilization Report  Search Terms: ROULA PASTOR, 1955 Search Date: 12/19/2023 13:53:51 PM  The Drug Utilization Report below displays all of the controlled substance prescriptions, if any, that your patient has filled in the last twelve months. The information displayed on this report is compiled from pharmacy submissions to the Department, and accurately reflects the information as submitted by the pharmacies.    This report was requested by: Bonita Christiansen | Reference #: [id number]    You have not added a KWABENA number. Keeping your KWABENA number(s) up to date on the My KWABENA # tab will enable the separation of your prescriptions from others in the search results.    Practitioner Count: 2  Pharmacy Count: 1  Current Opioid Prescriptions: 1  Current Benzodiazepine Prescriptions: 1  Current Stimulant Prescriptions: 0      Patient Demographic Information (PDI)       PDI	First Name	Last Name	Birth Date	Gender	Street Address	City	State	Zip Code  MACARIO Lopez	1955	Female	[address]	[facility]	[address]    Prescription Information      PDI Filter:    PDI	Current Rx	Drug Type	Rx Written	Rx Dispensed	Drug	Quantity	Days Supply	Prescriber Name	Prescriber KWABENA #	Payment Method	Dispenser  A	Y	B	12/06/2023	12/08/2023	clonazepam 1 mg tablet	60	30	Gina Dale	[id number]	[facility]  A	Y	O	12/07/2023	12/08/2023	oxycodone-acetaminophen 5-325 mg tablet	90	30	FenMilagros maddox MD	[id number]	[facility]  A	N	O	11/09/2023	11/09/2023	oxycodone-acetaminophen 5-325 mg tab	90	30	Milagros Ryder MD	[id number]	[facility]  A	N	B	11/06/2023	11/06/2023	clonazepam 1 mg tablet	60	30	Gina Dale	[id number]	[facility]  A	N	O	10/12/2023	10/13/2023	oxycodone-acetaminophen 5-325 mg tab	90	30	Milagros Ryder MD	[id number]	[facility]  A	N	O	09/14/2023	09/14/2023	oxycodone-acetaminophen 5-325 mg tab	120	30	Milagros Ryder MD	[id number]	[facility]  A	N	B	09/10/2023	09/12/2023	clonazepam 1 mg tablet	60	30	Dale, Gina	[id number]	[facility]  A	N	O	08/10/2023	08/10/2023	oxycodone hcl (ir) 5 mg tablet	135	30	Milagros Ryder MD	[id number]	[facility]  A	N	B	07/26/2023	07/28/2023	clonazepam 1 mg tablet	60	30	Dale, Gina	[id number]	[facility]  A	N	O	07/13/2023	07/13/2023	oxycodone hcl (ir) 10 mg tab	90	30	Milagros Ryder MD	[id number]	[facility]  A	N	O	06/27/2023	06/27/2023	oxycodone-acetaminophen  mg tab	77	19	George Gutierrez MD	[id number]	[facility]  A	N	O	06/13/2023	06/13/2023	oxycodone-acetaminophen  mg tab	55	13	George Gutierrez MD	[id number]	[facility]  A	N	B	05/24/2023	05/30/2023	clonazepam 1 mg tablet	60	30	Dale, Gina	[id number]	[facility]  A	N	O	05/23/2023	05/23/2023	oxycodone-acetaminophen  mg tablet	77	19	George Gutierrez MD	[id number]	[facility]  A	N	B	04/26/2023	04/27/2023	clonazepam 1 mg tablet	60	30	Dale, Gina	[id number]	[facility]  A	N	O	04/25/2023	04/25/2023	oxycodone-acetaminophen  mg tab	110	28	George Gutierrez MD	[id number]	[facility]  A	N	O	04/11/2023	04/11/2023	oxycodone-acetaminophen  mg tab	55	14	George Gutierrez MD	[id number]	[facility]  A	N	O	03/28/2023	03/28/2023	oxycodone-acetaminophen  mg tab	55	14	George Gutierrez MD	[id number]	[facility]  A	N	B	03/22/2023	03/22/2023	clonazepam 1 mg tablet	60	30	Dale, Gina	[id number]	[facility]  A	N	O	03/14/2023	03/14/2023	oxycodone-acetaminophen  mg tab	55	14	George Gutierrez MD	[id number]	[facility]  A	N	O	02/28/2023	02/28/2023	oxycodone-acetaminophen  mg tab	55	14	BrendaGeorge iraheta MD	[id number]	[facility]  A	N	O	02/14/2023	02/14/2023	oxycodone-acetaminophen  mg tab	55	14	BrendaGeorge iraheta MD	[id number]	[facility]  A	N	B	02/07/2023	02/07/2023	clonazepam 1 mg tablet	60	30	Dale, Gina	[id number]	[facility]  A	N	O	01/31/2023	01/31/2023	oxycodone-acetaminophen  mg tab	55	14	BrendaGeorge kendall MD	[id number]	[facility]  A	N	O	01/17/2023	01/17/2023	oxycodone-acetaminophen  mg tab	55	14	BrendaGeorge iraheta MD	[id number]	[facility]  A	N	O	01/03/2023	01/03/2023	oxycodone-acetaminophen  mg tab	55	14	BrendaGeorge iraheta MD	[id number]	[facility]  A	N	B	12/28/2022	12/30/2022	clonazepam 1 mg tablet	60	30	Dale, Gina	[id number]	[facility]  A	N	O	12/20/2022	12/20/2022	oxycodone-acetaminophen  mg tab	55	14	George Gutierrez MD	[id number]	[facility]    * - Details of Drug Type : O = Opioid, B = Benzodiazepine, S = Stimulant    * - Drugs marked with an asterisk are compound drugs. If the compound drug is made up of more than one controlled substance, then each controlled substance will be a separate row in the table.     Confidential Drug Utilization Report  Search Terms: ROULA PASTOR, 1955 Search Date: 12/19/2023 13:53:51 PM  The Drug Utilization Report below displays all of the controlled substance prescriptions, if any, that your patient has filled in the last twelve months. The information displayed on this report is compiled from pharmacy submissions to the Department, and accurately reflects the information as submitted by the pharmacies.    This report was requested by: Bonita Christiansen | Reference #: [id number]    You have not added a KWABENA number. Keeping your KWABENA number(s) up to date on the My KWABENA # tab will enable the separation of your prescriptions from others in the search results.    Practitioner Count: 2  Pharmacy Count: 1  Current Opioid Prescriptions: 1  Current Benzodiazepine Prescriptions: 1  Current Stimulant Prescriptions: 0      Patient Demographic Information (PDI)       PDI	First Name	Last Name	Birth Date	Gender	Street Address	City	State	Zip Code  MACARIO Lopze	1955	Female	[address]	[facility]	[address]    Prescription Information      PDI Filter:    PDI	Current Rx	Drug Type	Rx Written	Rx Dispensed	Drug	Quantity	Days Supply	Prescriber Name	Prescriber KWABENA #	Payment Method	Dispenser  A	Y	B	12/06/2023	12/08/2023	clonazepam 1 mg tablet	60	30	Gina Dale	[id number]	[facility]  A	Y	O	12/07/2023	12/08/2023	oxycodone-acetaminophen 5-325 mg tablet	90	30	FenMilagros maddox MD	[id number]	[facility]  A	N	O	11/09/2023	11/09/2023	oxycodone-acetaminophen 5-325 mg tab	90	30	Milagros Ryder MD	[id number]	[facility]  A	N	B	11/06/2023	11/06/2023	clonazepam 1 mg tablet	60	30	Gina Dale	[id number]	[facility]  A	N	O	10/12/2023	10/13/2023	oxycodone-acetaminophen 5-325 mg tab	90	30	Milagros Ryder MD	[id number]	[facility]  A	N	O	09/14/2023	09/14/2023	oxycodone-acetaminophen 5-325 mg tab	120	30	Milagros Ryder MD	[id number]	[facility]  A	N	B	09/10/2023	09/12/2023	clonazepam 1 mg tablet	60	30	Dale, Gina	[id number]	[facility]  A	N	O	08/10/2023	08/10/2023	oxycodone hcl (ir) 5 mg tablet	135	30	Milagros Ryder MD	[id number]	[facility]  A	N	B	07/26/2023	07/28/2023	clonazepam 1 mg tablet	60	30	Dale, Gina	[id number]	[facility]  A	N	O	07/13/2023	07/13/2023	oxycodone hcl (ir) 10 mg tab	90	30	Milagros Ryder MD	[id number]	[facility]  A	N	O	06/27/2023	06/27/2023	oxycodone-acetaminophen  mg tab	77	19	George Gutierrez MD	[id number]	[facility]  A	N	O	06/13/2023	06/13/2023	oxycodone-acetaminophen  mg tab	55	13	George Gutierrez MD	[id number]	[facility]  A	N	B	05/24/2023	05/30/2023	clonazepam 1 mg tablet	60	30	Dale, Gina	[id number]	[facility]  A	N	O	05/23/2023	05/23/2023	oxycodone-acetaminophen  mg tablet	77	19	George Gutierrez MD	[id number]	[facility]  A	N	B	04/26/2023	04/27/2023	clonazepam 1 mg tablet	60	30	Dale, Gina	[id number]	[facility]  A	N	O	04/25/2023	04/25/2023	oxycodone-acetaminophen  mg tab	110	28	George Gutierrez MD	[id number]	[facility]  A	N	O	04/11/2023	04/11/2023	oxycodone-acetaminophen  mg tab	55	14	George Gutierrez MD	[id number]	[facility]  A	N	O	03/28/2023	03/28/2023	oxycodone-acetaminophen  mg tab	55	14	George Gutierrez MD	[id number]	[facility]  A	N	B	03/22/2023	03/22/2023	clonazepam 1 mg tablet	60	30	Dale, Gina	[id number]	[facility]  A	N	O	03/14/2023	03/14/2023	oxycodone-acetaminophen  mg tab	55	14	George Gutierrez MD	[id number]	[facility]  A	N	O	02/28/2023	02/28/2023	oxycodone-acetaminophen  mg tab	55	14	BrendaGeorge iraheta MD	[id number]	[facility]  A	N	O	02/14/2023	02/14/2023	oxycodone-acetaminophen  mg tab	55	14	BrendaGeorge iraheta MD	[id number]	[facility]  A	N	B	02/07/2023	02/07/2023	clonazepam 1 mg tablet	60	30	Dale, Gina	[id number]	[facility]  A	N	O	01/31/2023	01/31/2023	oxycodone-acetaminophen  mg tab	55	14	BrendaGeorge kendall MD	[id number]	[facility]  A	N	O	01/17/2023	01/17/2023	oxycodone-acetaminophen  mg tab	55	14	BrendaGeorge iraheta MD	[id number]	[facility]  A	N	O	01/03/2023	01/03/2023	oxycodone-acetaminophen  mg tab	55	14	BrendaGeorge iraheta MD	[id number]	[facility]  A	N	B	12/28/2022	12/30/2022	clonazepam 1 mg tablet	60	30	Dale, Gina	[id number]	[facility]  A	N	O	12/20/2022	12/20/2022	oxycodone-acetaminophen  mg tab	55	14	George Gutierrez MD	[id number]	[facility]    * - Details of Drug Type : O = Opioid, B = Benzodiazepine, S = Stimulant    * - Drugs marked with an asterisk are compound drugs. If the compound drug is made up of more than one controlled substance, then each controlled substance will be a separate row in the table.

## 2023-12-21 NOTE — DISCHARGE NOTE PROVIDER - ATTENDING DISCHARGE PHYSICAL EXAMINATION:
GENERAL: thin woman reclining in bed in no acute distress  HEAD:  Atraumatic, Normocephalic  EYES: EOMI, conjunctiva and sclera clear  NECK: Supple, No JVD  CHEST/LUNG: breathing comfortable on room air with no accessory muscle use  HEART: Regular rate and rhythm  ABDOMEN: Soft, Nontender, Nondistended; Bowel sounds present  EXTREMITIES:  2+ Peripheral Pulses, No clubbing, cyanosis, or edema  PSYCH: AAOx3  NEUROLOGY: non-focal  SKIN: left hip ecchymosis

## 2023-12-21 NOTE — PROGRESS NOTE ADULT - NS ATTEND AMEND GEN_ALL_CORE FT
I have seen and evaluated the patient at the bedside.     She says that she is very frustrated that she has had intermittent dosing of her pain medications due to lower BP. She is also frustrated to hear the report of [facility] [place] having questions about the medication report of Vapes in the H&P.    On exam she is holding quite still due to the pain but in no acute distress. Afebrile and HDS. Cardiopulmonary exam unremarkable. She has an ecchymoses overlying the left hip. No other rashes.     I have reviewed her CBC, BMP, LFTs. Hgb stable 9.9 g/dL. No leukocytosis. Renal function normal. Should stop labs    Assessment and Plan:    68 yrs old F, from home, ambulating independently, pmhx HTN, anxiety, pshx appendectomy, presented with back and hip pain s/p mechanical fall. Pt is admitted for pain management  for Acute comminuted and mildly displaced fracture of the left superior pubic ramus extending into the left pubic symphysis and left inferior pubic ramus. Acute comminuted and nondisplaced left sacral ala fracture. Evaluated by both neurosurgery and Orthopedic Surgery, recommending conservative management.    #Acute comminuted Left sacral Fracture  #Acute left pubic ramus fracture  #Acute Left Hip Pain, poorly controlled  #HTN  #Pre-DM (A1c 6.1% by report)  #Recent Appendectomy  #Constipation    -increase dose of oxycodone to 7.5 mg on 12/20, every 4 hours as needed for severe pain  -spot dose IV Dilaudid 0.5 mg for severe pain  -PT/OT assessment -> DAVID, working on choices; patient able to get to chair, encouraged  -pain control with multimodal pain regimen, appreciate Pain team  -bowel regimen with miralax and senna  -appreciate Orthopedic Surgery input    -rest of plan as above. I have seen and evaluated the patient at the bedside.     She says that she is very frustrated that she has had intermittent dosing of her pain medications due to lower BP. She is also frustrated to hear the report of [facility] [place] having questions about the medication report of Vapes in the H&P.    On exam she is holding quite still due to the pain but in no acute distress. Afebrile and HDS. Cardiopulmonary exam unremarkable. She has an ecchymoses overlying the left hip. No other rashes.     I have reviewed her CBC, BMP, LFTs. Hgb stable 9.9 g/dL. No leukocytosis. Renal function normal. Should stop labs    Assessment and Plan:    68 yrs old F, from home, ambulating independently, pmhx HTN, anxiety, pshx appendectomy, presented with back and hip pain s/p mechanical fall. Pt is admitted for pain management  for Acute comminuted and mildly displaced fracture of the left superior pubic ramus extending into the left pubic symphysis and left inferior pubic ramus. Acute comminuted and nondisplaced left sacral ala fracture. Evaluated by both neurosurgery and Orthopedic Surgery, recommending conservative management.    #Acute comminuted Left sacral Fracture  #Acute left pubic ramus fracture  #Acute Left Hip Pain, poorly controlled  #HTN  #Pre-DM (A1c 6.1% by report)  #Recent Appendectomy  #Constipation    -increase dose of oxycodone to 7.5 mg on 12/20, every 4 hours as needed for severe pain  -spot dose IV Dilaudid 0.5 mg for severe pain  -PT/OT assessment -> DAVID, working on choices; patient able to get to chair, encouraged  -pain control with multimodal pain regimen, appreciate Pain team  -bowel regimen with miralax and senna  -appreciate Orthopedic Surgery input    -rest of plan as above.

## 2023-12-21 NOTE — PROGRESS NOTE ADULT - TIME-BASED
Well-controlled, continue current medications    Singulair and zyrtec   Avoid allergens (strong smells, animals, cigarettes or carpeted areas) 47

## 2023-12-21 NOTE — PROGRESS NOTE ADULT - TIME BILLING
-two bedside visits to patient  -counseling about DC planning, review of her substance use history and clarifying she does not use any recreational substances  -DC planning  -history and exam  -chart review  -documenting the encounter
-bedside assessment  -history and exam  -documenting encounter  -working on pain regimen with patient  -DC planning

## 2023-12-21 NOTE — CHART NOTE - NSCHARTNOTEFT_GEN_A_CORE
I spoke with the patient at the bedside. She confirms that she does not smoke cigarettes or use any recreational drugs. She does not use electronic cigarettes or CBD products. She will have no issue completing an entire rehab program.

## 2023-12-21 NOTE — DISCHARGE NOTE PROVIDER - PROVIDER TOKENS
FREE:[LAST:[Facility MD at rehab],PHONE:[(   )    -],FAX:[(   )    -],FOLLOWUP:[1-3 days]],FREE:[LAST:[Britni],FIRST:[Milagros],PHONE:[(   )    -],FAX:[(   )    -],ADDRESS:[[facility]  [phone]],FOLLOWUP:[Routine]] FREE:[LAST:[Facility MD at rehab],PHONE:[(   )    -],FAX:[(   )    -],FOLLOWUP:[1-3 days]],FREE:[LAST:[Britni],FIRST:[Milagros],PHONE:[(   )    -],FAX:[(   )    -],ADDRESS:[[facility]  [phone]],FOLLOWUP:[Routine]]

## 2023-12-21 NOTE — PROGRESS NOTE ADULT - PROBLEM SELECTOR PLAN 1
s/p mechanical fall, hip and back pain  CT scan noted for  Acute comminuted and mildly displaced fracture of the left superior pubic ramus extending into the left pubic symphysis and left inferior pubic ramus.  Ortho and neuro surgery- no acute surgical intervention at this time   c/w pain regimen pr pain service   - pain  management following  - PT recs DAVID

## 2023-12-21 NOTE — PROGRESS NOTE ADULT - SUBJECTIVE AND OBJECTIVE BOX
NP Note discussed with  Primary Attending    INTERVAL HPI/OVERNIGHT EVENTS: seen at bedside, pt states her back and hip pain worsens with position changes. Current medications reviewed with pain consult.     MEDICATIONS  (STANDING):  acetaminophen     Tablet .. 1000 milliGRAM(s) Oral every 8 hours  clonazePAM  Tablet 1 milliGRAM(s) Oral at bedtime  enoxaparin Injectable 40 milliGRAM(s) SubCutaneous every 24 hours  influenza  Vaccine (HIGH DOSE) 0.7 milliLiter(s) IntraMuscular once  lidocaine   4% Patch 1 Patch Transdermal daily    MEDICATIONS  (PRN):  bisacodyl 5 milliGRAM(s) Oral every 12 hours PRN Constipation  clonazePAM  Tablet 0.5 milliGRAM(s) Oral daily PRN anxiety  cyclobenzaprine 5 milliGRAM(s) Oral three times a day PRN Muscle Spasm  oxyCODONE    IR 7.5 milliGRAM(s) Oral every 4 hours PRN Severe Pain (7 - 10)  polyethylene glycol 3350 17 Gram(s) Oral daily PRN Constipation  senna 2 Tablet(s) Oral at bedtime PRN Constipation  simethicone 80 milliGRAM(s) Chew two times a day PRN Gas      __________________________________________________  REVIEW OF SYSTEMS:    CONSTITUTIONAL: No fever,   EYES: no acute visual disturbances  NECK: No pain or stiffness  RESPIRATORY: No cough; No shortness of breath  CARDIOVASCULAR: No chest pain, no palpitations  GASTROINTESTINAL: No pain. No nausea or vomiting; No diarrhea   NEUROLOGICAL: No headache or numbness, no tremors  MUSCULOSKELETAL: b/l hip and back pain 6/10,   GENITOURINARY: no dysuria, no frequency, no hesitancy  PSYCHIATRY: no depression , no anxiety  ALL OTHER  ROS negative        Vital Signs Last 24 Hrs  T(C): 36.8 (21 Dec 2023 12:29), Max: 36.8 (21 Dec 2023 12:29)  T(F): 98.3 (21 Dec 2023 12:29), Max: 98.3 (21 Dec 2023 12:29)  HR: 59 (21 Dec 2023 12:29) (59 - 114)  BP: 123/59 (21 Dec 2023 12:29) (92/55 - 123/59)  BP(mean): 70 (20 Dec 2023 22:47) (65 - 70)  RR: 18 (21 Dec 2023 12:29) (18 - 18)  SpO2: 97% (21 Dec 2023 12:29) (95% - 97%)    Parameters below as of 21 Dec 2023 12:29  Patient On (Oxygen Delivery Method): room air        ________________________________________________  PHYSICAL EXAM:  GENERAL: NAD  HEENT: Normocephalic;  conjunctivae and sclerae clear; moist mucous membranes;   NECK : supple  CHEST/LUNG: Clear to auscultation bilaterally with good air entry   HEART: S1 S2  regular; no murmurs, gallops or rubs  ABDOMEN: Soft, Nontender, Nondistended; Bowel sounds present  EXTREMITIES: no cyanosis; no edema; no calf tenderness  Musk: ROM limited due to pain  SKIN: warm and dry; no rash  NERVOUS SYSTEM:  Awake and alert; Oriented  to place, person and time ; no new deficits    _________________________________________________  LABS:                        10.0   8.67  )-----------( 164      ( 21 Dec 2023 07:08 )             30.7     12-21    137  |  105  |  12  ----------------------------<  99  4.1   |  26  |  0.56    Ca    8.4      21 Dec 2023 07:08        Urinalysis Basic - ( 21 Dec 2023 07:08 )    Color: x / Appearance: x / SG: x / pH: x  Gluc: 99 mg/dL / Ketone: x  / Bili: x / Urobili: x   Blood: x / Protein: x / Nitrite: x   Leuk Esterase: x / RBC: x / WBC x   Sq Epi: x / Non Sq Epi: x / Bacteria: x      CAPILLARY BLOOD GLUCOSE    RADIOLOGY & ADDITIONAL TESTS:    Imaging  Reviewed:  YES    < from: Xray Pelvis AP only (12.18.23 @ 01:52) >    ACC: [id number] EXAM:  XR FEMUR 2 VIEWS LT   ORDERED BY: ARISTEO MALLORY     ACC: [id number] EXAM:  XR PELVIS AP ONLY 1-2 VIEWS   ORDERED BY: ARISTEO MALLORY     PROCEDURE DATE:  12/18/2023          INTERPRETATION:  Pelvis and left femur. Patient has local pain after a   fall.    Pelvis. There is slight right hip degeneration. Left hip is unremarkable.    There is an old fracture deformity of the superior and inferior right   pubic rami.    There are acute fractures of the left superior and left inferior pubic   ramus which are better demonstrated on CAT scan of the same day.    Left femur. 4 views. Left knee is unremarkable. Femurs intact.    IMPRESSION: Old right pelvic fractures.  There are new left pelvic fractures. The CAT scan also showeda left   sacral alar fracture.          --- End of Report ---            JOSE GONZALEZ MD; Attending Radiologist  This document has been electronically signed. Dec 18 2023  8:44AM    < end of copied text >    < from: CT Pelvis Bony Only No Cont (12.18.23 @ 06:05) >    ACC: [id number] EXAM:  CT PELVIS BONY ONLY   ORDERED BY: ARISTEO MALLORY     PROCEDURE DATE:  12/18/2023          INTERPRETATION:  CLINICAL INFORMATION: Left hip pain.    TECHNIQUE: CT of the pelvis was performed in bone and soft tissue windows   withcoronal and sagittal reformats. No intravenous contrast was   administered. 3-D reformats were performed on a separate workstation.    COMPARISON: CT of the abdomen and pelvis from 10/7/2021.    FINDINGS:    Bone: An acute comminuted and mildly displaced fracture of the left   superior pubic ramus is seen extending into the left pubic symphysis and   left inferior pubic ramus. An acute comminuted and nondisplaced left   sacral ala fracture is noted. An old right superior and inferior pubic   ramus fractures are seen. No additional fracture or dislocation is   demonstrated.    Soft tissues: Mild subcutaneous inflammatory change is seen in the   lateral left hip. Enlargement of the left adductor musculature is noted   likely due to muscle strain and/or trauma.    Miscellaneous: Colonic diverticulosis is noted without diverticulitis.   The patient is status post a supracervical hysterectomy. No adnexal   masses are present. Urinary bladder is distended. Mild inflammatory   change is seen in the inferior left pelvis superior to the left superior   pubic ramus fracture.    IMPRESSION:    1. Acute comminuted and mildly displaced fracture of the left superior   pubic ramus extending into the left pubic symphysis and left inferior   pubic ramus.  2. Acute comminuted and nondisplaced left sacral ala fracture.    --- End of Report ---            GRAZYNA BAÑUELOS MD; Attending Radiologist  This document has been electronically signed. Dec 18 2023  6:17AM    < end of copied text >  Consultant(s) Notes Reviewed:   YES      Plan of care was discussed with patient and /or primary care giver; all questions and concerns were addressed  NP Note discussed with  Primary Attending    INTERVAL HPI/OVERNIGHT EVENTS: seen at bedside, pt states her back and hip pain worsens with position changes. Current medications reviewed with pain consult.     MEDICATIONS  (STANDING):  acetaminophen     Tablet .. 1000 milliGRAM(s) Oral every 8 hours  clonazePAM  Tablet 1 milliGRAM(s) Oral at bedtime  enoxaparin Injectable 40 milliGRAM(s) SubCutaneous every 24 hours  influenza  Vaccine (HIGH DOSE) 0.7 milliLiter(s) IntraMuscular once  lidocaine   4% Patch 1 Patch Transdermal daily    MEDICATIONS  (PRN):  bisacodyl 5 milliGRAM(s) Oral every 12 hours PRN Constipation  clonazePAM  Tablet 0.5 milliGRAM(s) Oral daily PRN anxiety  cyclobenzaprine 5 milliGRAM(s) Oral three times a day PRN Muscle Spasm  oxyCODONE    IR 7.5 milliGRAM(s) Oral every 4 hours PRN Severe Pain (7 - 10)  polyethylene glycol 3350 17 Gram(s) Oral daily PRN Constipation  senna 2 Tablet(s) Oral at bedtime PRN Constipation  simethicone 80 milliGRAM(s) Chew two times a day PRN Gas      __________________________________________________  REVIEW OF SYSTEMS:    CONSTITUTIONAL: No fever,   EYES: no acute visual disturbances  NECK: No pain or stiffness  RESPIRATORY: No cough; No shortness of breath  CARDIOVASCULAR: No chest pain, no palpitations  GASTROINTESTINAL: No pain. No nausea or vomiting; No diarrhea   NEUROLOGICAL: No headache or numbness, no tremors  MUSCULOSKELETAL: b/l hip and back pain 6/10,   GENITOURINARY: no dysuria, no frequency, no hesitancy  PSYCHIATRY: no depression , no anxiety  ALL OTHER  ROS negative        Vital Signs Last 24 Hrs  T(C): 36.8 (21 Dec 2023 12:29), Max: 36.8 (21 Dec 2023 12:29)  T(F): 98.3 (21 Dec 2023 12:29), Max: 98.3 (21 Dec 2023 12:29)  HR: 59 (21 Dec 2023 12:29) (59 - 114)  BP: 123/59 (21 Dec 2023 12:29) (92/55 - 123/59)  BP(mean): 70 (20 Dec 2023 22:47) (65 - 70)  RR: 18 (21 Dec 2023 12:29) (18 - 18)  SpO2: 97% (21 Dec 2023 12:29) (95% - 97%)    Parameters below as of 21 Dec 2023 12:29  Patient On (Oxygen Delivery Method): room air        ________________________________________________  PHYSICAL EXAM:  GENERAL: NAD  HEENT: Normocephalic;  conjunctivae and sclerae clear; moist mucous membranes;   NECK : supple  CHEST/LUNG: Clear to auscultation bilaterally with good air entry   HEART: S1 S2  regular; no murmurs, gallops or rubs  ABDOMEN: Soft, Nontender, Nondistended; Bowel sounds present  EXTREMITIES: no cyanosis; no edema; no calf tenderness  Musk: ROM limited due to pain  SKIN: warm and dry; no rash  NERVOUS SYSTEM:  Awake and alert; Oriented  to place, person and time ; no new deficits    _________________________________________________  LABS:                        10.0   8.67  )-----------( 164      ( 21 Dec 2023 07:08 )             30.7     12-21    137  |  105  |  12  ----------------------------<  99  4.1   |  26  |  0.56    Ca    8.4      21 Dec 2023 07:08        Urinalysis Basic - ( 21 Dec 2023 07:08 )    Color: x / Appearance: x / SG: x / pH: x  Gluc: 99 mg/dL / Ketone: x  / Bili: x / Urobili: x   Blood: x / Protein: x / Nitrite: x   Leuk Esterase: x / RBC: x / WBC x   Sq Epi: x / Non Sq Epi: x / Bacteria: x      CAPILLARY BLOOD GLUCOSE    RADIOLOGY & ADDITIONAL TESTS:    Imaging  Reviewed:  YES    < from: Xray Pelvis AP only (12.18.23 @ 01:52) >    ACC: [id number] EXAM:  XR FEMUR 2 VIEWS LT   ORDERED BY: ARISTEO MALLORY     ACC: [id number] EXAM:  XR PELVIS AP ONLY 1-2 VIEWS   ORDERED BY: ARISTEO MALLORY     PROCEDURE DATE:  12/18/2023          INTERPRETATION:  Pelvis and left femur. Patient has local pain after a   fall.    Pelvis. There is slight right hip degeneration. Left hip is unremarkable.    There is an old fracture deformity of the superior and inferior right   pubic rami.    There are acute fractures of the left superior and left inferior pubic   ramus which are better demonstrated on CAT scan of the same day.    Left femur. 4 views. Left knee is unremarkable. Femurs intact.    IMPRESSION: Old right pelvic fractures.  There are new left pelvic fractures. The CAT scan also showeda left   sacral alar fracture.          --- End of Report ---            JOSE GONZALEZ MD; Attending Radiologist  This document has been electronically signed. Dec 18 2023  8:44AM    < end of copied text >    < from: CT Pelvis Bony Only No Cont (12.18.23 @ 06:05) >    ACC: [id number] EXAM:  CT PELVIS BONY ONLY   ORDERED BY: ARISTEO MALLORY     PROCEDURE DATE:  12/18/2023          INTERPRETATION:  CLINICAL INFORMATION: Left hip pain.    TECHNIQUE: CT of the pelvis was performed in bone and soft tissue windows   withcoronal and sagittal reformats. No intravenous contrast was   administered. 3-D reformats were performed on a separate workstation.    COMPARISON: CT of the abdomen and pelvis from 10/7/2021.    FINDINGS:    Bone: An acute comminuted and mildly displaced fracture of the left   superior pubic ramus is seen extending into the left pubic symphysis and   left inferior pubic ramus. An acute comminuted and nondisplaced left   sacral ala fracture is noted. An old right superior and inferior pubic   ramus fractures are seen. No additional fracture or dislocation is   demonstrated.    Soft tissues: Mild subcutaneous inflammatory change is seen in the   lateral left hip. Enlargement of the left adductor musculature is noted   likely due to muscle strain and/or trauma.    Miscellaneous: Colonic diverticulosis is noted without diverticulitis.   The patient is status post a supracervical hysterectomy. No adnexal   masses are present. Urinary bladder is distended. Mild inflammatory   change is seen in the inferior left pelvis superior to the left superior   pubic ramus fracture.    IMPRESSION:    1. Acute comminuted and mildly displaced fracture of the left superior   pubic ramus extending into the left pubic symphysis and left inferior   pubic ramus.  2. Acute comminuted and nondisplaced left sacral ala fracture.    --- End of Report ---            GRAZYNA BAÑUELOS MD; Attending Radiologist  This document has been electronically signed. Dec 18 2023  6:17AM    < end of copied text >  Consultant(s) Notes Reviewed:   YES      Plan of care was discussed with patient and /or primary care giver; all questions and concerns were addressed

## 2023-12-21 NOTE — DISCHARGE NOTE PROVIDER - CARE PROVIDER_API CALL
Facility MD at rehab,   Phone: (   )    -  Fax: (   )    -  Follow Up Time: 1-3 days    Milagros Ryder  [phone]  Phone: (   )    -  Fax: (   )    -  Follow Up Time: Routine   Facility MD at rehab,   Phone: (   )    -  Fax: (   )    -  Follow Up Time: 1-3 days    Milagros Ryder  [phone]  Phone: (   )    -  Fax: (   )    -  Follow Up Time: Routine

## 2023-12-21 NOTE — PROGRESS NOTE ADULT - SUBJECTIVE AND OBJECTIVE BOX
Source of information: ROULA PASTOR, Chart review  Patient language: English  : n/a    HPI:  68 yrs old F, from home, ambulating independently, pmhx HTN, anxiety, pshx appendectomy, presented with back and hip pain s/p mechanical fall. Pt stated that she is unsure of how she has fallen down, did not trip but likely when turning lost balance and hit the wall or the floor. Pt denied chest pain, palpitation, dyspnea, dizziness, visual changes, abdominal pain, N/V/D, dysuria, fever, chills, unilateral weakness or decreased sensation however has been generally weak lately s/p appendectomy and was on the physical therapy at home for regaining her strength. Pt denied saddle anesthesia, urinary or bladder incontinence however endorsed inability to urinate despite the urge.   Pt denied alcohol consumption, smoking or use of illicit drugs. She endorsed getting Morphine after the surgery which was prescribed however no longer taking.  (18 Dec 2023 08:53)    Patient is a 68y old  Female who presents with a chief complaint of pubic and sacral fracture (19 Dec 2023 11:47)    CT pelvis 12/18 demonstrated acute comminuted and mildly displaced fracture of the left superior pubic ramus extending into the left pubic symphysis and left inferior pubic ramus.  Acute comminuted and nondisplaced left sacral ala fracture.    Pt is a 68 year old female admitted for public and sacral fracture after a fall sustained at home. Pt denies LOC with fall.  Pain consulted on 12/19 for management of left hip/buttock pain and chronic back pain. Pt reports that she recently spent 6 weeks at [facility] for treatment of a ruptured appendix with surgical/medical management. States that post hospitalization she was participating in PT sessions at home to improve the strength in her LEs.  States that she was improving well however she "tripped over a cord while getting out of bed" last Sunday.  Pt reports chronic cervical and lumbar spine pain and was previously being treated with LESI and oral analgesics for chronic pain.  Pt states that she was under the care of a physician at [facility] pain management in [place] however she stopped going to the doctor as she was not pleased with the services.   Pt seen and examined at bedside this morning. Patient found laying in bed, very anxious and reports the need to get OOB today. Reports pain score 8/10 while laying in bed SCALE USED: (1-10 VNRS). Pt describes pain as aching, deep radiating to left buttock. Patient now on oxycodone 7.5mg q 4hrs PRN for severe pain. Patient reports that increased dose is helping her pain more, pain continues to be exacerbated by movement. Pt also reports chronic left ankle pain s/p left ankle surgery in 2019. Pt states takes Percocet 5/325 mg every 4-6 hours at home with good pain relief and is opioid dependent. Verified on iStop. Pt tolerating PO diet. Denies lethargy, chest pain, SOB, nausea, vomiting. Reports constipation, last BM 12/17. Patient stated goal for pain control: to be able to take deep breaths, get out of bed to chair and ambulate with tolerable pain control. Pt ambulates with rolling waker at home.    PAST MEDICAL & SURGICAL HISTORY:  Cervical disc disorder at C6-C7 level with radiculopathy  with ulnar nerve compression    Anxiety    Carpal tunnel syndrome of left wrist    Breast mass, left    Uterine fibroid    Cataract  bilateral eyes    HPV in female    Fibrocystic breast disease    Subclavian arterial stenosis  left side 2018    Foot fracture, left  2016, s/p pins/screws    Osteoporosis    Chronic pain    History of spinal fusion  cervical C5-C7 with metal hardware-2012 ( discectomy)    S/P carpal tunnel release  left-2015    S/P decompression of ulnar nerve at elbow  left -2015 ( done with carpal tunnel release)    S/P lumpectomy, left breast  2010    S/P hysterectomy  2008    H/O bilateral breast reduction surgery  2002    S/P cataract surgery, right  with artificial lenses- November 2016    S/P tonsillectomy    S/P cataract surgery, left    S/P ORIF (open reduction internal fixation) fracture  left foot    FAMILY HISTORY:  Family history of non-Hodgkin's lymphoma (Mother)    Acute myocardial infarction (Father)    Social History:   [X] Denies ETOH use, illicit drug use and smoking - quit smoking recently    Allergies  Avelox (Other)  NSAIDs (Other; Stomach Upset)  penicillin (Other)    MEDICATIONS  (STANDING):  acetaminophen     Tablet .. 1000 milliGRAM(s) Oral every 8 hours  clonazePAM  Tablet 1 milliGRAM(s) Oral at bedtime  enoxaparin Injectable 40 milliGRAM(s) SubCutaneous every 24 hours  influenza  Vaccine (HIGH DOSE) 0.7 milliLiter(s) IntraMuscular once  lidocaine   4% Patch 1 Patch Transdermal daily    MEDICATIONS  (PRN):  bisacodyl 5 milliGRAM(s) Oral every 12 hours PRN Constipation  clonazePAM  Tablet 0.5 milliGRAM(s) Oral daily PRN anxiety  cyclobenzaprine 5 milliGRAM(s) Oral three times a day PRN Muscle Spasm  oxyCODONE    IR 7.5 milliGRAM(s) Oral every 4 hours PRN Severe Pain (7 - 10)  polyethylene glycol 3350 17 Gram(s) Oral daily PRN Constipation  senna 2 Tablet(s) Oral at bedtime PRN Constipation  simethicone 80 milliGRAM(s) Chew two times a day PRN Gas    Vital Signs Last 24 Hrs  T(C): 36.7 (21 Dec 2023 05:13), Max: 36.7 (20 Dec 2023 20:48)  T(F): 98 (21 Dec 2023 05:13), Max: 98.1 (20 Dec 2023 20:48)  HR: 96 (21 Dec 2023 05:13) (96 - 114)  BP: 102/66 (21 Dec 2023 05:13) (89/56 - 106/85)  BP(mean): 70 (20 Dec 2023 22:47) (65 - 70)  RR: 18 (21 Dec 2023 05:13) (17 - 18)  SpO2: 97% (21 Dec 2023 05:13) (95% - 97%)    Parameters below as of 20 Dec 2023 20:48  Patient On (Oxygen Delivery Method): room air    LABS: Reviewed                          10.0   8.67  )-----------( 164      ( 21 Dec 2023 07:08 )             30.7     12-21    137  |  105  |  12  ----------------------------<  99  4.1   |  26  |  0.56    Ca    8.4      21 Dec 2023 07:08    Urinalysis Basic - ( 21 Dec 2023 07:08 )    Color: x / Appearance: x / SG: x / pH: x  Gluc: 99 mg/dL / Ketone: x  / Bili: x / Urobili: x   Blood: x / Protein: x / Nitrite: x   Leuk Esterase: x / RBC: x / WBC x   Sq Epi: x / Non Sq Epi: x / Bacteria: x    CAPILLARY BLOOD GLUCOSE    Radiology: Reviewed.  < from: CT Pelvis Bony Only No Cont (12.18.23 @ 06:05) >    ACC: [id number] EXAM:  CT PELVIS BONY ONLY   ORDERED BY: ARISTEO MALLORY     PROCEDURE DATE:  12/18/2023      INTERPRETATION:  CLINICAL INFORMATION: Left hip pain.    TECHNIQUE: CT of the pelvis was performed in bone and soft tissue windows   withcoronal and sagittal reformats. No intravenous contrast was   administered. 3-D reformats were performed on a separate workstation.    COMPARISON: CT of the abdomen and pelvis from 10/7/2021.    FINDINGS:    Bone: An acute comminuted and mildly displaced fracture of the left   superior pubic ramus is seen extending into the left pubic symphysis and   left inferior pubic ramus. An acute comminuted and nondisplaced left   sacral ala fracture is noted. An old right superior and inferior pubic   ramus fractures are seen. No additional fracture or dislocation is   demonstrated.    Soft tissues: Mild subcutaneous inflammatory change is seen in the   lateral left hip. Enlargement of the left adductor musculature is noted   likely due to muscle strain and/or trauma.    Miscellaneous: Colonic diverticulosis is noted without diverticulitis.   The patient is status post a supracervical hysterectomy. No adnexal   masses are present. Urinary bladder is distended. Mild inflammatory   change is seen in the inferior left pelvis superior to the left superior   pubic ramus fracture.    IMPRESSION:    1. Acute comminuted and mildly displaced fracture of the left superior   pubic ramus extending into the left pubic symphysis and left inferior   pubic ramus.  2. Acute comminuted and nondisplaced left sacral ala fracture.    --- End of Report ---    GRAZYNA BAÑUELOS MD; Attending Radiologist  This document has been electronically signed. Dec 18 2023  6:17AM    < end of copied text >    ORT Score -   Family Hx of substance abuse	Female	      Male  Alcohol 	                                           1                     3  Illegal drugs	                                   2                     3  Rx drugs                                           4 	                  4  Personal Hx of substance abuse		  Alcohol 	                                          3	                  3  Illegal drugs                                     4	                  4  Rx drugs                                            5 	                  5  Age between 16- 45 years	           1                     1  hx preadolescent sexual abuse	   3 	                  0  Psychological disease		  ADD, OCD, bipolar, schizophrenia   2	          2  Depression                                           1 	          1  Total: 1    a score of 3 or lower indicates low risk for opioid abuse		  a score of 4-7 indicates moderate risk for opioid abuse		  a score of 8 or higher indicates high risk for opioid abuse  	  REVIEW OF SYSTEMS:  CONSTITUTIONAL: No fever or fatigue  HEENT:  No difficulty hearing, no change in vision  NECK: + chronic neck pain, no neck stiffness  RESPIRATORY: No cough, wheezing, chills or hemoptysis; No shortness of breath  CARDIOVASCULAR: No chest pain, palpitations, dizziness, or leg swelling  GASTROINTESTINAL: No loss of appetite, + decreased PO intake. No abdominal or epigastric pain. No nausea, vomiting; No diarrhea. + constipation, last BM 12/17  GENITOURINARY: No dysuria, frequency, hematuria, retention or incontinence  MUSCULOSKELETAL: + left hip pain that radiates to left buttock. + "tight" sensation in left buttock. + c/o chronic lumbar back pain with history of LESI, + C spine surgery in 2013, + left hip pain with movement, no right upper or right lower motor strength weakness, no saddle anesthesia, bowel/bladder incontinence, + fall, + left upper extremity numbness with + paresthesias  NEURO: No headaches, + LUE numbness with tingling, + left ankle burning pain chronic pain, + weakness in LLE  PSYCHIATRIC: + history of anxiety/depression    PHYSICAL EXAM:  GENERAL:  Alert & Oriented X 4, cooperative, NAD, Good concentration. Speech is clear.   RESPIRATORY: Respirations even and unlabored. Clear to auscultation bilaterally  CARDIOVASCULAR: Normal S1/S2, regular rate and rhythm;   GASTROINTESTINAL:  Soft, Nontender, Nondistended; Bowel sounds present  PERIPHERAL VASCULAR:  Extremities warm without edema. 2+ Peripheral Pulses, No cyanosis, No calf tenderness  MUSCULOSKELETAL: Motor Strength 5/5 B/L upper and RLE, 3/5 left lower extremity; ROM intact in BUE and right LE, + SLR at 40 degrees in LLE; + tenderness on palpation of left hip, left pubic   SKIN: Warm, dry, intact.  + ecchymosis in left hip    Risk factors associated with adverse outcomes related to opioid treatment  [X]  Concurrent benzodiazepine use  [ ]  History/ Active substance use or alcohol use disorder  [X] Psychiatric co-morbidity  [ ] Sleep apnea  [ ] COPD  [ ] BMI> 35  [ ] Liver dysfunction  [ ] Renal dysfunction  [ ] CHF  [ ] Smoker  [X]  Age > 60 years    [X]  [place]  Reviewed and Copied to Chart. See below.    Plan of care and goal oriented pain management treatment options were discussed with patient and /or primary care giver; all questions and concerns were addressed and care was aligned with patient's wishes.    Educated patient on goal oriented pain management treatment options      Source of information: ROULA PASTOR, Chart review  Patient language: English  : n/a    HPI:  68 yrs old F, from home, ambulating independently, pmhx HTN, anxiety, pshx appendectomy, presented with back and hip pain s/p mechanical fall. Pt stated that she is unsure of how she has fallen down, did not trip but likely when turning lost balance and hit the wall or the floor. Pt denied chest pain, palpitation, dyspnea, dizziness, visual changes, abdominal pain, N/V/D, dysuria, fever, chills, unilateral weakness or decreased sensation however has been generally weak lately s/p appendectomy and was on the physical therapy at home for regaining her strength. Pt denied saddle anesthesia, urinary or bladder incontinence however endorsed inability to urinate despite the urge.   Pt denied alcohol consumption, smoking or use of illicit drugs. She endorsed getting Morphine after the surgery which was prescribed however no longer taking.  (18 Dec 2023 08:53)    Patient is a 68y old  Female who presents with a chief complaint of pubic and sacral fracture (19 Dec 2023 11:47)    CT pelvis 12/18 demonstrated acute comminuted and mildly displaced fracture of the left superior pubic ramus extending into the left pubic symphysis and left inferior pubic ramus.  Acute comminuted and nondisplaced left sacral ala fracture.    Pt is a 68 year old female admitted for public and sacral fracture after a fall sustained at home. Pt denies LOC with fall.  Pain consulted on 12/19 for management of left hip/buttock pain and chronic back pain. Pt reports that she recently spent 6 weeks at [facility] for treatment of a ruptured appendix with surgical/medical management. States that post hospitalization she was participating in PT sessions at home to improve the strength in her LEs.  States that she was improving well however she "tripped over a cord while getting out of bed" last Sunday.  Pt reports chronic cervical and lumbar spine pain and was previously being treated with LESI and oral analgesics for chronic pain.  Pt states that she was under the care of a physician at [facility] pain management in [place] however she stopped going to the doctor as she was not pleased with the services.   Pt seen and examined at bedside this morning. Patient found laying in bed, very anxious and reports the need to get OOB today. Reports pain score 8/10 while laying in bed SCALE USED: (1-10 VNRS). Pt describes pain as aching, deep radiating to left buttock. Patient now on oxycodone 7.5mg q 4hrs PRN for severe pain. Patient reports that increased dose is helping her pain more, pain continues to be exacerbated by movement. Pt also reports chronic left ankle pain s/p left ankle surgery in 2019. Pt states takes Percocet 5/325 mg every 4-6 hours at home with good pain relief and is opioid dependent. Verified on iStop. Pt tolerating PO diet. Denies lethargy, chest pain, SOB, nausea, vomiting. Reports constipation, last BM 12/17. Patient stated goal for pain control: to be able to take deep breaths, get out of bed to chair and ambulate with tolerable pain control. Pt ambulates with rolling waker at home.    PAST MEDICAL & SURGICAL HISTORY:  Cervical disc disorder at C6-C7 level with radiculopathy  with ulnar nerve compression    Anxiety    Carpal tunnel syndrome of left wrist    Breast mass, left    Uterine fibroid    Cataract  bilateral eyes    HPV in female    Fibrocystic breast disease    Subclavian arterial stenosis  left side 2018    Foot fracture, left  2016, s/p pins/screws    Osteoporosis    Chronic pain    History of spinal fusion  cervical C5-C7 with metal hardware-2012 ( discectomy)    S/P carpal tunnel release  left-2015    S/P decompression of ulnar nerve at elbow  left -2015 ( done with carpal tunnel release)    S/P lumpectomy, left breast  2010    S/P hysterectomy  2008    H/O bilateral breast reduction surgery  2002    S/P cataract surgery, right  with artificial lenses- November 2016    S/P tonsillectomy    S/P cataract surgery, left    S/P ORIF (open reduction internal fixation) fracture  left foot    FAMILY HISTORY:  Family history of non-Hodgkin's lymphoma (Mother)    Acute myocardial infarction (Father)    Social History:   [X] Denies ETOH use, illicit drug use and smoking - quit smoking recently    Allergies  Avelox (Other)  NSAIDs (Other; Stomach Upset)  penicillin (Other)    MEDICATIONS  (STANDING):  acetaminophen     Tablet .. 1000 milliGRAM(s) Oral every 8 hours  clonazePAM  Tablet 1 milliGRAM(s) Oral at bedtime  enoxaparin Injectable 40 milliGRAM(s) SubCutaneous every 24 hours  influenza  Vaccine (HIGH DOSE) 0.7 milliLiter(s) IntraMuscular once  lidocaine   4% Patch 1 Patch Transdermal daily    MEDICATIONS  (PRN):  bisacodyl 5 milliGRAM(s) Oral every 12 hours PRN Constipation  clonazePAM  Tablet 0.5 milliGRAM(s) Oral daily PRN anxiety  cyclobenzaprine 5 milliGRAM(s) Oral three times a day PRN Muscle Spasm  oxyCODONE    IR 7.5 milliGRAM(s) Oral every 4 hours PRN Severe Pain (7 - 10)  polyethylene glycol 3350 17 Gram(s) Oral daily PRN Constipation  senna 2 Tablet(s) Oral at bedtime PRN Constipation  simethicone 80 milliGRAM(s) Chew two times a day PRN Gas    Vital Signs Last 24 Hrs  T(C): 36.7 (21 Dec 2023 05:13), Max: 36.7 (20 Dec 2023 20:48)  T(F): 98 (21 Dec 2023 05:13), Max: 98.1 (20 Dec 2023 20:48)  HR: 96 (21 Dec 2023 05:13) (96 - 114)  BP: 102/66 (21 Dec 2023 05:13) (89/56 - 106/85)  BP(mean): 70 (20 Dec 2023 22:47) (65 - 70)  RR: 18 (21 Dec 2023 05:13) (17 - 18)  SpO2: 97% (21 Dec 2023 05:13) (95% - 97%)    Parameters below as of 20 Dec 2023 20:48  Patient On (Oxygen Delivery Method): room air    LABS: Reviewed                          10.0   8.67  )-----------( 164      ( 21 Dec 2023 07:08 )             30.7     12-21    137  |  105  |  12  ----------------------------<  99  4.1   |  26  |  0.56    Ca    8.4      21 Dec 2023 07:08    Urinalysis Basic - ( 21 Dec 2023 07:08 )    Color: x / Appearance: x / SG: x / pH: x  Gluc: 99 mg/dL / Ketone: x  / Bili: x / Urobili: x   Blood: x / Protein: x / Nitrite: x   Leuk Esterase: x / RBC: x / WBC x   Sq Epi: x / Non Sq Epi: x / Bacteria: x    CAPILLARY BLOOD GLUCOSE    Radiology: Reviewed.  < from: CT Pelvis Bony Only No Cont (12.18.23 @ 06:05) >    ACC: [id number] EXAM:  CT PELVIS BONY ONLY   ORDERED BY: ARISTEO MALLORY     PROCEDURE DATE:  12/18/2023      INTERPRETATION:  CLINICAL INFORMATION: Left hip pain.    TECHNIQUE: CT of the pelvis was performed in bone and soft tissue windows   withcoronal and sagittal reformats. No intravenous contrast was   administered. 3-D reformats were performed on a separate workstation.    COMPARISON: CT of the abdomen and pelvis from 10/7/2021.    FINDINGS:    Bone: An acute comminuted and mildly displaced fracture of the left   superior pubic ramus is seen extending into the left pubic symphysis and   left inferior pubic ramus. An acute comminuted and nondisplaced left   sacral ala fracture is noted. An old right superior and inferior pubic   ramus fractures are seen. No additional fracture or dislocation is   demonstrated.    Soft tissues: Mild subcutaneous inflammatory change is seen in the   lateral left hip. Enlargement of the left adductor musculature is noted   likely due to muscle strain and/or trauma.    Miscellaneous: Colonic diverticulosis is noted without diverticulitis.   The patient is status post a supracervical hysterectomy. No adnexal   masses are present. Urinary bladder is distended. Mild inflammatory   change is seen in the inferior left pelvis superior to the left superior   pubic ramus fracture.    IMPRESSION:    1. Acute comminuted and mildly displaced fracture of the left superior   pubic ramus extending into the left pubic symphysis and left inferior   pubic ramus.  2. Acute comminuted and nondisplaced left sacral ala fracture.    --- End of Report ---    GRAZYNA BAÑUELOS MD; Attending Radiologist  This document has been electronically signed. Dec 18 2023  6:17AM    < end of copied text >    ORT Score -   Family Hx of substance abuse	Female	      Male  Alcohol 	                                           1                     3  Illegal drugs	                                   2                     3  Rx drugs                                           4 	                  4  Personal Hx of substance abuse		  Alcohol 	                                          3	                  3  Illegal drugs                                     4	                  4  Rx drugs                                            5 	                  5  Age between 16- 45 years	           1                     1  hx preadolescent sexual abuse	   3 	                  0  Psychological disease		  ADD, OCD, bipolar, schizophrenia   2	          2  Depression                                           1 	          1  Total: 1    a score of 3 or lower indicates low risk for opioid abuse		  a score of 4-7 indicates moderate risk for opioid abuse		  a score of 8 or higher indicates high risk for opioid abuse  	  REVIEW OF SYSTEMS:  CONSTITUTIONAL: No fever or fatigue  HEENT:  No difficulty hearing, no change in vision  NECK: + chronic neck pain, no neck stiffness  RESPIRATORY: No cough, wheezing, chills or hemoptysis; No shortness of breath  CARDIOVASCULAR: No chest pain, palpitations, dizziness, or leg swelling  GASTROINTESTINAL: No loss of appetite, + decreased PO intake. No abdominal or epigastric pain. No nausea, vomiting; No diarrhea. + constipation, last BM 12/17  GENITOURINARY: No dysuria, frequency, hematuria, retention or incontinence  MUSCULOSKELETAL: + left hip pain that radiates to left buttock. + "tight" sensation in left buttock. + c/o chronic lumbar back pain with history of LESI, + C spine surgery in 2013, + left hip pain with movement, no right upper or right lower motor strength weakness, no saddle anesthesia, bowel/bladder incontinence, + fall, + left upper extremity numbness with + paresthesias  NEURO: No headaches, + LUE numbness with tingling, + left ankle burning pain chronic pain, + weakness in LLE  PSYCHIATRIC: + history of anxiety/depression    PHYSICAL EXAM:  GENERAL:  Alert & Oriented X 4, cooperative, NAD, Good concentration. Speech is clear.   RESPIRATORY: Respirations even and unlabored. Clear to auscultation bilaterally  CARDIOVASCULAR: Normal S1/S2, regular rate and rhythm;   GASTROINTESTINAL:  Soft, Nontender, Nondistended; Bowel sounds present  PERIPHERAL VASCULAR:  Extremities warm without edema. 2+ Peripheral Pulses, No cyanosis, No calf tenderness  MUSCULOSKELETAL: Motor Strength 5/5 B/L upper and RLE, 3/5 left lower extremity; ROM intact in BUE and right LE, + SLR at 40 degrees in LLE; + tenderness on palpation of left hip, left pubic   SKIN: Warm, dry, intact.  + ecchymosis in left hip    Risk factors associated with adverse outcomes related to opioid treatment  [X]  Concurrent benzodiazepine use  [ ]  History/ Active substance use or alcohol use disorder  [X] Psychiatric co-morbidity  [ ] Sleep apnea  [ ] COPD  [ ] BMI> 35  [ ] Liver dysfunction  [ ] Renal dysfunction  [ ] CHF  [ ] Smoker  [X]  Age > 60 years    [X]  [place]  Reviewed and Copied to Chart. See below.    Plan of care and goal oriented pain management treatment options were discussed with patient and /or primary care giver; all questions and concerns were addressed and care was aligned with patient's wishes.    Educated patient on goal oriented pain management treatment options

## 2023-12-21 NOTE — DISCHARGE NOTE PROVIDER - HOSPITAL COURSE
68 yrs old F, from home, ambulating independently, pmhx HTN, anxiety, pshx appendectomy, presented with back and hip pain s/p mechanical fall.   Pt is admitted for pain management  for  Acute comminuted and mildly displaced fracture of the left superior pubic ramus extending into the left pubic symphysis and left inferior pubic ramus. Acute comminuted and nondisplaced left sacral ala fracture.   Ortho  and neuro surgery, no intervention at this time. Pain management and PT consulted and recs [facility].   Pt is noted hypotensive. Orthostatic BP ordered. Monitoring continues with narcotics prn might cause low BP.     incomplete 12/21   68 yrs old F, from home, ambulating independently, pmhx HTN, anxiety, pshx appendectomy, presented with back and hip pain s/p mechanical fall.   Pt is admitted for pain management  for  Acute comminuted and mildly displaced fracture of the left superior pubic ramus extending into the left pubic symphysis and left inferior pubic ramus. Acute comminuted and nondisplaced left sacral ala fracture.   Ortho  and neuro surgery, no intervention at this time recommended. Pain management consulted for pain control, Bowel regiment continues with pain medications.   PT consulted and recs [facility]. CM followed for placement.   Pt is noted hypotensive episodically. Orthostatic BP ordered but difficulty to perform due to pain. Monitoring continues with narcotics prn might cause low BP. Discontinued Valsartan and BP improved to normal range without medication. Patient is advised to follow up with PCP for routine check up.   Patient is medically optimized for discharge to [facility].   Discussed discharge plan with attending.   Please refer to medical records for in depth hospital course. 68 yrs old F, from home, ambulating independently, pmhx HTN, anxiety, pshx appendectomy, presented with back and hip pain s/p mechanical fall.   Pt is admitted for pain management  for  Acute comminuted and mildly displaced fracture of the left superior pubic ramus extending into the left pubic symphysis and left inferior pubic ramus. Acute comminuted and nondisplaced left sacral ala fracture.   Ortho  and neuro surgery, no intervention at this time recommended. Pain management consulted for pain control, Bowel regiment continues with pain medications.   PT consulted and recs [facility]. CM followed for placement.   Pt is noted hypotensive episodically. Orthostatic BP ordered but difficulty to perform due to pain. Monitoring continues with narcotics prn might cause low BP. Discontinued Valsartan and BP improved to normal range without medication. Patient is advised to follow up with PCP for routine check up.   Patient is medically optimized for discharge to [facility].   Discussed discharge plan with attending.   Please refer to medical records for in depth hospital course. 68 yrs old F, from home, ambulating independently, pmhx HTN, anxiety, pshx appendectomy, presented with back and hip pain s/p mechanical fall.   Pt is admitted for pain management  for  Acute comminuted and mildly displaced fracture of the left superior pubic ramus extending into the left pubic symphysis and left inferior pubic ramus. Acute comminuted and nondisplaced left sacral ala fracture.   Ortho  and neuro surgery, no intervention at this time recommended. Pain management consulted for pain control, Bowel regiment continues with pain medications.   PT consulted and recs [facility]. CM followed for placement.   Pt is noted hypotensive episodically. Orthostatic BP ordered but difficulty to perform due to pain. Monitoring continues with narcotics prn might cause low BP. Discontinued Valsartan and BP improved to normal range without medication. Patient is advised to follow up with PCP for routine check up.   Patient is medically optimized for discharge to [facility].   Discussed discharge plan with attending.   Please refer to medical records for in depth hospital course.     Attending Attestation:     Agree with the above. Patient was admitted primarily for pain control and physical therapy evaluation following a fall that was complicated by acute comminuted and mildly displaced fracture of the left superior pubic ramus as well as acute comminuted and nondisplaced left sacral ala fracture. Both Orthopedic Surgery and Neurosurgery evaluated her in house and recommended conservative non-operative management. The pain management team assisted with her pain medications while hospitalized. Physical therapy evaluated the patient and recommended [facility]. The patient will DC on 12/22/23 to [facility] for rehabilitation. She will DC on a multimodal oral pain medication regimen that includes oxycodone. Reasonable for patient to receive low dose IV Dilaudid for breakthrough pain for a few days while at [facility]. 68 yrs old F, from home, ambulating independently, pmhx HTN, anxiety, pshx appendectomy, presented with back and hip pain s/p mechanical fall.   Pt is admitted for pain management  for  Acute comminuted and mildly displaced fracture of the left superior pubic ramus extending into the left pubic symphysis and left inferior pubic ramus. Acute comminuted and nondisplaced left sacral ala fracture.   Ortho  and neuro surgery, no intervention at this time recommended. Pain management consulted for pain control, Bowel regiment continues with pain medications.   PT consulted and recs [facility]. CM followed for placement.   Pt is noted hypotensive episodically. Orthostatic BP ordered but difficulty to perform due to pain. Monitoring continues with narcotics prn might cause low BP. Discontinued Valsartan and BP improved to normal range without medication. Patient is advised to follow up with PCP for routine check up.   Patient is medically optimized for discharge to [facility].   Discussed discharge plan with attending.   Please refer to medical records for in depth hospital course.     Attending Attestation:     Agree with the above. Patient was admitted primarily for pain control and physical therapy evaluation following a fall that was complicated by acute comminuted and mildly displaced fracture of the left superior pubic ramus as well as acute comminuted and nondisplaced left sacral ala fracture. Both Orthopedic Surgery and Neurosurgery evaluated her in house and recommended conservative non-operative management. The pain management team assisted with her pain medications while hospitalized. Physical therapy evaluated the patient and recommended [facility]. The patient will DC on 12/22/23 to [facility] for rehabilitation. She will DC on a multimodal oral pain medication regimen that includes oxycodone. Reasonable for patient to receive low dose IV Dilaudid for breakthrough pain for a few days while at [facility].

## 2023-12-21 NOTE — PROGRESS NOTE ADULT - PROBLEM SELECTOR PLAN 6
from home, lives alone  PT consulted  pt requesting [facility]ID due to pt lives alone and unsafe home environment with lots of stairs.   CM following.
from home, lives alone,  unsafe home environment with lots of stairs.   PT recs [facility], CM following. f/u COVID  Pain management, orthostatic VS

## 2023-12-21 NOTE — PROGRESS NOTE ADULT - PROBLEM SELECTOR PLAN 3
hx of HTN Valsartan 80 mg however takes half the dose  in ED afebrile, HR 88, /85, Sat 98% on RA   repeated blood pressure showed -130   c/w Valsartan 40 mg with holding parameters and titrate up if BP sustained >140/90
hx of HTN Valsartan 80 mg however takes half the dose  in ED afebrile, HR 88, /85, Sat 98% on RA   repeated blood pressure showed -130   c/w Valsartan 40 mg with holding parameters and titrate up if BP sustained >140/90
hx of HTN Valsartan 80 mg however takes half the dose  in ED afebrile, HR 88, /85, Sat 98% on RA   repeated blood pressure showed -130   D/C'd Valsartan 40 mg due to hypotension SBP 90s.   s/p IVF  reviewed narcotic prn, not likely from oxy  monitor orthostatic

## 2023-12-21 NOTE — DISCHARGE NOTE PROVIDER - NSDCMRMEDTOKEN_GEN_ALL_CORE_FT
alpha-lipoic acid 300 mg oral capsule: 1 cap(s) orally 2 times a day  aspirin 81 mg oral tablet: 1 tab(s) orally once a day  cyanocobalamin 5000 mcg sublingual tablet: 1 tab(s) orally once a day  folic acid 1 mg oral tablet: 1 tab(s) orally once a day  KlonoPIN 1 mg oral tablet: 1 tab(s) orally 2 times a day  Percocet 5/325 oral tablet: 1 tab(s) orally 3 times a day  valsartan 80 mg oral tablet: 1 tab(s) orally once a day  Vitamin B6 100 mg oral tablet: 2 tab(s) orally once a day   acetaminophen 500 mg oral tablet: 2 tab(s) orally every 8 hours  bisacodyl 5 mg oral delayed release tablet: 1 tab(s) orally every 12 hours As needed Constipation  clonazePAM 0.5 mg oral tablet: 1 tab(s) orally once a day As needed anxiety  clonazePAM 1 mg oral tablet: 1 tab(s) orally once a day (at bedtime)  cyanocobalamin 5000 mcg sublingual tablet: 1 tab(s) orally once a day  cyclobenzaprine 5 mg oral tablet: 1 tab(s) orally 3 times a day As needed Muscle Spasm  folic acid 1 mg oral tablet: 1 tab(s) orally once a day  lidocaine 4% topical film: Apply topically to affected area once a day to back x 14 days  oxyCODONE 7.5 mg oral tablet: 1 tab(s) orally every 6 hours as needed for Severe Pain (7 - 10) MDD: 4  polyethylene glycol 3350 oral powder for reconstitution: 17 gram(s) orally once a day As needed Constipation  senna leaf extract oral tablet: 2 tab(s) orally once a day (at bedtime) As needed Constipation  Vitamin B6 100 mg oral tablet: 2 tab(s) orally once a day

## 2023-12-21 NOTE — PROGRESS NOTE ADULT - PROBLEM SELECTOR PLAN 1
Pt with left hip/pelvic pain which is somatic and neuropathic in nature due to acute comminuted and mildly displaced fracture of the left superior pubic ramus extending into the left pubic symphysis and left inferior pubic ramus. Acute comminuted and nondisplaced left sacral ala fracture s/p fall. Pt with history of osteoporosis. Pt was evaluated by NSG and Orthopedic surgery and was recommended conservative management and WBAT to BLE's with appropriate assistive device. Patient is opioid dependent.  Opioid pain recommendations   - Continue Oxycodone 7.5mg PO q 4hrs PRN for severe pain, as ordered by primary team. Monitor for respiratory depression and sedation  Non-opioid pain recommendations   - Start Flexeril 5mg TID PRN for muscle spasms.  - Continue Lidoderm 4% patch daily. (12hrs on/12 hrs off)  - Start Acetaminophen 1g q 8hrs x 3 days. Monitor LFTs  Bowel Regimen  - Start dulcolax 5mg q 12 PRN for constipation   - Miralax 17G PO daily  - Senna 2 tablets at bedtime for constipation  Mild pain 1 - 3  - Non-pharmacological pain treatment recommendations  - Warm/ Cool packs PRN   - Repositioning extremity, elevation, imagery, relaxation, distraction.  - Physical therapy OOB if no contraindications   Recommendations discussed with primary team and RN. Patient has outpatient pain management provider should follow up after discharge for ongoing management.

## 2023-12-21 NOTE — PROGRESS NOTE ADULT - ASSESSMENT
68 yrs old F, from home, ambulating independently, pmhx HTN, anxiety, pshx appendectomy, presented with back and hip pain s/p mechanical fall.   Pt is admitted for pain management  for  Acute comminuted and mildly displaced fracture of the left superior pubic ramus extending into the left pubic symphysis and left inferior pubic ramus. Acute comminuted and nondisplaced left sacral ala fracture on CT finding.   Ortho  and neuro surgery consulted, no intervention at this time. Pain management consulted. PT recs [facility]. CM following.   Pt is hypotensive periodically, s/p IVF. reviewed pain medication. monitor orthostatic BP.

## 2023-12-22 ENCOUNTER — TRANSCRIPTION ENCOUNTER (OUTPATIENT)
Age: 68
End: 2023-12-22

## 2023-12-22 VITALS
HEART RATE: 114 BPM | TEMPERATURE: 98 F | DIASTOLIC BLOOD PRESSURE: 70 MMHG | RESPIRATION RATE: 17 BRPM | OXYGEN SATURATION: 99 % | SYSTOLIC BLOOD PRESSURE: 109 MMHG

## 2023-12-22 PROCEDURE — 80053 COMPREHEN METABOLIC PANEL: CPT

## 2023-12-22 PROCEDURE — 83605 ASSAY OF LACTIC ACID: CPT

## 2023-12-22 PROCEDURE — 85730 THROMBOPLASTIN TIME PARTIAL: CPT

## 2023-12-22 PROCEDURE — 96372 THER/PROPH/DIAG INJ SC/IM: CPT | Mod: XU

## 2023-12-22 PROCEDURE — 96374 THER/PROPH/DIAG INJ IV PUSH: CPT

## 2023-12-22 PROCEDURE — 99285 EMERGENCY DEPT VISIT HI MDM: CPT

## 2023-12-22 PROCEDURE — 84100 ASSAY OF PHOSPHORUS: CPT

## 2023-12-22 PROCEDURE — 87635 SARS-COV-2 COVID-19 AMP PRB: CPT

## 2023-12-22 PROCEDURE — 85027 COMPLETE CBC AUTOMATED: CPT

## 2023-12-22 PROCEDURE — 99239 HOSP IP/OBS DSCHRG MGMT >30: CPT

## 2023-12-22 PROCEDURE — 99232 SBSQ HOSP IP/OBS MODERATE 35: CPT

## 2023-12-22 PROCEDURE — 85025 COMPLETE CBC W/AUTO DIFF WBC: CPT

## 2023-12-22 PROCEDURE — 85610 PROTHROMBIN TIME: CPT

## 2023-12-22 PROCEDURE — 73552 X-RAY EXAM OF FEMUR 2/>: CPT

## 2023-12-22 PROCEDURE — 72170 X-RAY EXAM OF PELVIS: CPT

## 2023-12-22 PROCEDURE — 86803 HEPATITIS C AB TEST: CPT

## 2023-12-22 PROCEDURE — 83735 ASSAY OF MAGNESIUM: CPT

## 2023-12-22 PROCEDURE — 80048 BASIC METABOLIC PNL TOTAL CA: CPT

## 2023-12-22 PROCEDURE — 86901 BLOOD TYPING SEROLOGIC RH(D): CPT

## 2023-12-22 PROCEDURE — 72192 CT PELVIS W/O DYE: CPT | Mod: MA

## 2023-12-22 PROCEDURE — 97162 PT EVAL MOD COMPLEX 30 MIN: CPT

## 2023-12-22 PROCEDURE — 86850 RBC ANTIBODY SCREEN: CPT

## 2023-12-22 PROCEDURE — 36415 COLL VENOUS BLD VENIPUNCTURE: CPT

## 2023-12-22 PROCEDURE — 86900 BLOOD TYPING SEROLOGIC ABO: CPT

## 2023-12-22 RX ORDER — LIDOCAINE 4 G/100G
1 CREAM TOPICAL
Qty: 0 | Refills: 0 | DISCHARGE
Start: 2023-12-22

## 2023-12-22 RX ORDER — OXYCODONE HYDROCHLORIDE 5 MG/1
1 TABLET ORAL
Qty: 20 | Refills: 0
Start: 2023-12-22 | End: 2023-12-26

## 2023-12-22 RX ORDER — CLONAZEPAM 1 MG
1 TABLET ORAL
Qty: 0 | Refills: 0 | DISCHARGE
Start: 2023-12-22

## 2023-12-22 RX ORDER — CYCLOBENZAPRINE HYDROCHLORIDE 10 MG/1
1 TABLET, FILM COATED ORAL
Qty: 0 | Refills: 0 | DISCHARGE
Start: 2023-12-22

## 2023-12-22 RX ORDER — ASPIRIN/CALCIUM CARB/MAGNESIUM 324 MG
1 TABLET ORAL
Refills: 0 | DISCHARGE

## 2023-12-22 RX ORDER — POLYETHYLENE GLYCOL 3350 17 G/17G
17 POWDER, FOR SOLUTION ORAL
Qty: 0 | Refills: 0 | DISCHARGE
Start: 2023-12-22

## 2023-12-22 RX ORDER — VALSARTAN 80 MG/1
1 TABLET ORAL
Refills: 0 | DISCHARGE

## 2023-12-22 RX ORDER — ACETAMINOPHEN 500 MG
2 TABLET ORAL
Qty: 18 | Refills: 0
Start: 2023-12-22 | End: 2023-12-24

## 2023-12-22 RX ORDER — OXYCODONE AND ACETAMINOPHEN 5; 325 MG/1; MG/1
1 TABLET ORAL
Refills: 0 | DISCHARGE

## 2023-12-22 RX ORDER — SENNA PLUS 8.6 MG/1
2 TABLET ORAL
Qty: 0 | Refills: 0 | DISCHARGE
Start: 2023-12-22

## 2023-12-22 RX ADMIN — Medication 1000 MILLIGRAM(S): at 13:48

## 2023-12-22 RX ADMIN — CYCLOBENZAPRINE HYDROCHLORIDE 5 MILLIGRAM(S): 10 TABLET, FILM COATED ORAL at 13:46

## 2023-12-22 RX ADMIN — ENOXAPARIN SODIUM 40 MILLIGRAM(S): 100 INJECTION SUBCUTANEOUS at 11:14

## 2023-12-22 RX ADMIN — OXYCODONE HYDROCHLORIDE 7.5 MILLIGRAM(S): 5 TABLET ORAL at 07:44

## 2023-12-22 RX ADMIN — Medication 1000 MILLIGRAM(S): at 06:14

## 2023-12-22 RX ADMIN — OXYCODONE HYDROCHLORIDE 7.5 MILLIGRAM(S): 5 TABLET ORAL at 17:28

## 2023-12-22 RX ADMIN — OXYCODONE HYDROCHLORIDE 7.5 MILLIGRAM(S): 5 TABLET ORAL at 11:14

## 2023-12-22 RX ADMIN — Medication 0.5 MILLIGRAM(S): at 19:59

## 2023-12-22 RX ADMIN — OXYCODONE HYDROCHLORIDE 7.5 MILLIGRAM(S): 5 TABLET ORAL at 18:33

## 2023-12-22 RX ADMIN — Medication 1000 MILLIGRAM(S): at 07:43

## 2023-12-22 RX ADMIN — LIDOCAINE 1 PATCH: 4 CREAM TOPICAL at 11:14

## 2023-12-22 RX ADMIN — OXYCODONE HYDROCHLORIDE 7.5 MILLIGRAM(S): 5 TABLET ORAL at 06:17

## 2023-12-22 NOTE — CHART NOTE - NSCHARTNOTEFT_GEN_A_CORE
I have placed a call to Dr. Britni Linares at [phone]. I was left on hold for a long period of time before disconnecting. Unclear if office is perhaps closed for the holidays. I have placed a call to Dr. Britni Linares at [phone]. I was left on hold for a long period of time before disconnecting. Unclear if office is perhaps closed for the holidays.

## 2023-12-22 NOTE — PROGRESS NOTE ADULT - PROBLEM SELECTOR PROBLEM 6
Discharge planning issues
HTN (hypertension)
HTN (hypertension)
Discharge planning issues
HTN (hypertension)

## 2023-12-22 NOTE — PROGRESS NOTE ADULT - PROBLEM SELECTOR PLAN 1
Pt with left hip/pelvic pain which is somatic and neuropathic in nature due to acute comminuted and mildly displaced fracture of the left superior pubic ramus extending into the left pubic symphysis and left inferior pubic ramus. Acute comminuted and nondisplaced left sacral ala fracture s/p fall. Pt with history of osteoporosis. Pt was evaluated by NSG and Orthopedic surgery and was recommended conservative management and WBAT to BLE's with appropriate assistive device. Patient is opioid dependent. Patient to be discharged to [facility].  Opioid pain recommendations   - Continue Oxycodone 7.5mg PO q 4hrs PRN for severe pain, as ordered by primary team. Monitor for respiratory depression and sedation  Non-opioid pain recommendations   - Continue Flexeril 5mg TID PRN for muscle spasms.  - Continue Lidoderm 4% patch daily. (12hrs on/12 hrs off)  - Continue Acetaminophen 1g q 8hrs x 3 days. Monitor LFTs  Bowel Regimen  - Continue dulcolax 5mg q 12 PRN for constipation   - Miralax 17G PO daily  - Senna 2 tablets at bedtime for constipation  Mild pain 1 - 3  - Non-pharmacological pain treatment recommendations  - Warm/ Cool packs PRN   - Repositioning extremity, elevation, imagery, relaxation, distraction.  - Physical therapy OOB if no contraindications   Recommendations discussed with primary team and RN. Patient has outpatient pain management provider should follow up after discharge for ongoing management. Pt with left hip/pelvic pain which is somatic and neuropathic in nature due to acute comminuted and mildly displaced fracture of the left superior pubic ramus extending into the left pubic symphysis and left inferior pubic ramus. Acute comminuted and nondisplaced left sacral ala fracture s/p fall. Pt with history of osteoporosis. Pt was evaluated by NSG and Orthopedic surgery and was recommended conservative management and WBAT to BLE's with appropriate assistive device. Patient is opioid dependent. Patient to be discharged to [facility].  Opioid pain recommendations   - Continue Oxycodone 7.5mg PO q 4hrs PRN for severe pain, as ordered by primary team. Monitor for respiratory depression and sedation  Non-opioid pain recommendations   - Continue Flexeril 5mg TID PRN for muscle spasms.  - Continue Lidoderm 4% patch daily. (12hrs on/12 hrs off)  - Continue Acetaminophen 1g q 8hrs x 3 days. Monitor LFTs  Bowel Regimen  - Continue dulcolax 5mg q 12 PRN for constipation   - Miralax 17G PO daily  - Senna 2 tablets at bedtime for constipation  Mild pain 1 - 3  - Non-pharmacological pain treatment recommendations  - Warm/ Cool packs PRN   - Repositioning extremity, elevation, imagery, relaxation, distraction.  - Physical therapy OOB if no contraindications   Recommendations discussed with primary team and RN. Patient has outpatient pain management provider should follow up after discharge for ongoing management.

## 2023-12-22 NOTE — PROGRESS NOTE ADULT - REASON FOR ADMISSION
pubic and sacral fracture

## 2023-12-22 NOTE — PROGRESS NOTE ADULT - PROBLEM SELECTOR PROBLEM 1
Left hip pain
Pubic ramus fracture
Left hip pain
Pubic ramus fracture
Left hip pain
Pubic ramus fracture

## 2023-12-22 NOTE — PROGRESS NOTE ADULT - SUBJECTIVE AND OBJECTIVE BOX
Source of information: ROULA PASTOR, Chart review  Patient language: English  : n/a    HPI:  68 yrs old F, from home, ambulating independently, pmhx HTN, anxiety, pshx appendectomy, presented with back and hip pain s/p mechanical fall. Pt stated that she is unsure of how she has fallen down, did not trip but likely when turning lost balance and hit the wall or the floor. Pt denied chest pain, palpitation, dyspnea, dizziness, visual changes, abdominal pain, N/V/D, dysuria, fever, chills, unilateral weakness or decreased sensation however has been generally weak lately s/p appendectomy and was on the physical therapy at home for regaining her strength. Pt denied saddle anesthesia, urinary or bladder incontinence however endorsed inability to urinate despite the urge.   Pt denied alcohol consumption, smoking or use of illicit drugs. She endorsed getting Morphine after the surgery which was prescribed however no longer taking.  (18 Dec 2023 08:53)    Patient is a 68y old  Female who presents with a chief complaint of pubic and sacral fracture (19 Dec 2023 11:47)    CT pelvis 12/18 demonstrated acute comminuted and mildly displaced fracture of the left superior pubic ramus extending into the left pubic symphysis and left inferior pubic ramus.  Acute comminuted and nondisplaced left sacral ala fracture.    Pt is a 68 year old female admitted for public and sacral fracture after a fall sustained at home. Pt denies LOC with fall.  Pain consulted on 12/19 for management of left hip/buttock pain and chronic back pain. Pt reports that she recently spent 6 weeks at [facility] for treatment of a ruptured appendix with surgical/medical management. States that post hospitalization she was participating in PT sessions at home to improve the strength in her LEs.  States that she was improving well however she "tripped over a cord while getting out of bed" last Sunday.  Pt reports chronic cervical and lumbar spine pain and was previously being treated with LESI and oral analgesics for chronic pain.  Pt states that she was under the care of a physician at [facility] pain management in [place] however she stopped going to the doctor as she was not pleased with the services.   Pt seen and examined at bedside this morning. Patient on commode reporting that she feels better since yesterday. Reports pain score 6/10 SCALE USED: (1-10 VNRS). Pt describes pain as aching, deep radiating to left buttock. Pain is alleviated by oxycodone 7.5mg and is exacerbated by movement. Pt also reports chronic left ankle pain s/p left ankle surgery in 2019. Pt states takes Percocet 5/325 mg every 4-6 hours at home with good pain relief and is opioid dependent. Verified on iStop. Pt tolerating PO diet. Denies lethargy, chest pain, SOB, nausea, vomiting, constipation. Last BM 12/22. Patient stated goal for pain control: to be able to take deep breaths, get out of bed to chair and ambulate with tolerable pain control. Pt ambulates with rolling waker at home. Patient to be discharged to [facility].    PAST MEDICAL & SURGICAL HISTORY:  Cervical disc disorder at C6-C7 level with radiculopathy  with ulnar nerve compression    Anxiety    Carpal tunnel syndrome of left wrist    Breast mass, left    Uterine fibroid    Cataract  bilateral eyes    HPV in female    Fibrocystic breast disease    Subclavian arterial stenosis  left side 2018    Foot fracture, left  2016, s/p pins/screws    Osteoporosis    Chronic pain    History of spinal fusion  cervical C5-C7 with metal hardware-2012 ( discectomy)    S/P carpal tunnel release  left-2015    S/P decompression of ulnar nerve at elbow  left -2015 ( done with carpal tunnel release)    S/P lumpectomy, left breast  2010    S/P hysterectomy  2008    H/O bilateral breast reduction surgery  2002    S/P cataract surgery, right  with artificial lenses- November 2016    S/P tonsillectomy    S/P cataract surgery, left    S/P ORIF (open reduction internal fixation) fracture  left foot    FAMILY HISTORY:  Family history of non-Hodgkin's lymphoma (Mother)    Acute myocardial infarction (Father)    Social History:   [X] Denies ETOH use, illicit drug use and smoking - quit smoking recently    Allergies  Avelox (Other)  NSAIDs (Other; Stomach Upset)  penicillin (Other)    MEDICATIONS  (STANDING):  acetaminophen     Tablet .. 1000 milliGRAM(s) Oral every 8 hours  clonazePAM  Tablet 1 milliGRAM(s) Oral at bedtime  enoxaparin Injectable 40 milliGRAM(s) SubCutaneous every 24 hours  influenza  Vaccine (HIGH DOSE) 0.7 milliLiter(s) IntraMuscular once  lidocaine   4% Patch 1 Patch Transdermal daily    MEDICATIONS  (PRN):  bisacodyl 5 milliGRAM(s) Oral every 12 hours PRN Constipation  clonazePAM  Tablet 0.5 milliGRAM(s) Oral daily PRN anxiety  cyclobenzaprine 5 milliGRAM(s) Oral three times a day PRN Muscle Spasm  oxyCODONE    IR 7.5 milliGRAM(s) Oral every 4 hours PRN Severe Pain (7 - 10)  polyethylene glycol 3350 17 Gram(s) Oral daily PRN Constipation  senna 2 Tablet(s) Oral at bedtime PRN Constipation  simethicone 80 milliGRAM(s) Chew two times a day PRN Gas    Vital Signs Last 24 Hrs  T(C): 36.4 (22 Dec 2023 05:05), Max: 36.8 (21 Dec 2023 20:35)  T(F): 97.6 (22 Dec 2023 05:05), Max: 98.3 (21 Dec 2023 20:35)  HR: 81 (22 Dec 2023 05:05) (81 - 102)  BP: 151/71 (22 Dec 2023 05:05) (90/61 - 151/71)  BP(mean): 99 (22 Dec 2023 05:05) (71 - 99)  RR: 17 (22 Dec 2023 05:05) (17 - 18)  SpO2: 99% (22 Dec 2023 05:05) (99% - 99%)    Parameters below as of 22 Dec 2023 05:05  Patient On (Oxygen Delivery Method): room air    COVID-19 PCR: NotDetec (21 Dec 2023 16:18)    LABS: Reviewed                          10.0   8.67  )-----------( 164      ( 21 Dec 2023 07:08 )             30.7     12-21    137  |  105  |  12  ----------------------------<  99  4.1   |  26  |  0.56    Ca    8.4      21 Dec 2023 07:08    Urinalysis Basic - ( 21 Dec 2023 07:08 )    Color: x / Appearance: x / SG: x / pH: x  Gluc: 99 mg/dL / Ketone: x  / Bili: x / Urobili: x   Blood: x / Protein: x / Nitrite: x   Leuk Esterase: x / RBC: x / WBC x   Sq Epi: x / Non Sq Epi: x / Bacteria: x    CAPILLARY BLOOD GLUCOSE    COVID-19 PCR: NotDetec (21 Dec 2023 16:18)    Radiology: Reviewed.  < from: CT Pelvis Bony Only No Cont (12.18.23 @ 06:05) >    ACC: [id number] EXAM:  CT PELVIS BONY ONLY   ORDERED BY: ARISTEO MALLORY     PROCEDURE DATE:  12/18/2023      INTERPRETATION:  CLINICAL INFORMATION: Left hip pain.    TECHNIQUE: CT of the pelvis was performed in bone and soft tissue windows   withcoronal and sagittal reformats. No intravenous contrast was   administered. 3-D reformats were performed on a separate workstation.    COMPARISON: CT of the abdomen and pelvis from 10/7/2021.    FINDINGS:    Bone: An acute comminuted and mildly displaced fracture of the left   superior pubic ramus is seen extending into the left pubic symphysis and   left inferior pubic ramus. An acute comminuted and nondisplaced left   sacral ala fracture is noted. An old right superior and inferior pubic   ramus fractures are seen. No additional fracture or dislocation is   demonstrated.    Soft tissues: Mild subcutaneous inflammatory change is seen in the   lateral left hip. Enlargement of the left adductor musculature is noted   likely due to muscle strain and/or trauma.    Miscellaneous: Colonic diverticulosis is noted without diverticulitis.   The patient is status post a supracervical hysterectomy. No adnexal   masses are present. Urinary bladder is distended. Mild inflammatory   change is seen in the inferior left pelvis superior to the left superior   pubic ramus fracture.    IMPRESSION:    1. Acute comminuted and mildly displaced fracture of the left superior   pubic ramus extending into the left pubic symphysis and left inferior   pubic ramus.  2. Acute comminuted and nondisplaced left sacral ala fracture.    --- End of Report ---    GRAZYNA BAÑUELOS MD; Attending Radiologist  This document has been electronically signed. Dec 18 2023  6:17AM    < end of copied text >    ORT Score -   Family Hx of substance abuse	Female	      Male  Alcohol 	                                           1                     3  Illegal drugs	                                   2                     3  Rx drugs                                           4 	                  4  Personal Hx of substance abuse		  Alcohol 	                                          3	                  3  Illegal drugs                                     4	                  4  Rx drugs                                            5 	                  5  Age between 16- 45 years	           1                     1  hx preadolescent sexual abuse	   3 	                  0  Psychological disease		  ADD, OCD, bipolar, schizophrenia   2	          2  Depression                                           1 	          1  Total: 1    a score of 3 or lower indicates low risk for opioid abuse		  a score of 4-7 indicates moderate risk for opioid abuse		  a score of 8 or higher indicates high risk for opioid abuse  	  REVIEW OF SYSTEMS:  CONSTITUTIONAL: No fever or fatigue  HEENT:  No difficulty hearing, no change in vision  NECK: + chronic neck pain, no neck stiffness  RESPIRATORY: No cough, wheezing, chills or hemoptysis; No shortness of breath  CARDIOVASCULAR: No chest pain, palpitations, dizziness, or leg swelling  GASTROINTESTINAL: No loss of appetite, decreased PO intake. No abdominal or epigastric pain. No nausea, vomiting; No diarrhea, constipation  GENITOURINARY: No dysuria, frequency, hematuria, retention or incontinence  MUSCULOSKELETAL: + left hip pain that radiates to left buttock. + "tight" sensation in left buttock. + c/o chronic lumbar back pain with history of LESI, + C spine surgery in 2013, + left hip pain with movement, no right upper or right lower motor strength weakness, no saddle anesthesia, bowel/bladder incontinence, + fall, + left upper extremity numbness with + paresthesias  NEURO: No headaches, + intermittent LUE numbness with tingling, + left ankle burning pain chronic pain, + weakness in LLE  PSYCHIATRIC: + history of anxiety/depression    PHYSICAL EXAM:  GENERAL:  Alert & Oriented X 4, cooperative, NAD, Good concentration. Speech is clear.   RESPIRATORY: Respirations even and unlabored. Clear to auscultation bilaterally  CARDIOVASCULAR: Normal S1/S2, regular rate and rhythm;   GASTROINTESTINAL:  Soft, Nontender, Nondistended; Bowel sounds present  PERIPHERAL VASCULAR:  Extremities warm without edema. 2+ Peripheral Pulses, No cyanosis, No calf tenderness  MUSCULOSKELETAL: Motor Strength 5/5 B/L upper and RLE, 3/5 left lower extremity; ROM intact in BUE and right LE, + SLR at 40 degrees in LLE; + tenderness on palpation of left hip, left pubic   SKIN: Warm, dry, intact.  + ecchymosis in left hip    Risk factors associated with adverse outcomes related to opioid treatment  [X]  Concurrent benzodiazepine use  [ ]  History/ Active substance use or alcohol use disorder  [X] Psychiatric co-morbidity  [ ] Sleep apnea  [ ] COPD  [ ] BMI> 35  [ ] Liver dysfunction  [ ] Renal dysfunction  [ ] CHF  [ ] Smoker  [X]  Age > 60 years    [X]  [place]  Reviewed and Copied to Chart. See below.    Plan of care and goal oriented pain management treatment options were discussed with patient and /or primary care giver; all questions and concerns were addressed and care was aligned with patient's wishes.    Educated patient on goal oriented pain management treatment options      Source of information: ROULA PASTOR, Chart review  Patient language: English  : n/a    HPI:  68 yrs old F, from home, ambulating independently, pmhx HTN, anxiety, pshx appendectomy, presented with back and hip pain s/p mechanical fall. Pt stated that she is unsure of how she has fallen down, did not trip but likely when turning lost balance and hit the wall or the floor. Pt denied chest pain, palpitation, dyspnea, dizziness, visual changes, abdominal pain, N/V/D, dysuria, fever, chills, unilateral weakness or decreased sensation however has been generally weak lately s/p appendectomy and was on the physical therapy at home for regaining her strength. Pt denied saddle anesthesia, urinary or bladder incontinence however endorsed inability to urinate despite the urge.   Pt denied alcohol consumption, smoking or use of illicit drugs. She endorsed getting Morphine after the surgery which was prescribed however no longer taking.  (18 Dec 2023 08:53)    Patient is a 68y old  Female who presents with a chief complaint of pubic and sacral fracture (19 Dec 2023 11:47)    CT pelvis 12/18 demonstrated acute comminuted and mildly displaced fracture of the left superior pubic ramus extending into the left pubic symphysis and left inferior pubic ramus.  Acute comminuted and nondisplaced left sacral ala fracture.    Pt is a 68 year old female admitted for public and sacral fracture after a fall sustained at home. Pt denies LOC with fall.  Pain consulted on 12/19 for management of left hip/buttock pain and chronic back pain. Pt reports that she recently spent 6 weeks at [facility] for treatment of a ruptured appendix with surgical/medical management. States that post hospitalization she was participating in PT sessions at home to improve the strength in her LEs.  States that she was improving well however she "tripped over a cord while getting out of bed" last Sunday.  Pt reports chronic cervical and lumbar spine pain and was previously being treated with LESI and oral analgesics for chronic pain.  Pt states that she was under the care of a physician at [facility] pain management in [place] however she stopped going to the doctor as she was not pleased with the services.   Pt seen and examined at bedside this morning. Patient on commode reporting that she feels better since yesterday. Reports pain score 6/10 SCALE USED: (1-10 VNRS). Pt describes pain as aching, deep radiating to left buttock. Pain is alleviated by oxycodone 7.5mg and is exacerbated by movement. Pt also reports chronic left ankle pain s/p left ankle surgery in 2019. Pt states takes Percocet 5/325 mg every 4-6 hours at home with good pain relief and is opioid dependent. Verified on iStop. Pt tolerating PO diet. Denies lethargy, chest pain, SOB, nausea, vomiting, constipation. Last BM 12/22. Patient stated goal for pain control: to be able to take deep breaths, get out of bed to chair and ambulate with tolerable pain control. Pt ambulates with rolling waker at home. Patient to be discharged to [facility].    PAST MEDICAL & SURGICAL HISTORY:  Cervical disc disorder at C6-C7 level with radiculopathy  with ulnar nerve compression    Anxiety    Carpal tunnel syndrome of left wrist    Breast mass, left    Uterine fibroid    Cataract  bilateral eyes    HPV in female    Fibrocystic breast disease    Subclavian arterial stenosis  left side 2018    Foot fracture, left  2016, s/p pins/screws    Osteoporosis    Chronic pain    History of spinal fusion  cervical C5-C7 with metal hardware-2012 ( discectomy)    S/P carpal tunnel release  left-2015    S/P decompression of ulnar nerve at elbow  left -2015 ( done with carpal tunnel release)    S/P lumpectomy, left breast  2010    S/P hysterectomy  2008    H/O bilateral breast reduction surgery  2002    S/P cataract surgery, right  with artificial lenses- November 2016    S/P tonsillectomy    S/P cataract surgery, left    S/P ORIF (open reduction internal fixation) fracture  left foot    FAMILY HISTORY:  Family history of non-Hodgkin's lymphoma (Mother)    Acute myocardial infarction (Father)    Social History:   [X] Denies ETOH use, illicit drug use and smoking - quit smoking recently    Allergies  Avelox (Other)  NSAIDs (Other; Stomach Upset)  penicillin (Other)    MEDICATIONS  (STANDING):  acetaminophen     Tablet .. 1000 milliGRAM(s) Oral every 8 hours  clonazePAM  Tablet 1 milliGRAM(s) Oral at bedtime  enoxaparin Injectable 40 milliGRAM(s) SubCutaneous every 24 hours  influenza  Vaccine (HIGH DOSE) 0.7 milliLiter(s) IntraMuscular once  lidocaine   4% Patch 1 Patch Transdermal daily    MEDICATIONS  (PRN):  bisacodyl 5 milliGRAM(s) Oral every 12 hours PRN Constipation  clonazePAM  Tablet 0.5 milliGRAM(s) Oral daily PRN anxiety  cyclobenzaprine 5 milliGRAM(s) Oral three times a day PRN Muscle Spasm  oxyCODONE    IR 7.5 milliGRAM(s) Oral every 4 hours PRN Severe Pain (7 - 10)  polyethylene glycol 3350 17 Gram(s) Oral daily PRN Constipation  senna 2 Tablet(s) Oral at bedtime PRN Constipation  simethicone 80 milliGRAM(s) Chew two times a day PRN Gas    Vital Signs Last 24 Hrs  T(C): 36.4 (22 Dec 2023 05:05), Max: 36.8 (21 Dec 2023 20:35)  T(F): 97.6 (22 Dec 2023 05:05), Max: 98.3 (21 Dec 2023 20:35)  HR: 81 (22 Dec 2023 05:05) (81 - 102)  BP: 151/71 (22 Dec 2023 05:05) (90/61 - 151/71)  BP(mean): 99 (22 Dec 2023 05:05) (71 - 99)  RR: 17 (22 Dec 2023 05:05) (17 - 18)  SpO2: 99% (22 Dec 2023 05:05) (99% - 99%)    Parameters below as of 22 Dec 2023 05:05  Patient On (Oxygen Delivery Method): room air    COVID-19 PCR: NotDetec (21 Dec 2023 16:18)    LABS: Reviewed                          10.0   8.67  )-----------( 164      ( 21 Dec 2023 07:08 )             30.7     12-21    137  |  105  |  12  ----------------------------<  99  4.1   |  26  |  0.56    Ca    8.4      21 Dec 2023 07:08    Urinalysis Basic - ( 21 Dec 2023 07:08 )    Color: x / Appearance: x / SG: x / pH: x  Gluc: 99 mg/dL / Ketone: x  / Bili: x / Urobili: x   Blood: x / Protein: x / Nitrite: x   Leuk Esterase: x / RBC: x / WBC x   Sq Epi: x / Non Sq Epi: x / Bacteria: x    CAPILLARY BLOOD GLUCOSE    COVID-19 PCR: NotDetec (21 Dec 2023 16:18)    Radiology: Reviewed.  < from: CT Pelvis Bony Only No Cont (12.18.23 @ 06:05) >    ACC: [id number] EXAM:  CT PELVIS BONY ONLY   ORDERED BY: ARISTEO MALLORY     PROCEDURE DATE:  12/18/2023      INTERPRETATION:  CLINICAL INFORMATION: Left hip pain.    TECHNIQUE: CT of the pelvis was performed in bone and soft tissue windows   withcoronal and sagittal reformats. No intravenous contrast was   administered. 3-D reformats were performed on a separate workstation.    COMPARISON: CT of the abdomen and pelvis from 10/7/2021.    FINDINGS:    Bone: An acute comminuted and mildly displaced fracture of the left   superior pubic ramus is seen extending into the left pubic symphysis and   left inferior pubic ramus. An acute comminuted and nondisplaced left   sacral ala fracture is noted. An old right superior and inferior pubic   ramus fractures are seen. No additional fracture or dislocation is   demonstrated.    Soft tissues: Mild subcutaneous inflammatory change is seen in the   lateral left hip. Enlargement of the left adductor musculature is noted   likely due to muscle strain and/or trauma.    Miscellaneous: Colonic diverticulosis is noted without diverticulitis.   The patient is status post a supracervical hysterectomy. No adnexal   masses are present. Urinary bladder is distended. Mild inflammatory   change is seen in the inferior left pelvis superior to the left superior   pubic ramus fracture.    IMPRESSION:    1. Acute comminuted and mildly displaced fracture of the left superior   pubic ramus extending into the left pubic symphysis and left inferior   pubic ramus.  2. Acute comminuted and nondisplaced left sacral ala fracture.    --- End of Report ---    GRAZYNA BAÑUELOS MD; Attending Radiologist  This document has been electronically signed. Dec 18 2023  6:17AM    < end of copied text >    ORT Score -   Family Hx of substance abuse	Female	      Male  Alcohol 	                                           1                     3  Illegal drugs	                                   2                     3  Rx drugs                                           4 	                  4  Personal Hx of substance abuse		  Alcohol 	                                          3	                  3  Illegal drugs                                     4	                  4  Rx drugs                                            5 	                  5  Age between 16- 45 years	           1                     1  hx preadolescent sexual abuse	   3 	                  0  Psychological disease		  ADD, OCD, bipolar, schizophrenia   2	          2  Depression                                           1 	          1  Total: 1    a score of 3 or lower indicates low risk for opioid abuse		  a score of 4-7 indicates moderate risk for opioid abuse		  a score of 8 or higher indicates high risk for opioid abuse  	  REVIEW OF SYSTEMS:  CONSTITUTIONAL: No fever or fatigue  HEENT:  No difficulty hearing, no change in vision  NECK: + chronic neck pain, no neck stiffness  RESPIRATORY: No cough, wheezing, chills or hemoptysis; No shortness of breath  CARDIOVASCULAR: No chest pain, palpitations, dizziness, or leg swelling  GASTROINTESTINAL: No loss of appetite, decreased PO intake. No abdominal or epigastric pain. No nausea, vomiting; No diarrhea, constipation  GENITOURINARY: No dysuria, frequency, hematuria, retention or incontinence  MUSCULOSKELETAL: + left hip pain that radiates to left buttock. + "tight" sensation in left buttock. + c/o chronic lumbar back pain with history of LESI, + C spine surgery in 2013, + left hip pain with movement, no right upper or right lower motor strength weakness, no saddle anesthesia, bowel/bladder incontinence, + fall, + left upper extremity numbness with + paresthesias  NEURO: No headaches, + intermittent LUE numbness with tingling, + left ankle burning pain chronic pain, + weakness in LLE  PSYCHIATRIC: + history of anxiety/depression    PHYSICAL EXAM:  GENERAL:  Alert & Oriented X 4, cooperative, NAD, Good concentration. Speech is clear.   RESPIRATORY: Respirations even and unlabored. Clear to auscultation bilaterally  CARDIOVASCULAR: Normal S1/S2, regular rate and rhythm;   GASTROINTESTINAL:  Soft, Nontender, Nondistended; Bowel sounds present  PERIPHERAL VASCULAR:  Extremities warm without edema. 2+ Peripheral Pulses, No cyanosis, No calf tenderness  MUSCULOSKELETAL: Motor Strength 5/5 B/L upper and RLE, 3/5 left lower extremity; ROM intact in BUE and right LE, + SLR at 40 degrees in LLE; + tenderness on palpation of left hip, left pubic   SKIN: Warm, dry, intact.  + ecchymosis in left hip    Risk factors associated with adverse outcomes related to opioid treatment  [X]  Concurrent benzodiazepine use  [ ]  History/ Active substance use or alcohol use disorder  [X] Psychiatric co-morbidity  [ ] Sleep apnea  [ ] COPD  [ ] BMI> 35  [ ] Liver dysfunction  [ ] Renal dysfunction  [ ] CHF  [ ] Smoker  [X]  Age > 60 years    [X]  [place]  Reviewed and Copied to Chart. See below.    Plan of care and goal oriented pain management treatment options were discussed with patient and /or primary care giver; all questions and concerns were addressed and care was aligned with patient's wishes.    Educated patient on goal oriented pain management treatment options

## 2023-12-22 NOTE — PROGRESS NOTE ADULT - ASSESSMENT
Confidential Drug Utilization Report  Search Terms: ROULA PASTOR, 1955 Search Date: 12/19/2023 13:53:51 PM  The Drug Utilization Report below displays all of the controlled substance prescriptions, if any, that your patient has filled in the last twelve months. The information displayed on this report is compiled from pharmacy submissions to the Department, and accurately reflects the information as submitted by the pharmacies.    This report was requested by: Bonita Christiansen | Reference #: [id number]    You have not added a KWABENA number. Keeping your KWABENA number(s) up to date on the My KWABENA # tab will enable the separation of your prescriptions from others in the search results.    Practitioner Count: 2  Pharmacy Count: 1  Current Opioid Prescriptions: 1  Current Benzodiazepine Prescriptions: 1  Current Stimulant Prescriptions: 0      Patient Demographic Information (PDI)       PDI	First Name	Last Name	Birth Date	Gender	Street Address	City	State	Zip Code  MACAIRO Lopez	1955	Female	[phone]	[facility]	[address]    Prescription Information      PDI Filter:    PDI	Current Rx	Drug Type	Rx Written	Rx Dispensed	Drug	Quantity	Days Supply	Prescriber Name	Prescriber KWABENA #	Payment Method	Dispenser  A	Y	B	12/06/2023	12/08/2023	clonazepam 1 mg tablet	60	30	Gina Dale	[id number]	[facility]  A	Y	O	12/07/2023	12/08/2023	oxycodone-acetaminophen 5-325 mg tablet	90	30	FenMilagros maddox MD	[id number]	[facility]  A	N	O	11/09/2023	11/09/2023	oxycodone-acetaminophen 5-325 mg tab	90	30	Milagros Ryder MD	[id number]	[facility]  A	N	B	11/06/2023	11/06/2023	clonazepam 1 mg tablet	60	30	Gina Dale	[id number]	[facility]  A	N	O	10/12/2023	10/13/2023	oxycodone-acetaminophen 5-325 mg tab	90	30	Milagros Ryder MD	[id number]	[facility]  A	N	O	09/14/2023	09/14/2023	oxycodone-acetaminophen 5-325 mg tab	120	30	Milagros Ryder MD	[id number]	[facility]  A	N	B	09/10/2023	09/12/2023	clonazepam 1 mg tablet	60	30	Dale, Gina	[id number]	[facility]  A	N	O	08/10/2023	08/10/2023	oxycodone hcl (ir) 5 mg tablet	135	30	Milagros Ryder MD	[id number]	[facility]  A	N	B	07/26/2023	07/28/2023	clonazepam 1 mg tablet	60	30	Dale, Gina	[id number]	[facility]  A	N	O	07/13/2023	07/13/2023	oxycodone hcl (ir) 10 mg tab	90	30	Milagros Ryder MD	[id number]	[facility]  A	N	O	06/27/2023	06/27/2023	oxycodone-acetaminophen  mg tab	77	19	George Gutierrez MD	[id number]	[facility]  A	N	O	06/13/2023	06/13/2023	oxycodone-acetaminophen  mg tab	55	13	George Gutierrez MD	[id number]	[facility]  A	N	B	05/24/2023	05/30/2023	clonazepam 1 mg tablet	60	30	Dale, Gina	[id number]	[facility]  A	N	O	05/23/2023	05/23/2023	oxycodone-acetaminophen  mg tablet	77	19	George Gutierrez MD	[id number]	[facility]  A	N	B	04/26/2023	04/27/2023	clonazepam 1 mg tablet	60	30	Dale, Gina	[id number]	[facility]  A	N	O	04/25/2023	04/25/2023	oxycodone-acetaminophen  mg tab	110	28	George Gutierrez MD	[id number]	[facility]  A	N	O	04/11/2023	04/11/2023	oxycodone-acetaminophen  mg tab	55	14	George Gutierrez MD	[id number]	[facility]  A	N	O	03/28/2023	03/28/2023	oxycodone-acetaminophen  mg tab	55	14	George Gutierrez MD	[id number]	[facility]  A	N	B	03/22/2023	03/22/2023	clonazepam 1 mg tablet	60	30	Dale, Gina	[id number]	[facility]  A	N	O	03/14/2023	03/14/2023	oxycodone-acetaminophen  mg tab	55	14	George Gutierrez MD	[id number]	[facility]  A	N	O	02/28/2023	02/28/2023	oxycodone-acetaminophen  mg tab	55	14	BrendaGeorge iraheta MD	[id number]	[facility]  A	N	O	02/14/2023	02/14/2023	oxycodone-acetaminophen  mg tab	55	14	BrendaGeorge iraheta MD	[id number]	[facility]  A	N	B	02/07/2023	02/07/2023	clonazepam 1 mg tablet	60	30	Dale, Gina	[id number]	[facility]  A	N	O	01/31/2023	01/31/2023	oxycodone-acetaminophen  mg tab	55	14	BrendaGeorge kendall MD	[id number]	[facility]  A	N	O	01/17/2023	01/17/2023	oxycodone-acetaminophen  mg tab	55	14	BrendaGeorge iraheta MD	[id number]	[facility]  A	N	O	01/03/2023	01/03/2023	oxycodone-acetaminophen  mg tab	55	14	BrendaGeorge iraheta MD	[id number]	[facility]  A	N	B	12/28/2022	12/30/2022	clonazepam 1 mg tablet	60	30	Dale, Gina	[id number]	[facility]  A	N	O	12/20/2022	12/20/2022	oxycodone-acetaminophen  mg tab	55	14	George Gutierrez MD	[id number]	[facility]    * - Details of Drug Type : O = Opioid, B = Benzodiazepine, S = Stimulant    * - Drugs marked with an asterisk are compound drugs. If the compound drug is made up of more than one controlled substance, then each controlled substance will be a separate row in the table.     Confidential Drug Utilization Report  Search Terms: ROULA PASTOR, 1955 Search Date: 12/19/2023 13:53:51 PM  The Drug Utilization Report below displays all of the controlled substance prescriptions, if any, that your patient has filled in the last twelve months. The information displayed on this report is compiled from pharmacy submissions to the Department, and accurately reflects the information as submitted by the pharmacies.    This report was requested by: Bonita Christiansen | Reference #: [id number]    You have not added a KWABENA number. Keeping your KWABENA number(s) up to date on the My KWABENA # tab will enable the separation of your prescriptions from others in the search results.    Practitioner Count: 2  Pharmacy Count: 1  Current Opioid Prescriptions: 1  Current Benzodiazepine Prescriptions: 1  Current Stimulant Prescriptions: 0      Patient Demographic Information (PDI)       PDI	First Name	Last Name	Birth Date	Gender	Street Address	City	State	Zip Code  MACARIO Lopez	1955	Female	[phone]	[facility]	[address]    Prescription Information      PDI Filter:    PDI	Current Rx	Drug Type	Rx Written	Rx Dispensed	Drug	Quantity	Days Supply	Prescriber Name	Prescriber KWABENA #	Payment Method	Dispenser  A	Y	B	12/06/2023	12/08/2023	clonazepam 1 mg tablet	60	30	Gina Dale	[id number]	[facility]  A	Y	O	12/07/2023	12/08/2023	oxycodone-acetaminophen 5-325 mg tablet	90	30	FenMilagros maddox MD	[id number]	[facility]  A	N	O	11/09/2023	11/09/2023	oxycodone-acetaminophen 5-325 mg tab	90	30	Milagros Ryder MD	[id number]	[facility]  A	N	B	11/06/2023	11/06/2023	clonazepam 1 mg tablet	60	30	Gina Dale	[id number]	[facility]  A	N	O	10/12/2023	10/13/2023	oxycodone-acetaminophen 5-325 mg tab	90	30	Milagros Ryder MD	[id number]	[facility]  A	N	O	09/14/2023	09/14/2023	oxycodone-acetaminophen 5-325 mg tab	120	30	Milagros Ryder MD	[id number]	[facility]  A	N	B	09/10/2023	09/12/2023	clonazepam 1 mg tablet	60	30	Dale, Gina	[id number]	[facility]  A	N	O	08/10/2023	08/10/2023	oxycodone hcl (ir) 5 mg tablet	135	30	Milagrso Ryder MD	[id number]	[facility]  A	N	B	07/26/2023	07/28/2023	clonazepam 1 mg tablet	60	30	Dale, Gina	[id number]	[facility]  A	N	O	07/13/2023	07/13/2023	oxycodone hcl (ir) 10 mg tab	90	30	Milagros Ryder MD	[id number]	[facility]  A	N	O	06/27/2023	06/27/2023	oxycodone-acetaminophen  mg tab	77	19	George Gutierrez MD	[id number]	[facility]  A	N	O	06/13/2023	06/13/2023	oxycodone-acetaminophen  mg tab	55	13	George Gutierrez MD	[id number]	[facility]  A	N	B	05/24/2023	05/30/2023	clonazepam 1 mg tablet	60	30	Dale, Gina	[id number]	[facility]  A	N	O	05/23/2023	05/23/2023	oxycodone-acetaminophen  mg tablet	77	19	George Gutierrez MD	[id number]	[facility]  A	N	B	04/26/2023	04/27/2023	clonazepam 1 mg tablet	60	30	Dale, Gina	[id number]	[facility]  A	N	O	04/25/2023	04/25/2023	oxycodone-acetaminophen  mg tab	110	28	George Gutierrez MD	[id number]	[facility]  A	N	O	04/11/2023	04/11/2023	oxycodone-acetaminophen  mg tab	55	14	George Gutierrez MD	[id number]	[facility]  A	N	O	03/28/2023	03/28/2023	oxycodone-acetaminophen  mg tab	55	14	George Gutierrez MD	[id number]	[facility]  A	N	B	03/22/2023	03/22/2023	clonazepam 1 mg tablet	60	30	Dale, Gina	[id number]	[facility]  A	N	O	03/14/2023	03/14/2023	oxycodone-acetaminophen  mg tab	55	14	George Gutierrez MD	[id number]	[facility]  A	N	O	02/28/2023	02/28/2023	oxycodone-acetaminophen  mg tab	55	14	BrendaGeorge iraheta MD	[id number]	[facility]  A	N	O	02/14/2023	02/14/2023	oxycodone-acetaminophen  mg tab	55	14	BrendaGeorge iraheta MD	[id number]	[facility]  A	N	B	02/07/2023	02/07/2023	clonazepam 1 mg tablet	60	30	Dale, Gina	[id number]	[facility]  A	N	O	01/31/2023	01/31/2023	oxycodone-acetaminophen  mg tab	55	14	BrendaGeorge kendall MD	[id number]	[facility]  A	N	O	01/17/2023	01/17/2023	oxycodone-acetaminophen  mg tab	55	14	BrendaGeorge iraheta MD	[id number]	[facility]  A	N	O	01/03/2023	01/03/2023	oxycodone-acetaminophen  mg tab	55	14	BrendaGeorge iraheta MD	[id number]	[facility]  A	N	B	12/28/2022	12/30/2022	clonazepam 1 mg tablet	60	30	Dale, Gina	[id number]	[facility]  A	N	O	12/20/2022	12/20/2022	oxycodone-acetaminophen  mg tab	55	14	George Gutierrez MD	[id number]	[facility]    * - Details of Drug Type : O = Opioid, B = Benzodiazepine, S = Stimulant    * - Drugs marked with an asterisk are compound drugs. If the compound drug is made up of more than one controlled substance, then each controlled substance will be a separate row in the table.

## 2023-12-22 NOTE — DISCHARGE NOTE NURSING/CASE MANAGEMENT/SOCIAL WORK - PATIENT PORTAL LINK FT
You can access the FollowMyHealth Patient Portal offered by [facility] by registering at the following website: [email or web address]. By joining [employer]’s FollowMyHealth portal, you will also be able to view your health information using other applications (apps) compatible with our system. You can access the FollowMyHealth Patient Portal offered by [facility] by registering at the following website: [email or web address]. By joining [employer]’s FollowMyHealth portal, you will also be able to view your health information using other applications (apps) compatible with our system.

## 2023-12-22 NOTE — PROGRESS NOTE ADULT - PROBLEM SELECTOR PROBLEM 3
HTN (hypertension)
Pubic ramus fracture
HTN (hypertension)
HTN (hypertension)
Pubic ramus fracture
Pubic ramus fracture

## 2023-12-22 NOTE — DISCHARGE NOTE NURSING/CASE MANAGEMENT/SOCIAL WORK - NSDCPEFALRISK_GEN_ALL_CORE
For information on Fall & Injury Prevention, visit: [email or web address] OR  [email or web address] OR  https://www.cdc.gov/steadi/patient.html For information on Fall & Injury Prevention, visit: [email or web address] OR  [email or web address] OR  https://www.cdc.gov/steadi/patient.html

## 2023-12-22 NOTE — PROGRESS NOTE ADULT - PROBLEM SELECTOR PROBLEM 5
Osteoporosis
Prophylactic measure

## 2024-01-29 ENCOUNTER — APPOINTMENT (OUTPATIENT)
Dept: MRI IMAGING | Facility: CLINIC | Age: 69
End: 2024-01-29
Payer: MEDICARE

## 2024-01-29 PROCEDURE — A9585: CPT

## 2024-01-29 PROCEDURE — 72149 MRI LUMBAR SPINE W/DYE: CPT

## 2024-05-15 NOTE — ED PROVIDER NOTE - NS ED ATTENDING STATEMENT MOD
"Violeta Logan" Suzette was seen and treated in our emergency department on 5/15/2024.  She may return to work on 05/16/2024.       If you have any questions or concerns, please don't hesitate to call.       RN    " Attending Only

## 2024-12-10 ENCOUNTER — APPOINTMENT (OUTPATIENT)
Dept: ORTHOPEDIC SURGERY | Facility: CLINIC | Age: 69
End: 2024-12-10
Payer: MEDICARE

## 2024-12-10 VITALS
HEIGHT: 63 IN | HEART RATE: 76 BPM | BODY MASS INDEX: 20.02 KG/M2 | DIASTOLIC BLOOD PRESSURE: 78 MMHG | OXYGEN SATURATION: 96 % | WEIGHT: 113 LBS | SYSTOLIC BLOOD PRESSURE: 119 MMHG

## 2024-12-10 DIAGNOSIS — M70.21 OLECRANON BURSITIS, RIGHT ELBOW: ICD-10-CM

## 2024-12-10 PROCEDURE — 99203 OFFICE O/P NEW LOW 30 MIN: CPT | Mod: 25

## 2024-12-10 PROCEDURE — 20606 DRAIN/INJ JOINT/BURSA W/US: CPT | Mod: RT

## 2025-04-16 ENCOUNTER — APPOINTMENT (OUTPATIENT)
Dept: ORTHOPEDIC SURGERY | Facility: CLINIC | Age: 70
End: 2025-04-16

## 2025-04-16 VITALS
OXYGEN SATURATION: 96 % | WEIGHT: 113 LBS | BODY MASS INDEX: 20.02 KG/M2 | DIASTOLIC BLOOD PRESSURE: 75 MMHG | HEART RATE: 80 BPM | SYSTOLIC BLOOD PRESSURE: 129 MMHG | HEIGHT: 63 IN

## 2025-04-16 DIAGNOSIS — S92.354A NONDISPLACED FRACTURE OF FIFTH METATARSAL BONE, RIGHT FOOT, INITIAL ENCOUNTER FOR CLOSED FRACTURE: ICD-10-CM

## 2025-04-16 PROCEDURE — 73610 X-RAY EXAM OF ANKLE: CPT | Mod: LT

## 2025-04-16 PROCEDURE — 99203 OFFICE O/P NEW LOW 30 MIN: CPT

## 2025-04-16 PROCEDURE — 73630 X-RAY EXAM OF FOOT: CPT | Mod: RT

## 2025-05-12 ENCOUNTER — NON-APPOINTMENT (OUTPATIENT)
Age: 70
End: 2025-05-12

## 2025-05-14 ENCOUNTER — APPOINTMENT (OUTPATIENT)
Age: 70
End: 2025-05-14
Payer: MEDICARE

## 2025-05-14 VITALS
SYSTOLIC BLOOD PRESSURE: 140 MMHG | OXYGEN SATURATION: 98 % | HEIGHT: 63 IN | WEIGHT: 116 LBS | HEART RATE: 76 BPM | BODY MASS INDEX: 20.55 KG/M2 | DIASTOLIC BLOOD PRESSURE: 84 MMHG

## 2025-05-14 DIAGNOSIS — S92.354D NONDISPLACED FRACTURE OF FIFTH METATARSAL BONE, RIGHT FOOT, SUBSEQUENT ENCOUNTER FOR FRACTURE WITH ROUTINE HEALING: ICD-10-CM

## 2025-05-14 PROCEDURE — 73630 X-RAY EXAM OF FOOT: CPT | Mod: RT

## 2025-05-14 PROCEDURE — 99214 OFFICE O/P EST MOD 30 MIN: CPT

## 2025-06-11 ENCOUNTER — APPOINTMENT (OUTPATIENT)
Age: 70
End: 2025-06-11
Payer: MEDICARE

## 2025-06-11 VITALS
DIASTOLIC BLOOD PRESSURE: 73 MMHG | SYSTOLIC BLOOD PRESSURE: 120 MMHG | WEIGHT: 116 LBS | HEART RATE: 75 BPM | HEIGHT: 63 IN | OXYGEN SATURATION: 96 % | BODY MASS INDEX: 20.55 KG/M2

## 2025-06-11 PROCEDURE — 99214 OFFICE O/P EST MOD 30 MIN: CPT

## 2025-06-20 ENCOUNTER — APPOINTMENT (OUTPATIENT)
Dept: RADIOLOGY | Facility: CLINIC | Age: 70
End: 2025-06-20
Payer: MEDICARE

## 2025-06-20 PROCEDURE — 73630 X-RAY EXAM OF FOOT: CPT | Mod: LT

## 2025-07-09 ENCOUNTER — APPOINTMENT (OUTPATIENT)
Age: 70
End: 2025-07-09
Payer: MEDICARE

## 2025-07-09 VITALS
OXYGEN SATURATION: 97 % | DIASTOLIC BLOOD PRESSURE: 79 MMHG | BODY MASS INDEX: 20.55 KG/M2 | HEART RATE: 87 BPM | WEIGHT: 116 LBS | SYSTOLIC BLOOD PRESSURE: 127 MMHG | HEIGHT: 63 IN

## 2025-07-09 PROCEDURE — 99214 OFFICE O/P EST MOD 30 MIN: CPT

## 2025-07-09 RX ORDER — DICLOFENAC SODIUM 10 MG/G
1 GEL TOPICAL
Qty: 1 | Refills: 1 | Status: ACTIVE | COMMUNITY
Start: 2025-07-09 | End: 1900-01-01

## 2025-07-18 ENCOUNTER — APPOINTMENT (OUTPATIENT)
Dept: ORTHOPEDIC SURGERY | Facility: CLINIC | Age: 70
End: 2025-07-18
Payer: MEDICARE

## 2025-07-18 VITALS — RESPIRATION RATE: 16 BRPM | WEIGHT: 116 LBS | BODY MASS INDEX: 20.55 KG/M2 | HEIGHT: 63 IN

## 2025-07-18 PROCEDURE — 99214 OFFICE O/P EST MOD 30 MIN: CPT

## 2025-07-18 RX ORDER — OXYCODONE HYDROCHLORIDE AND ACETAMINOPHEN 10; 325 MG/1; MG/1
TABLET ORAL
Refills: 0 | Status: ACTIVE | COMMUNITY